# Patient Record
Sex: MALE | Race: BLACK OR AFRICAN AMERICAN | NOT HISPANIC OR LATINO | Employment: UNEMPLOYED | URBAN - METROPOLITAN AREA
[De-identification: names, ages, dates, MRNs, and addresses within clinical notes are randomized per-mention and may not be internally consistent; named-entity substitution may affect disease eponyms.]

---

## 2017-07-27 ENCOUNTER — TELEPHONE (OUTPATIENT)
Dept: SPEECH THERAPY | Facility: HOSPITAL | Age: 22
End: 2017-07-27

## 2019-12-23 ENCOUNTER — HOSPITAL ENCOUNTER (INPATIENT)
Facility: HOSPITAL | Age: 24
LOS: 4 days | Discharge: HOME | DRG: 101 | End: 2019-12-27
Attending: EMERGENCY MEDICINE | Admitting: STUDENT IN AN ORGANIZED HEALTH CARE EDUCATION/TRAINING PROGRAM
Payer: MEDICAID

## 2019-12-23 DIAGNOSIS — G40.901 STATUS EPILEPTICUS: Primary | ICD-10-CM

## 2019-12-23 DIAGNOSIS — Z01.818 ENCOUNTER FOR INTUBATION: ICD-10-CM

## 2019-12-23 DIAGNOSIS — J10.1 INFLUENZA B: ICD-10-CM

## 2019-12-23 DIAGNOSIS — R56.9 SEIZURE: ICD-10-CM

## 2019-12-23 LAB
ALBUMIN SERPL BCP-MCNC: 4 G/DL (ref 3.5–5.2)
ALLENS TEST: ABNORMAL
ALP SERPL-CCNC: 78 U/L (ref 55–135)
ALT SERPL W/O P-5'-P-CCNC: 18 U/L (ref 10–44)
AMPHET+METHAMPHET UR QL: NEGATIVE
AMPHET+METHAMPHET UR QL: NEGATIVE
ANION GAP SERPL CALC-SCNC: 8 MMOL/L (ref 8–16)
AST SERPL-CCNC: 19 U/L (ref 10–40)
BACTERIA #/AREA URNS HPF: NEGATIVE /HPF
BARBITURATES UR QL SCN>200 NG/ML: NEGATIVE
BARBITURATES UR QL SCN>200 NG/ML: NEGATIVE
BASOPHILS # BLD AUTO: 0.02 K/UL (ref 0–0.2)
BASOPHILS # BLD AUTO: 0.04 K/UL (ref 0–0.2)
BASOPHILS NFR BLD: 0.2 % (ref 0–1.9)
BASOPHILS NFR BLD: 0.3 % (ref 0–1.9)
BENZODIAZ UR QL SCN>200 NG/ML: NORMAL
BENZODIAZ UR QL SCN>200 NG/ML: NORMAL
BILIRUB SERPL-MCNC: 0.6 MG/DL (ref 0.1–1)
BILIRUB UR QL STRIP: NEGATIVE
BUN SERPL-MCNC: 10 MG/DL (ref 6–20)
BZE UR QL SCN: NEGATIVE
BZE UR QL SCN: NEGATIVE
CALCIUM SERPL-MCNC: 7.8 MG/DL (ref 8.7–10.5)
CANNABINOIDS UR QL SCN: NEGATIVE
CANNABINOIDS UR QL SCN: NEGATIVE
CHLORIDE SERPL-SCNC: 113 MMOL/L (ref 95–110)
CLARITY UR: CLEAR
CO2 SERPL-SCNC: 20 MMOL/L (ref 23–29)
COLOR UR: YELLOW
CREAT SERPL-MCNC: 1 MG/DL (ref 0.5–1.4)
CREAT UR-MCNC: 176 MG/DL (ref 23–375)
CREAT UR-MCNC: 176 MG/DL (ref 23–375)
DELSYS: ABNORMAL
DIFFERENTIAL METHOD: ABNORMAL
DIFFERENTIAL METHOD: ABNORMAL
EOSINOPHIL # BLD AUTO: 0 K/UL (ref 0–0.5)
EOSINOPHIL # BLD AUTO: 0 K/UL (ref 0–0.5)
EOSINOPHIL NFR BLD: 0.1 % (ref 0–8)
EOSINOPHIL NFR BLD: 0.2 % (ref 0–8)
ERYTHROCYTE [DISTWIDTH] IN BLOOD BY AUTOMATED COUNT: 11.3 % (ref 11.5–14.5)
ERYTHROCYTE [DISTWIDTH] IN BLOOD BY AUTOMATED COUNT: 11.4 % (ref 11.5–14.5)
ERYTHROCYTE [SEDIMENTATION RATE] IN BLOOD BY WESTERGREN METHOD: 18 MM/H
EST. GFR  (AFRICAN AMERICAN): >60 ML/MIN/1.73 M^2
EST. GFR  (NON AFRICAN AMERICAN): >60 ML/MIN/1.73 M^2
FIO2: 40
GLUCOSE SERPL-MCNC: 96 MG/DL (ref 70–110)
GLUCOSE SERPL-MCNC: 99 MG/DL (ref 70–110)
GLUCOSE UR QL STRIP: NEGATIVE
HCO3 UR-SCNC: 17.6 MMOL/L (ref 24–28)
HCT VFR BLD AUTO: 42.6 % (ref 40–54)
HCT VFR BLD AUTO: 47.2 % (ref 40–54)
HGB BLD-MCNC: 14.8 G/DL (ref 14–18)
HGB BLD-MCNC: 15.8 G/DL (ref 14–18)
HGB UR QL STRIP: ABNORMAL
HYALINE CASTS #/AREA URNS LPF: 16 /LPF
IMM GRANULOCYTES # BLD AUTO: 0.02 K/UL (ref 0–0.04)
IMM GRANULOCYTES # BLD AUTO: 0.03 K/UL (ref 0–0.04)
IMM GRANULOCYTES NFR BLD AUTO: 0.1 % (ref 0–0.5)
IMM GRANULOCYTES NFR BLD AUTO: 0.3 % (ref 0–0.5)
INFLUENZA A, MOLECULAR: NEGATIVE
INFLUENZA B, MOLECULAR: POSITIVE
KETONES UR QL STRIP: NEGATIVE
LACTATE SERPL-SCNC: 1.5 MMOL/L (ref 0.5–1.9)
LACTATE SERPL-SCNC: 2.2 MMOL/L (ref 0.5–1.9)
LACTATE SERPL-SCNC: 2.7 MMOL/L (ref 0.5–1.9)
LEUKOCYTE ESTERASE UR QL STRIP: NEGATIVE
LYMPHOCYTES # BLD AUTO: 0.4 K/UL (ref 1–4.8)
LYMPHOCYTES # BLD AUTO: 1.3 K/UL (ref 1–4.8)
LYMPHOCYTES NFR BLD: 3.6 % (ref 18–48)
LYMPHOCYTES NFR BLD: 9.7 % (ref 18–48)
MCH RBC QN AUTO: 32.1 PG (ref 27–31)
MCH RBC QN AUTO: 32.7 PG (ref 27–31)
MCHC RBC AUTO-ENTMCNC: 33.5 G/DL (ref 32–36)
MCHC RBC AUTO-ENTMCNC: 34.7 G/DL (ref 32–36)
MCV RBC AUTO: 94 FL (ref 82–98)
MCV RBC AUTO: 96 FL (ref 82–98)
MICROSCOPIC COMMENT: ABNORMAL
MODE: ABNORMAL
MONOCYTES # BLD AUTO: 0.6 K/UL (ref 0.3–1)
MONOCYTES # BLD AUTO: 1.4 K/UL (ref 0.3–1)
MONOCYTES NFR BLD: 10.3 % (ref 4–15)
MONOCYTES NFR BLD: 6.1 % (ref 4–15)
NEUTROPHILS # BLD AUTO: 10.7 K/UL (ref 1.8–7.7)
NEUTROPHILS # BLD AUTO: 8.8 K/UL (ref 1.8–7.7)
NEUTROPHILS NFR BLD: 79.5 % (ref 38–73)
NEUTROPHILS NFR BLD: 89.6 % (ref 38–73)
NITRITE UR QL STRIP: NEGATIVE
NRBC BLD-RTO: 0 /100 WBC
NRBC BLD-RTO: 0 /100 WBC
OPIATES UR QL SCN: NEGATIVE
OPIATES UR QL SCN: NEGATIVE
PCO2 BLDA: 30.2 MMHG (ref 35–45)
PCP UR QL SCN>25 NG/ML: NEGATIVE
PCP UR QL SCN>25 NG/ML: NEGATIVE
PEEP: 5
PH SMN: 7.37 [PH] (ref 7.35–7.45)
PH UR STRIP: 6 [PH] (ref 5–8)
PLATELET # BLD AUTO: 101 K/UL (ref 150–350)
PLATELET # BLD AUTO: 105 K/UL (ref 150–350)
PMV BLD AUTO: 10.8 FL (ref 9.2–12.9)
PMV BLD AUTO: 10.9 FL (ref 9.2–12.9)
PO2 BLDA: 218 MMHG (ref 80–100)
POC BE: -8 MMOL/L
POC SATURATED O2: 100 % (ref 95–100)
POC TCO2: 19 MMOL/L (ref 23–27)
POTASSIUM SERPL-SCNC: 3.8 MMOL/L (ref 3.5–5.1)
PROCALCITONIN SERPL IA-MCNC: 0.06 NG/ML (ref 0–0.5)
PROT SERPL-MCNC: 6.2 G/DL (ref 6–8.4)
PROT UR QL STRIP: ABNORMAL
RBC # BLD AUTO: 4.53 M/UL (ref 4.6–6.2)
RBC # BLD AUTO: 4.92 M/UL (ref 4.6–6.2)
RBC #/AREA URNS HPF: 3 /HPF (ref 0–4)
SAMPLE: ABNORMAL
SITE: ABNORMAL
SODIUM SERPL-SCNC: 141 MMOL/L (ref 136–145)
SP GR UR STRIP: 1.02 (ref 1–1.03)
SP02: 100
SPECIMEN SOURCE: ABNORMAL
SQUAMOUS #/AREA URNS HPF: 3 /HPF
TOXICOLOGY INFORMATION: NORMAL
TOXICOLOGY INFORMATION: NORMAL
TSH SERPL DL<=0.005 MIU/L-ACNC: 0.55 UIU/ML (ref 0.34–5.6)
URN SPEC COLLECT METH UR: ABNORMAL
UROBILINOGEN UR STRIP-ACNC: NEGATIVE EU/DL
VT: 400
WBC # BLD AUTO: 13.44 K/UL (ref 3.9–12.7)
WBC # BLD AUTO: 9.83 K/UL (ref 3.9–12.7)
WBC #/AREA URNS HPF: 1 /HPF (ref 0–5)

## 2019-12-23 PROCEDURE — 84443 ASSAY THYROID STIM HORMONE: CPT

## 2019-12-23 PROCEDURE — 31500 INSERT EMERGENCY AIRWAY: CPT

## 2019-12-23 PROCEDURE — 85025 COMPLETE CBC W/AUTO DIFF WBC: CPT | Mod: 91

## 2019-12-23 PROCEDURE — 80307 DRUG TEST PRSMV CHEM ANLYZR: CPT

## 2019-12-23 PROCEDURE — 63600175 PHARM REV CODE 636 W HCPCS: Performed by: STUDENT IN AN ORGANIZED HEALTH CARE EDUCATION/TRAINING PROGRAM

## 2019-12-23 PROCEDURE — 87040 BLOOD CULTURE FOR BACTERIA: CPT | Mod: 59

## 2019-12-23 PROCEDURE — 99900026 HC AIRWAY MAINTENANCE (STAT)

## 2019-12-23 PROCEDURE — 87070 CULTURE OTHR SPECIMN AEROBIC: CPT

## 2019-12-23 PROCEDURE — 96376 TX/PRO/DX INJ SAME DRUG ADON: CPT

## 2019-12-23 PROCEDURE — 99291 CRITICAL CARE FIRST HOUR: CPT

## 2019-12-23 PROCEDURE — 99900035 HC TECH TIME PER 15 MIN (STAT)

## 2019-12-23 PROCEDURE — 36600 WITHDRAWAL OF ARTERIAL BLOOD: CPT

## 2019-12-23 PROCEDURE — 81001 URINALYSIS AUTO W/SCOPE: CPT

## 2019-12-23 PROCEDURE — 20000000 HC ICU ROOM

## 2019-12-23 PROCEDURE — 83605 ASSAY OF LACTIC ACID: CPT

## 2019-12-23 PROCEDURE — 63600175 PHARM REV CODE 636 W HCPCS: Performed by: EMERGENCY MEDICINE

## 2019-12-23 PROCEDURE — 87205 SMEAR GRAM STAIN: CPT

## 2019-12-23 PROCEDURE — 80053 COMPREHEN METABOLIC PANEL: CPT

## 2019-12-23 PROCEDURE — 25000003 PHARM REV CODE 250: Performed by: STUDENT IN AN ORGANIZED HEALTH CARE EDUCATION/TRAINING PROGRAM

## 2019-12-23 PROCEDURE — 95819 EEG AWAKE AND ASLEEP: CPT

## 2019-12-23 PROCEDURE — 96366 THER/PROPH/DIAG IV INF ADDON: CPT

## 2019-12-23 PROCEDURE — 94761 N-INVAS EAR/PLS OXIMETRY MLT: CPT

## 2019-12-23 PROCEDURE — 83605 ASSAY OF LACTIC ACID: CPT | Mod: 91

## 2019-12-23 PROCEDURE — 27000221 HC OXYGEN, UP TO 24 HOURS

## 2019-12-23 PROCEDURE — 96375 TX/PRO/DX INJ NEW DRUG ADDON: CPT

## 2019-12-23 PROCEDURE — 82962 GLUCOSE BLOOD TEST: CPT

## 2019-12-23 PROCEDURE — 51702 INSERT TEMP BLADDER CATH: CPT

## 2019-12-23 PROCEDURE — 96365 THER/PROPH/DIAG IV INF INIT: CPT | Mod: 59

## 2019-12-23 PROCEDURE — 12000002 HC ACUTE/MED SURGE SEMI-PRIVATE ROOM

## 2019-12-23 PROCEDURE — 82803 BLOOD GASES ANY COMBINATION: CPT

## 2019-12-23 PROCEDURE — 87086 URINE CULTURE/COLONY COUNT: CPT

## 2019-12-23 PROCEDURE — 94002 VENT MGMT INPAT INIT DAY: CPT

## 2019-12-23 PROCEDURE — 36415 COLL VENOUS BLD VENIPUNCTURE: CPT

## 2019-12-23 PROCEDURE — 84145 PROCALCITONIN (PCT): CPT

## 2019-12-23 PROCEDURE — 87502 INFLUENZA DNA AMP PROBE: CPT

## 2019-12-23 PROCEDURE — 93005 ELECTROCARDIOGRAM TRACING: CPT

## 2019-12-23 PROCEDURE — 25000003 PHARM REV CODE 250: Performed by: EMERGENCY MEDICINE

## 2019-12-23 RX ORDER — TOPIRAMATE 100 MG/1
200 CAPSULE, EXTENDED RELEASE ORAL DAILY
Status: DISCONTINUED | OUTPATIENT
Start: 2019-12-24 | End: 2019-12-26

## 2019-12-23 RX ORDER — ACETAMINOPHEN 650 MG/1
650 SUPPOSITORY RECTAL
Status: COMPLETED | OUTPATIENT
Start: 2019-12-23 | End: 2019-12-23

## 2019-12-23 RX ORDER — LORAZEPAM 2 MG/ML
2 INJECTION INTRAMUSCULAR
Status: COMPLETED | OUTPATIENT
Start: 2019-12-23 | End: 2019-12-23

## 2019-12-23 RX ORDER — ACETAMINOPHEN 325 MG/1
650 TABLET ORAL EVERY 6 HOURS PRN
Status: DISCONTINUED | OUTPATIENT
Start: 2019-12-23 | End: 2019-12-26

## 2019-12-23 RX ORDER — SODIUM CHLORIDE, SODIUM LACTATE, POTASSIUM CHLORIDE, CALCIUM CHLORIDE 600; 310; 30; 20 MG/100ML; MG/100ML; MG/100ML; MG/100ML
INJECTION, SOLUTION INTRAVENOUS CONTINUOUS
Status: DISCONTINUED | OUTPATIENT
Start: 2019-12-23 | End: 2019-12-24

## 2019-12-23 RX ORDER — CLORAZEPATE DIPOTASSIUM 7.5 MG/1
7.5 TABLET ORAL 2 TIMES DAILY
Status: DISCONTINUED | OUTPATIENT
Start: 2019-12-23 | End: 2019-12-26

## 2019-12-23 RX ORDER — VANCOMYCIN HCL IN 5 % DEXTROSE 1G/250ML
1000 PLASTIC BAG, INJECTION (ML) INTRAVENOUS ONCE
Status: DISCONTINUED | OUTPATIENT
Start: 2019-12-23 | End: 2019-12-23

## 2019-12-23 RX ORDER — PANTOPRAZOLE SODIUM 40 MG/10ML
40 INJECTION, POWDER, LYOPHILIZED, FOR SOLUTION INTRAVENOUS DAILY
Status: DISCONTINUED | OUTPATIENT
Start: 2019-12-24 | End: 2019-12-26

## 2019-12-23 RX ORDER — SUCCINYLCHOLINE CHLORIDE 20 MG/ML
100 INJECTION INTRAMUSCULAR; INTRAVENOUS
Status: COMPLETED | OUTPATIENT
Start: 2019-12-23 | End: 2019-12-23

## 2019-12-23 RX ORDER — LORAZEPAM 2 MG/ML
2 INJECTION INTRAMUSCULAR EVERY 4 HOURS PRN
Status: DISCONTINUED | OUTPATIENT
Start: 2019-12-23 | End: 2019-12-27 | Stop reason: HOSPADM

## 2019-12-23 RX ORDER — LORAZEPAM 2 MG/ML
2 INJECTION INTRAMUSCULAR
Status: DISCONTINUED | OUTPATIENT
Start: 2019-12-23 | End: 2019-12-27 | Stop reason: HOSPADM

## 2019-12-23 RX ORDER — MAGNESIUM SULFATE HEPTAHYDRATE 40 MG/ML
2 INJECTION, SOLUTION INTRAVENOUS
Status: DISCONTINUED | OUTPATIENT
Start: 2019-12-23 | End: 2019-12-27 | Stop reason: HOSPADM

## 2019-12-23 RX ORDER — DEXMEDETOMIDINE HYDROCHLORIDE 4 UG/ML
0.2 INJECTION, SOLUTION INTRAVENOUS CONTINUOUS
Status: DISCONTINUED | OUTPATIENT
Start: 2019-12-23 | End: 2019-12-24

## 2019-12-23 RX ORDER — POTASSIUM CHLORIDE 7.45 MG/ML
60 INJECTION INTRAVENOUS
Status: DISCONTINUED | OUTPATIENT
Start: 2019-12-23 | End: 2019-12-27 | Stop reason: HOSPADM

## 2019-12-23 RX ORDER — TOPIRAMATE 100 MG/1
300 CAPSULE, EXTENDED RELEASE ORAL NIGHTLY
Status: DISCONTINUED | OUTPATIENT
Start: 2019-12-23 | End: 2019-12-26

## 2019-12-23 RX ORDER — PROPOFOL 10 MG/ML
20 INJECTION, EMULSION INTRAVENOUS CONTINUOUS
Status: DISCONTINUED | OUTPATIENT
Start: 2019-12-23 | End: 2019-12-26

## 2019-12-23 RX ORDER — RISPERIDONE 0.25 MG/1
1 TABLET ORAL DAILY
Status: DISCONTINUED | OUTPATIENT
Start: 2019-12-24 | End: 2019-12-26

## 2019-12-23 RX ORDER — MAGNESIUM SULFATE HEPTAHYDRATE 40 MG/ML
4 INJECTION, SOLUTION INTRAVENOUS
Status: DISCONTINUED | OUTPATIENT
Start: 2019-12-23 | End: 2019-12-27 | Stop reason: HOSPADM

## 2019-12-23 RX ORDER — LORAZEPAM 2 MG/ML
4 INJECTION INTRAMUSCULAR ONCE
Status: DISCONTINUED | OUTPATIENT
Start: 2019-12-23 | End: 2019-12-27 | Stop reason: HOSPADM

## 2019-12-23 RX ORDER — TOPIRAMATE 100 MG/1
300 CAPSULE, EXTENDED RELEASE ORAL NIGHTLY
COMMUNITY

## 2019-12-23 RX ORDER — PROPOFOL 10 MG/ML
150 INJECTION, EMULSION INTRAVENOUS
Status: COMPLETED | OUTPATIENT
Start: 2019-12-23 | End: 2019-12-23

## 2019-12-23 RX ORDER — POTASSIUM CHLORIDE 7.45 MG/ML
40 INJECTION INTRAVENOUS
Status: DISCONTINUED | OUTPATIENT
Start: 2019-12-23 | End: 2019-12-27 | Stop reason: HOSPADM

## 2019-12-23 RX ORDER — ACETAMINOPHEN 650 MG/1
650 SUPPOSITORY RECTAL ONCE
Status: COMPLETED | OUTPATIENT
Start: 2019-12-23 | End: 2019-12-23

## 2019-12-23 RX ADMIN — SODIUM CHLORIDE, SODIUM LACTATE, POTASSIUM CHLORIDE, AND CALCIUM CHLORIDE: .6; .31; .03; .02 INJECTION, SOLUTION INTRAVENOUS at 09:12

## 2019-12-23 RX ADMIN — PROPOFOL 50 MCG/KG/MIN: 10 INJECTION, EMULSION INTRAVENOUS at 09:12

## 2019-12-23 RX ADMIN — LORAZEPAM 2 MG: 2 INJECTION INTRAMUSCULAR; INTRAVENOUS at 09:12

## 2019-12-23 RX ADMIN — LORAZEPAM 2 MG: 2 INJECTION INTRAMUSCULAR; INTRAVENOUS at 06:12

## 2019-12-23 RX ADMIN — LEVETIRACETAM 2000 MG: 100 INJECTION, SOLUTION, CONCENTRATE INTRAVENOUS at 08:12

## 2019-12-23 RX ADMIN — CEFTRIAXONE 2 G: 2 INJECTION, SOLUTION INTRAVENOUS at 07:12

## 2019-12-23 RX ADMIN — SUCCINYLCHOLINE CHLORIDE 100 MG: 20 INJECTION, SOLUTION INTRAMUSCULAR; INTRAVENOUS at 06:12

## 2019-12-23 RX ADMIN — PROPOFOL 150 MG: 10 INJECTION, EMULSION INTRAVENOUS at 06:12

## 2019-12-23 RX ADMIN — ACETAMINOPHEN 650 MG: 650 SUPPOSITORY RECTAL at 11:12

## 2019-12-23 RX ADMIN — SODIUM CHLORIDE, SODIUM LACTATE, POTASSIUM CHLORIDE, AND CALCIUM CHLORIDE 1000 ML: .6; .31; .03; .02 INJECTION, SOLUTION INTRAVENOUS at 07:12

## 2019-12-23 RX ADMIN — PROPOFOL 20 MCG/KG/MIN: 10 INJECTION, EMULSION INTRAVENOUS at 06:12

## 2019-12-23 RX ADMIN — ACETAMINOPHEN 650 MG: 650 SUPPOSITORY RECTAL at 06:12

## 2019-12-24 PROBLEM — E87.6 HYPOKALEMIA: Status: ACTIVE | Noted: 2019-12-24

## 2019-12-24 PROBLEM — E87.20 LACTIC ACIDOSIS: Status: ACTIVE | Noted: 2019-12-24

## 2019-12-24 PROBLEM — R50.9 FEVER: Status: ACTIVE | Noted: 2019-12-24

## 2019-12-24 LAB
25(OH)D3+25(OH)D2 SERPL-MCNC: 24 NG/ML (ref 30–96)
ALBUMIN SERPL BCP-MCNC: 3.5 G/DL (ref 3.5–5.2)
ALLENS TEST: ABNORMAL
ALP SERPL-CCNC: 68 U/L (ref 55–135)
ALT SERPL W/O P-5'-P-CCNC: 17 U/L (ref 10–44)
AMMONIA PLAS-SCNC: 67 UMOL/L (ref 10–50)
ANION GAP SERPL CALC-SCNC: 10 MMOL/L (ref 8–16)
AST SERPL-CCNC: 20 U/L (ref 10–40)
BASOPHILS # BLD AUTO: 0.02 K/UL (ref 0–0.2)
BASOPHILS NFR BLD: 0.2 % (ref 0–1.9)
BILIRUB SERPL-MCNC: 0.7 MG/DL (ref 0.1–1)
BUN SERPL-MCNC: 9 MG/DL (ref 6–20)
CA-I BLDV-SCNC: 1.15 MMOL/L (ref 1.06–1.42)
CALCIUM SERPL-MCNC: 8.4 MG/DL (ref 8.7–10.5)
CHLORIDE SERPL-SCNC: 112 MMOL/L (ref 95–110)
CO2 SERPL-SCNC: 18 MMOL/L (ref 23–29)
CREAT SERPL-MCNC: 0.9 MG/DL (ref 0.5–1.4)
DELSYS: ABNORMAL
DIFFERENTIAL METHOD: ABNORMAL
EOSINOPHIL # BLD AUTO: 0 K/UL (ref 0–0.5)
EOSINOPHIL NFR BLD: 0 % (ref 0–8)
ERYTHROCYTE [DISTWIDTH] IN BLOOD BY AUTOMATED COUNT: 11.3 % (ref 11.5–14.5)
ERYTHROCYTE [SEDIMENTATION RATE] IN BLOOD BY WESTERGREN METHOD: 18 MM/H
EST. GFR  (AFRICAN AMERICAN): >60 ML/MIN/1.73 M^2
EST. GFR  (NON AFRICAN AMERICAN): >60 ML/MIN/1.73 M^2
FIO2: 25
GLUCOSE SERPL-MCNC: 118 MG/DL (ref 70–110)
GLUCOSE SERPL-MCNC: 121 MG/DL (ref 70–110)
GLUCOSE SERPL-MCNC: 84 MG/DL (ref 70–110)
HCO3 UR-SCNC: 18.7 MMOL/L (ref 24–28)
HCT VFR BLD AUTO: 40.7 % (ref 40–54)
HCT VFR BLD CALC: 41 %PCV (ref 36–54)
HGB BLD-MCNC: 14.2 G/DL (ref 14–18)
IMM GRANULOCYTES # BLD AUTO: 0.04 K/UL (ref 0–0.04)
IMM GRANULOCYTES NFR BLD AUTO: 0.4 % (ref 0–0.5)
LACTATE SERPL-SCNC: 1.6 MMOL/L (ref 0.5–1.9)
LACTATE SERPL-SCNC: 2.8 MMOL/L (ref 0.5–1.9)
LACTATE SERPL-SCNC: 3.8 MMOL/L (ref 0.5–1.9)
LACTATE SERPL-SCNC: 4 MMOL/L (ref 0.5–1.9)
LYMPHOCYTES # BLD AUTO: 0.7 K/UL (ref 1–4.8)
LYMPHOCYTES NFR BLD: 6.3 % (ref 18–48)
MAGNESIUM SERPL-MCNC: 1.4 MG/DL (ref 1.6–2.6)
MCH RBC QN AUTO: 32 PG (ref 27–31)
MCHC RBC AUTO-ENTMCNC: 34.9 G/DL (ref 32–36)
MCV RBC AUTO: 92 FL (ref 82–98)
MIN VOL: 6.5
MODE: ABNORMAL
MONOCYTES # BLD AUTO: 1.3 K/UL (ref 0.3–1)
MONOCYTES NFR BLD: 11 % (ref 4–15)
NEUTROPHILS # BLD AUTO: 9.3 K/UL (ref 1.8–7.7)
NEUTROPHILS NFR BLD: 82.1 % (ref 38–73)
NRBC BLD-RTO: 0 /100 WBC
PCO2 BLDA: 29 MMHG (ref 35–45)
PEEP: 5
PH SMN: 7.42 [PH] (ref 7.35–7.45)
PHOSPHATE SERPL-MCNC: 3.1 MG/DL (ref 2.7–4.5)
PIP: 20
PLATELET # BLD AUTO: 103 K/UL (ref 150–350)
PMV BLD AUTO: 10.8 FL (ref 9.2–12.9)
PO2 BLDA: 113 MMHG (ref 80–100)
POC BE: -6 MMOL/L
POC IONIZED CALCIUM: 1.18 MMOL/L (ref 1.06–1.42)
POC PCO2 TEMP: 32.6 MMHG
POC PH TEMP: 7.38
POC PO2 TEMP: 131 MMHG
POC SATURATED O2: 99 % (ref 95–100)
POC TCO2: 20 MMOL/L (ref 23–27)
POC TEMPERATURE: ABNORMAL
POTASSIUM BLD-SCNC: 3.5 MMOL/L (ref 3.5–5.1)
POTASSIUM SERPL-SCNC: 3 MMOL/L (ref 3.5–5.1)
PROT SERPL-MCNC: 6.1 G/DL (ref 6–8.4)
RBC # BLD AUTO: 4.44 M/UL (ref 4.6–6.2)
SAMPLE: ABNORMAL
SITE: ABNORMAL
SODIUM BLD-SCNC: 139 MMOL/L (ref 136–145)
SODIUM SERPL-SCNC: 140 MMOL/L (ref 136–145)
SP02: 100
VIT B12 SERPL-MCNC: 312 PG/ML (ref 210–950)
VT: 400
WBC # BLD AUTO: 11.33 K/UL (ref 3.9–12.7)

## 2019-12-24 PROCEDURE — 94003 VENT MGMT INPAT SUBQ DAY: CPT

## 2019-12-24 PROCEDURE — 95813 EEG EXTND MNTR 61-119 MIN: CPT

## 2019-12-24 PROCEDURE — 20000000 HC ICU ROOM

## 2019-12-24 PROCEDURE — 99291 PR CRITICAL CARE, E/M 30-74 MINUTES: ICD-10-PCS | Mod: ,,, | Performed by: INTERNAL MEDICINE

## 2019-12-24 PROCEDURE — 25000003 PHARM REV CODE 250: Performed by: STUDENT IN AN ORGANIZED HEALTH CARE EDUCATION/TRAINING PROGRAM

## 2019-12-24 PROCEDURE — 36415 COLL VENOUS BLD VENIPUNCTURE: CPT

## 2019-12-24 PROCEDURE — 80177 DRUG SCRN QUAN LEVETIRACETAM: CPT

## 2019-12-24 PROCEDURE — 99900035 HC TECH TIME PER 15 MIN (STAT)

## 2019-12-24 PROCEDURE — 82330 ASSAY OF CALCIUM: CPT

## 2019-12-24 PROCEDURE — 83735 ASSAY OF MAGNESIUM: CPT

## 2019-12-24 PROCEDURE — 25000003 PHARM REV CODE 250: Performed by: INTERNAL MEDICINE

## 2019-12-24 PROCEDURE — 99291 CRITICAL CARE FIRST HOUR: CPT | Mod: ,,, | Performed by: INTERNAL MEDICINE

## 2019-12-24 PROCEDURE — 63600175 PHARM REV CODE 636 W HCPCS: Performed by: STUDENT IN AN ORGANIZED HEALTH CARE EDUCATION/TRAINING PROGRAM

## 2019-12-24 PROCEDURE — 85025 COMPLETE CBC W/AUTO DIFF WBC: CPT

## 2019-12-24 PROCEDURE — 80183 DRUG SCRN QUANT OXCARBAZEPIN: CPT

## 2019-12-24 PROCEDURE — 84100 ASSAY OF PHOSPHORUS: CPT

## 2019-12-24 PROCEDURE — 83605 ASSAY OF LACTIC ACID: CPT | Mod: 91

## 2019-12-24 PROCEDURE — 99900026 HC AIRWAY MAINTENANCE (STAT)

## 2019-12-24 PROCEDURE — 82306 VITAMIN D 25 HYDROXY: CPT

## 2019-12-24 PROCEDURE — 80201 ASSAY OF TOPIRAMATE: CPT

## 2019-12-24 PROCEDURE — 84132 ASSAY OF SERUM POTASSIUM: CPT

## 2019-12-24 PROCEDURE — 85014 HEMATOCRIT: CPT

## 2019-12-24 PROCEDURE — 94761 N-INVAS EAR/PLS OXIMETRY MLT: CPT

## 2019-12-24 PROCEDURE — 82140 ASSAY OF AMMONIA: CPT

## 2019-12-24 PROCEDURE — 83605 ASSAY OF LACTIC ACID: CPT

## 2019-12-24 PROCEDURE — 82607 VITAMIN B-12: CPT

## 2019-12-24 PROCEDURE — 36600 WITHDRAWAL OF ARTERIAL BLOOD: CPT

## 2019-12-24 PROCEDURE — 82803 BLOOD GASES ANY COMBINATION: CPT

## 2019-12-24 PROCEDURE — 83036 HEMOGLOBIN GLYCOSYLATED A1C: CPT

## 2019-12-24 PROCEDURE — 12000002 HC ACUTE/MED SURGE SEMI-PRIVATE ROOM

## 2019-12-24 PROCEDURE — 84295 ASSAY OF SERUM SODIUM: CPT

## 2019-12-24 PROCEDURE — 80053 COMPREHEN METABOLIC PANEL: CPT

## 2019-12-24 PROCEDURE — 86592 SYPHILIS TEST NON-TREP QUAL: CPT

## 2019-12-24 PROCEDURE — C9113 INJ PANTOPRAZOLE SODIUM, VIA: HCPCS | Performed by: STUDENT IN AN ORGANIZED HEALTH CARE EDUCATION/TRAINING PROGRAM

## 2019-12-24 PROCEDURE — 25000003 PHARM REV CODE 250

## 2019-12-24 PROCEDURE — 84425 ASSAY OF VITAMIN B-1: CPT

## 2019-12-24 RX ORDER — CHLORHEXIDINE GLUCONATE ORAL RINSE 1.2 MG/ML
15 SOLUTION DENTAL 2 TIMES DAILY
Status: DISCONTINUED | OUTPATIENT
Start: 2019-12-24 | End: 2019-12-27 | Stop reason: HOSPADM

## 2019-12-24 RX ORDER — ACETAMINOPHEN 650 MG/1
650 SUPPOSITORY RECTAL EVERY 6 HOURS PRN
Status: DISCONTINUED | OUTPATIENT
Start: 2019-12-24 | End: 2019-12-25

## 2019-12-24 RX ORDER — ACETAMINOPHEN 160 MG/5ML
650 SOLUTION ORAL EVERY 6 HOURS
Status: DISCONTINUED | OUTPATIENT
Start: 2019-12-24 | End: 2019-12-24

## 2019-12-24 RX ORDER — MUPIROCIN 20 MG/G
OINTMENT TOPICAL 2 TIMES DAILY
Status: DISCONTINUED | OUTPATIENT
Start: 2019-12-24 | End: 2019-12-26

## 2019-12-24 RX ADMIN — LORAZEPAM 2 MG: 2 INJECTION INTRAMUSCULAR; INTRAVENOUS at 02:12

## 2019-12-24 RX ADMIN — CHLORHEXIDINE GLUCONATE 15 ML: 1.2 RINSE ORAL at 09:12

## 2019-12-24 RX ADMIN — CEFTRIAXONE 2 G: 2 INJECTION, SOLUTION INTRAVENOUS at 06:12

## 2019-12-24 RX ADMIN — TOPIRAMATE 200 MG: 100 CAPSULE, EXTENDED RELEASE ORAL at 10:12

## 2019-12-24 RX ADMIN — RISPERIDONE 1 MG: 1 TABLET ORAL at 06:12

## 2019-12-24 RX ADMIN — POTASSIUM CHLORIDE 60 MEQ: 7.46 INJECTION, SOLUTION INTRAVENOUS at 03:12

## 2019-12-24 RX ADMIN — MUPIROCIN: 20 OINTMENT TOPICAL at 10:12

## 2019-12-24 RX ADMIN — TOPIRAMATE 300 MG: 100 CAPSULE, EXTENDED RELEASE ORAL at 08:12

## 2019-12-24 RX ADMIN — PANTOPRAZOLE SODIUM 40 MG: 40 INJECTION, POWDER, FOR SOLUTION INTRAVENOUS at 09:12

## 2019-12-24 RX ADMIN — PROPOFOL 50 MCG/KG/MIN: 10 INJECTION, EMULSION INTRAVENOUS at 05:12

## 2019-12-24 RX ADMIN — CLORAZEPATE DIPOTASSIUM 7.5 MG: 7.5 TABLET ORAL at 08:12

## 2019-12-24 RX ADMIN — BALOXAVIR MARBOXIL 40 MG: 20 TABLET, FILM COATED ORAL at 10:12

## 2019-12-24 RX ADMIN — LORAZEPAM 2 MG: 2 INJECTION INTRAMUSCULAR; INTRAVENOUS at 05:12

## 2019-12-24 RX ADMIN — CEFTRIAXONE 2 G: 2 INJECTION, SOLUTION INTRAVENOUS at 07:12

## 2019-12-24 RX ADMIN — ACETAMINOPHEN 650 MG: 325 TABLET ORAL at 03:12

## 2019-12-24 RX ADMIN — MAGNESIUM SULFATE 4 G: 2 INJECTION INTRAVENOUS at 03:12

## 2019-12-24 RX ADMIN — CLORAZEPATE DIPOTASSIUM 7.5 MG: 7.5 TABLET ORAL at 09:12

## 2019-12-24 RX ADMIN — ACETAMINOPHEN 650 MG: 650 SUPPOSITORY RECTAL at 01:12

## 2019-12-24 RX ADMIN — ACETAMINOPHEN 650 MG: 325 TABLET ORAL at 08:12

## 2019-12-24 NOTE — SUBJECTIVE & OBJECTIVE
Interval History: status    Review of Systems   Unable to perform ROS: Intubated     Objective:     Vital Signs (Most Recent):  Temp: 100 °F (37.8 °C) (12/24/19 1001)  Pulse: 68 (12/24/19 1001)  Resp: 16 (12/24/19 1001)  BP: (!) 115/58 (12/24/19 1001)  SpO2: 100 % (12/24/19 1001) Vital Signs (24h Range):  Temp:  [98 °F (36.7 °C)-103.4 °F (39.7 °C)] 100 °F (37.8 °C)  Pulse:  [] 68  Resp:  [16-43] 16  SpO2:  [97 %-100 %] 100 %  BP: ()/() 115/58     Weight: 48.9 kg (107 lb 12.9 oz)  Body mass index is 17.94 kg/m².    Intake/Output Summary (Last 24 hours) at 12/24/2019 1107  Last data filed at 12/24/2019 0600  Gross per 24 hour   Intake 1738.7 ml   Output 1200 ml   Net 538.7 ml      Physical Exam   Constitutional: He appears well-developed and well-nourished.   HENT:   Head: Normocephalic and atraumatic.   Right Ear: External ear normal.   Left Ear: External ear normal.   Nose: Nose normal.   Mouth/Throat: Oropharynx is clear and moist.   Eyes:   Unable to fully asssess   Neck: Normal range of motion. Neck supple.   Cardiovascular: Normal rate, regular rhythm, normal heart sounds and intact distal pulses.   Pulmonary/Chest: Effort normal and breath sounds normal.   Abdominal: Soft. Bowel sounds are normal.   Musculoskeletal:   Unable to fully asssess   Neurological:   Unable to fully asssess   Skin: Skin is warm and dry. Capillary refill takes less than 2 seconds.   Psychiatric: Judgment normal.   Unable to fully asssess   Nursing note and vitals reviewed.      Significant Labs:   BMP:   Recent Labs   Lab 12/24/19 0211   *      K 3.0*   *   CO2 18*   BUN 9   CREATININE 0.9   CALCIUM 8.4*   MG 1.4*     CBC:   Recent Labs   Lab 12/23/19  1914 12/23/19  2120 12/24/19  0211 12/24/19  0422   WBC 9.83 13.44* 11.33  --    HGB 14.8 15.8 14.2  --    HCT 42.6 47.2 40.7 41   * 105* 103*  --        Significant Imaging: I have reviewed and interpreted all pertinent imaging  results/findings within the past 24 hours.

## 2019-12-24 NOTE — PROGRESS NOTES
Critical access hospital Medicine  Progress Note    Patient Name: Gonsalo Mei III  MRN: [id number]  Patient Class: IP- Inpatient   Admission Date: 12/23/2019  Length of Stay: 1 days  Attending Physician: Jamaal Mosley MD  Primary Care Provider: Alex Charles MD        Subjective:     Principal Problem:Status epilepticus        HPI:  25 yo AAM with PMH of Epilepsy presented to ED for seizures. Unable to obtain history from patient (intubated/sedated)    Spoke with ER physicain, Dr. Pink who states that patient was BIBA and that he received versed 5mg at home by mother and another 5mg versed by EMS. He was actively seizing upon ED arrival. He was given 4 mg IV Ativan and intubated for Airway protection.  Neurology consulted and recommended stat EEG, Keppra 3g load and c/w home medications.    Per patient's mother, patient has complained for flu like symptoms (runny nose, congestion, fatigue) over the past day. She states that she witnessed the seizure which was like his usual seizure activity (left sided shaking movements with eye deviation to left). She states that she usually gives him Versed 5 mg and seizures resolve, however this time it did not and she called EMS. She states that he had seizures in the past when he had the flu. Last seizure in early spring. Up to date on immunizations including flu shot. compliant with all medications.       Overview/Hospital Course:  12/24 Remains sedated and intubated, though propofol is currently off for EEG.  No seizure activity on EEG as per Dr. Arjun Pinzon. ID attendance noted and appreciated. Tmax 103 F. Labs personally reviewed: leukocytosis has resolved.Has developed hypokalemia (will be covered). Lactate = 3.8. Telemetry personally reviewed = sinus    Interval History: status    Review of Systems   Unable to perform ROS: Intubated     Objective:     Vital Signs (Most Recent):  Temp: 100 °F (37.8 °C) (12/24/19 1001)  Pulse: 68 (12/24/19 1001)  Resp: 16  (12/24/19 1001)  BP: (!) 115/58 (12/24/19 1001)  SpO2: 100 % (12/24/19 1001) Vital Signs (24h Range):  Temp:  [98 °F (36.7 °C)-103.4 °F (39.7 °C)] 100 °F (37.8 °C)  Pulse:  [] 68  Resp:  [16-43] 16  SpO2:  [97 %-100 %] 100 %  BP: ()/() 115/58     Weight: 48.9 kg (107 lb 12.9 oz)  Body mass index is 17.94 kg/m².    Intake/Output Summary (Last 24 hours) at 12/24/2019 1107  Last data filed at 12/24/2019 0600  Gross per 24 hour   Intake 1738.7 ml   Output 1200 ml   Net 538.7 ml      Physical Exam   Constitutional: He appears well-developed and well-nourished.   HENT:   Head: Normocephalic and atraumatic.   Right Ear: External ear normal.   Left Ear: External ear normal.   Nose: Nose normal.   Mouth/Throat: Oropharynx is clear and moist.   Eyes:   Unable to fully asssess   Neck: Normal range of motion. Neck supple.   Cardiovascular: Normal rate, regular rhythm, normal heart sounds and intact distal pulses.   Pulmonary/Chest: Effort normal and breath sounds normal.   Abdominal: Soft. Bowel sounds are normal.   Musculoskeletal:   Unable to fully asssess   Neurological:   Unable to fully asssess   Skin: Skin is warm and dry. Capillary refill takes less than 2 seconds.   Psychiatric: Judgment normal.   Unable to fully asssess   Nursing note and vitals reviewed.      Significant Labs:   BMP:   Recent Labs   Lab 12/24/19 0211   *      K 3.0*   *   CO2 18*   BUN 9   CREATININE 0.9   CALCIUM 8.4*   MG 1.4*     CBC:   Recent Labs   Lab 12/23/19  1914 12/23/19 2120 12/24/19 0211 12/24/19  0422   WBC 9.83 13.44* 11.33  --    HGB 14.8 15.8 14.2  --    HCT 42.6 47.2 40.7 41   * 105* 103*  --        Significant Imaging: I have reviewed and interpreted all pertinent imaging results/findings within the past 24 hours.      Assessment/Plan:      * Status epilepticus  Wean off of propofol and extubate later today or in AM        Influenza B  Per ID recommendations          VTE Risk Mitigation  (From admission, onward)         Ordered     IP VTE LOW RISK PATIENT  Once      12/23/19 2040                      Jamaal Mosley MD  Department of Hospital Medicine   [facility]

## 2019-12-24 NOTE — ED PROVIDER NOTES
Encounter Date: 12/23/2019       History     Chief Complaint   Patient presents with    Seizures     HPI     24-year-old male with past medical history significant for epilepsy on multiple antiepileptic medications who presents to the emergency department for a seizure.  Unable to obtain significant history is the patient is in status epilepticus, started seizing approximately 45 min prior to arrival, was given Versed per the mother, which she states normally terminates his seizures.  However, did not terminate his seizures this time.  Has also been having upper respiratory symptoms, and per the mother, the patient did receive an influenza vaccine prior to today.  She states the patient has been compliant with his AEDs.  The patient did receive 5 mg of Versed prior to arrival from mother, 5 mg from EMS, and received 4 mg emergently here.    Review of patient's allergies indicates:  No Known Allergies  No past medical history on file.  No past surgical history on file.  No family history on file.  Social History     Tobacco Use    Smoking status: Not on file   Substance Use Topics    Alcohol use: Not on file    Drug use: Not on file     Review of Systems   Unable to perform ROS: Patient unresponsive       Physical Exam     Initial Vitals   BP Pulse Resp Temp SpO2   12/23/19 1806 12/23/19 1806 12/23/19 1806 12/23/19 1807 12/23/19 1806   (!) 140/76 (!) 122 (!) 37 (!) 103.4 °F (39.7 °C) 99 %      MAP       --                Physical Exam    Nursing note and vitals reviewed.  Constitutional:   Actively seizing, non-rebreather in place, nonresponsive, no response to threat   HENT:   Head: Normocephalic and atraumatic.   Jaw clenched, temporal muscles spastic bilaterally   Eyes:   Eye deviation towards the left, no icterus   Neck: Neck supple.   No JVD, no step-offs, no deformities   Cardiovascular: Regular rhythm.   No murmur heard.  Tachycardia   Pulmonary/Chest:   No wheezes, no rales   Abdominal: Soft. He exhibits  no distension.   Genitourinary:   Genitourinary Comments: Atraumatic   Neurological:   Unresponsive, has left-sided seizure like activity, gaze deviation towards the left, not following commands   Psychiatric:   Unable to assess         ED Course   Intubation  Date/Time: 12/23/2019 7:18 PM  Location procedure was performed: [facility] EMERGENCY DEPARTMENT  Performed by: Jeff Pink MD  Authorized by: Chai Chang MD   Assisting provider: Chai Chang MD  Pre-operative diagnosis: status epilepticus  Post-operative diagnosis: same  Consent Done: Emergent Situation  Indications: airway protection  Intubation method: direct  Preoxygenation: nonrebreather mask  Pretreatment medications: none  Sedatives: propofol  Paralytic: succinylcholine  Laryngoscope size: Mac 4  Tube size: 7.5 mm  Tube type: cuffed  Number of attempts: 1  Cricoid pressure: no  Cords visualized: yes  Post-procedure assessment: chest rise and ETCO2 monitor  Breath sounds: clear and equal and absent over the epigastrium  Cuff inflated: yes  ETT to lip: 23 cm  Tube secured with: ETT slade  Chest x-ray interpreted by me and radiologist.  Chest x-ray findings: endotracheal tube in appropriate position  Patient tolerance: Patient tolerated the procedure well with no immediate complications  Complications: No    Critical Care  Date/Time: 12/23/2019 8:01 PM  Performed by: Jeff Pink MD  Authorized by: Chai Chang MD   Direct patient critical care time: 15 minutes  Additional history critical care time: 7 minutes  Ordering / reviewing critical care time: 4 minutes  Documentation critical care time: 6 minutes  Consulting other physicians critical care time: 5 minutes  Consult with family critical care time: 5 minutes  Total critical care time (exclusive of procedural time) : 42 minutes  Critical care time was exclusive of separately billable procedures and treating other patients.  Critical care was necessary to treat or prevent imminent or  life-threatening deterioration of the following conditions: CNS failure or compromise and respiratory failure.  Critical care was time spent personally by me on the following activities: review of old charts, ordering and review of laboratory studies, obtaining history from patient or surrogate, evaluation of patient's response to treatment, discussions with consultants, examination of patient, ordering and performing treatments and interventions, ventilator management and re-evaluation of patient's condition.        Labs Reviewed   URINALYSIS, REFLEX TO URINE CULTURE - Abnormal; Notable for the following components:       Result Value    Protein, UA 1+ (*)     Occult Blood UA Trace (*)     All other components within normal limits    Narrative:     Preferred Collection Type->Urine, Clean Catch  Specimen Source->Urine   URINALYSIS MICROSCOPIC - Abnormal; Notable for the following components:    Hyaline Casts, UA 16 (*)     All other components within normal limits    Narrative:     Preferred Collection Type->Urine, Clean Catch  Specimen Source->Urine   CULTURE, BLOOD   CULTURE, BLOOD   CULTURE, URINE   DRUG SCREEN PANEL, URINE EMERGENCY    Narrative:     Preferred Collection Type->Urine, Clean Catch  Specimen Source->Urine   DRUG SCREEN PANEL, URINE EMERGENCY    Narrative:     Preferred Collection Type->Urine, Clean Catch  Specimen Source->Urine   CBC W/ AUTO DIFFERENTIAL   COMPREHENSIVE METABOLIC PANEL   TSH   INFLUENZA A AND B ANTIGEN   LACTIC ACID, PLASMA   POCT GLUCOSE   POCT GLUCOSE MONITORING CONTINUOUS          Imaging Results          X-Ray Chest AP Portable (Final result)  Result time 12/23/19 18:56:32    Final result by Yanick Dickson MD (12/23/19 18:56:32)                 Impression:      1. Endotracheal tube and enteric catheter as above.  2. No acute cardiopulmonary abnormality.      Electronically signed by: Yanick Dickson MD  Date:    12/23/2019  Time:    18:56             Narrative:    EXAMINATION:  XR  CHEST AP PORTABLE    CLINICAL HISTORY:  Encounter for other preprocedural examination placement of endotracheal tube and enteric catheter    FINDINGS:  Portable chest at 1832 compared with 02/08/2018 shows placement of endotracheal tube with tip 3.5 cm proximal to natalee.  Enteric catheter tip lies in stomach, although proximal side port lies approximately 6 cm cranial to estimated GE junction.    Cardiomediastinal silhouette is otherwise normal.  Stimulator device again projects over lateral left hemithorax.    Lungs are clear. Pulmonary vasculature is normal. No acute osseous abnormality.                               X-ray Abdomen for NG Tube Placement (Nursing should notify Radiology after placement) (Final result)  Result time 12/23/19 18:58:06    Final result by Yanick Dickson MD (12/23/19 18:58:06)                 Impression:      Proximal side port of enteric catheter lies 7 cm cranial to estimated GE junction.  If positioning of enteric catheter within stomach is desired, 7-10 cm of advancement are suggested.      Electronically signed by: Yanick Dickson MD  Date:    12/23/2019  Time:    18:58             Narrative:    EXAMINATION:  XR NON-RADIOLOGIST PERFORMED NG/GASTRIC TUBE CHECK    CLINICAL HISTORY:  OG tube placement;    FINDINGS:  Supine abdomen shows proximal side port of enteric catheter approximately 7 cm cranial to estimated GE junction.    Bowel gas pattern is nonobstructive without intra-abdominal mass effect organomegaly.  Right pelvic phleboliths incidentally noted.  Visualized lung bases are clear.  No acute osseous abnormality.  Right femoral neck bone island present.                                 Medical Decision Making:   Initial Assessment:   Assessment:  24-year-old male with status epilepticus    Ddx includes but is not limited to:  Febrile seizure, meningitis, status epilepticus, intracranial lesion, electrolyte abnormality, subtherapeutic antiepileptic medications, sepsis    Plan:   Emergent evaluation of a 24-year-old male for status epilepticus.  Patient is febrile with a temperature of a 103.4° upon arrival, also noted to be tachycardic.  He was actively seizing upon arrival, did administer 4 mg of Ativan, and he had received a total of 10 mg of Versed prior to arrival.  The patient did continue to have seizure-like activity, and was emergently intubated as above.  The patient was placed on a propofol drip, and received 2 g of Rocephin given concern for possible meningitis.  However, the patient was noted to have cough and URI symptoms prior to arrival, and is flu positive.  Feel that this is likely influenza related, and I did speak with the mother of the child about administration of Tamiflu, however she states that he has had seizures in the past when given Tamiflu.  Because of this, will defer administration of Tamiflu.  I did speak with Neurology, and they recommended a stat EEG, which I have ordered.  They also recommended 3 g of Keppra, which I will also order.  The patient's head CT that is atraumatic. No evidence of shift.  The patient will be admitted to the ICU as he is currently intubated and sedated.  Remains stable at this time.      Jeff Pink, HO-3  12/23/2019 7:59 PM                Attending Attestation:   Physician Attestation Statement for Resident:  As the supervising MD   Physician Attestation Statement: I have personally seen and examined this patient.   I agree with the above history. -:   As the supervising MD I agree with the above PE.    As the supervising MD I agree with the above treatment, course, plan, and disposition.  I was personally present during the entire procedure.  I have reviewed and agree with the residents interpretation of the following: lab data and x-rays.                    ED Course as of Dec 23 1918   Mon Dec 23, 2019   1857 Pt vs are improving HR is 101. No seizure activity, labs pending.     [MP]      ED Course User Index  [MP] Chai  MD Charlene                Clinical Impression:       ICD-10-CM ICD-9-CM   1. Status epilepticus G40.901 345.3   2. Encounter for intubation Z01.818 V72.83   3. Seizure R56.9 780.39   4. Influenza B J10.1 487.1                             Jeff Pink MD  Resident  12/23/19 2004

## 2019-12-24 NOTE — SUBJECTIVE & OBJECTIVE
PMH  Epilepsy  Developmental delay    No past surgical history on file.    Review of patient's allergies indicates:  No Known Allergies    No current facility-administered medications on file prior to encounter.      Current Outpatient Medications on File Prior to Encounter   Medication Sig    clorazepate (TRANXENE) 3.75 MG Tab Take 7.5 mg by mouth 2 (two) times daily.     eslicarbazepine (APTIOM) 200 mg Tab Take 200 mg by mouth once daily.     eslicarbazepine (APTIOM) 800 mg Tab Take 800 mg by mouth every evening.     risperidone (RISPERDAL M-TABS) 1 MG TbDL Take 1 mg by mouth once daily.     topiramate (QUDEXY XR) 100 mg CSpX Take 200 mg by mouth once daily.     topiramate (QUDEXY XR) 100 mg CSpX Take 300 mg by mouth every evening.     Family History     Reviewed with patient's mother and non-contributory        Tobacco Use    Smoking status: never   Substance and Sexual Activity    Alcohol use: never    Drug use: never    Sexual activity: unknown   Social hx reviewed with patient's mother    Review of Systems   Unable to perform ROS: Intubated     Objective:     Vital Signs (Most Recent):  Temp: (!) 100.8 °F (38.2 °C) (12/23/19 2002)  Pulse: 98 (12/23/19 2032)  Resp: 19 (12/23/19 2003)  BP: 136/67 (12/23/19 2032)  SpO2: 98 % (12/23/19 2032) Vital Signs (24h Range):  Temp:  [100.8 °F (38.2 °C)-103.4 °F (39.7 °C)] 100.8 °F (38.2 °C)  Pulse:  [] 98  Resp:  [18-37] 19  SpO2:  [98 %-100 %] 98 %  BP: ()/(49-76) 136/67     Weight: 44.5 kg (98 lb)  Body mass index is 16.31 kg/m².    Physical Exam   Constitutional:   Intubated/sedated     HENT:   Head: Normocephalic and atraumatic.   Et tube   Eyes: Pupils are equal, round, and reactive to light. No scleral icterus.   Neck: Neck supple.   Cardiovascular: Normal rate, normal heart sounds and intact distal pulses.   tachy   Pulmonary/Chest: He has no wheezes. He has no rales.   Intubated  Mechanical breath sounds  No wheeze,rales,   Abdominal: Soft.  Bowel sounds are normal. He exhibits no distension.   Genitourinary:   Genitourinary Comments: Rice    Musculoskeletal: He exhibits no edema.   No rigidity   Lymphadenopathy:     He has no cervical adenopathy.   Neurological:   unresposive   Skin: Skin is warm and dry. No rash noted. No erythema.   Psychiatric:   Unable to assess 2/2 acuity   Nursing note and vitals reviewed.       Significant Labs:   ABGs:   Recent Labs   Lab 12/23/19 1959   PH 7.373   PCO2 30.2*   HCO3 17.6*   POCSATURATED 100   BE -8     CBC:   Recent Labs   Lab 12/23/19 1914   WBC 9.83   HGB 14.8   HCT 42.6   *     CMP:   Recent Labs   Lab 12/23/19 1914      K 3.8   *   CO2 20*   GLU 99   BUN 10   CREATININE 1.0   CALCIUM 7.8*   PROT 6.2   ALBUMIN 4.0   BILITOT 0.6   ALKPHOS 78   AST 19   ALT 18   ANIONGAP 8   EGFRNONAA >60.0     TSH:   Recent Labs   Lab 12/23/19 1914   TSH 0.550     Urine Studies:   Recent Labs   Lab 12/23/19 1846   COLORU Yellow   APPEARANCEUA Clear   PHUR 6.0   SPECGRAV 1.020   PROTEINUA 1+*   GLUCUA Negative   KETONESU Negative   BILIRUBINUA Negative   OCCULTUA Trace*   NITRITE Negative   UROBILINOGEN Negative   LEUKOCYTESUR Negative   RBCUA 3   WBCUA 1   BACTERIA Negative   SQUAMEPITHEL 3   HYALINECASTS 16*     All pertinent labs within the past 24 hours have been reviewed.    Significant Imaging: I have reviewed all pertinent imaging results/findings within the past 24 hours.   EKG, personally reviewed, Sinus tach with no ST elevation        Imaging Results          CT Head Without Contrast (Final result)  Result time 12/23/19 19:42:38    Final result by Yanick Dickson MD (12/23/19 19:42:38)                 Impression:      No acute intracranial abnormality.      Electronically signed by: Yanick Dickson MD  Date:    12/23/2019  Time:    19:42             Narrative:      CMS MANDATED QUALITY DATA - CT RADIATION - 436    All CT scans at this facility utilize dose modulation, iterative reconstruction,  and/or weight based dosing when appropriate to reduce radiation dose to as low as reasonably achievable.    EXAMINATION:  CT HEAD WITHOUT CONTRAST    CLINICAL HISTORY:  seizure,febrile;    TECHNIQUE:  Head CT without IV contrast.    COMPARISON:  02/08/2018    FINDINGS:  Gray-white differentiation is maintained without hemorrhage, midline shift, or mass effect.    The ventricles and cisterns are maintained.    Calvarium is intact. Visualized sinuses are clear. Presumed endotracheal tube and enteric catheter partially visualized within the oral cavity.                               X-Ray Chest AP Portable (Final result)  Result time 12/23/19 18:56:32    Final result by Yanick Dickson MD (12/23/19 18:56:32)                 Impression:      1. Endotracheal tube and enteric catheter as above.  2. No acute cardiopulmonary abnormality.      Electronically signed by: Yanick Dickson MD  Date:    12/23/2019  Time:    18:56             Narrative:    EXAMINATION:  XR CHEST AP PORTABLE    CLINICAL HISTORY:  Encounter for other preprocedural examination placement of endotracheal tube and enteric catheter    FINDINGS:  Portable chest at 1832 compared with 02/08/2018 shows placement of endotracheal tube with tip 3.5 cm proximal to natalee.  Enteric catheter tip lies in stomach, although proximal side port lies approximately 6 cm cranial to estimated GE junction.    Cardiomediastinal silhouette is otherwise normal.  Stimulator device again projects over lateral left hemithorax.    Lungs are clear. Pulmonary vasculature is normal. No acute osseous abnormality.                               X-ray Abdomen for NG Tube Placement (Nursing should notify Radiology after placement) (Final result)  Result time 12/23/19 18:58:06    Final result by Yanick Dickson MD (12/23/19 18:58:06)                 Impression:      Proximal side port of enteric catheter lies 7 cm cranial to estimated GE junction.  If positioning of enteric catheter within stomach  is desired, 7-10 cm of advancement are suggested.      Electronically signed by: Yanick Dickson MD  Date:    12/23/2019  Time:    18:58             Narrative:    EXAMINATION:  XR NON-RADIOLOGIST PERFORMED NG/GASTRIC TUBE CHECK    CLINICAL HISTORY:  OG tube placement;    FINDINGS:  Supine abdomen shows proximal side port of enteric catheter approximately 7 cm cranial to estimated GE junction.    Bowel gas pattern is nonobstructive without intra-abdominal mass effect organomegaly.  Right pelvic phleboliths incidentally noted.  Visualized lung bases are clear.  No acute osseous abnormality.  Right femoral neck bone island present.

## 2019-12-24 NOTE — NURSING
Notified Dr. March mother's concerns in reference to patient needing Ativan as patient is  maxed out on propofol gtt and still moving. Dr. March ordered restraints, Ativan, and tylenol for reported temperature of 103.3.

## 2019-12-24 NOTE — PLAN OF CARE
This note also relates to the following rows which could not be included:  Oxygen Concentration (%) - Cannot attach notes to unvalidated device data  SpO2 - Cannot attach notes to unvalidated device data  Pulse - Cannot attach notes to unvalidated device data  Resp - Cannot attach notes to unvalidated device data  Ventilation Type - Cannot attach notes to unvalidated device data  Vent Mode - Cannot attach notes to unvalidated device data  Set Rate - Cannot attach notes to unvalidated device data  Vt Set - Cannot attach notes to unvalidated device data  PEEP/CPAP - Cannot attach notes to unvalidated device data  Pressure Support - Cannot attach notes to unvalidated device data  Waveform - Cannot attach notes to unvalidated device data  Peak Flow - Cannot attach notes to unvalidated device data  Plateau Set/Insp. Hold (sec) - Cannot attach notes to unvalidated device data  Trigger Sensitivity Flow/I-Trigger - Cannot attach notes to unvalidated device data  Resp Rate Total - Cannot attach notes to unvalidated device data  Peak Airway Pressure - Cannot attach notes to unvalidated device data  Mean Airway Pressure - Cannot attach notes to unvalidated device data  Plateau Pressure - Cannot attach notes to unvalidated device data  Exhaled Vt - Cannot attach notes to unvalidated device data  Total Ve - Cannot attach notes to unvalidated device data  I:E Ratio Measured - Cannot attach notes to unvalidated device data  Resp Rate High Alarm - Cannot attach notes to unvalidated device data  Press High Alarm - Cannot attach notes to unvalidated device data  Apnea Rate - Cannot attach notes to unvalidated device data  Apnea Volume (mL) - Cannot attach notes to unvalidated device data  Apnea Oxygen Concentration  - Cannot attach notes to unvalidated device data  Apnea Flow Rate (L/min) - Cannot attach notes to unvalidated device data  T Apnea - Cannot attach notes to unvalidated device data       12/24/19 0729   Patient  Assessment/Suction   Level of Consciousness (AVPU) alert   Respiratory Effort Mild   Expansion/Accessory Muscles/Retractions accessory muscle use   All Lung Fields Breath Sounds crackles, coarse   Rhythm/Pattern, Respiratory assisted mechanically   $ Suction Charges Inline Suction Procedure Stat Charge   Secretions Amount small   Secretions Color tan   Secretions Characteristics thin   Sputum Collection sample obtained per suctioning   $ Swab or suction? Suction   Aspirate Toleration ORLY (no adverse reactions)   Is this patient in SNF or Inpatient Rehab? Yes        Airway - Non-Surgical 12/23/19 1818 Endotracheal Tube   Placement Date/Time: 12/23/19 1818   Inserted by: MD  Airway Device: Endotracheal Tube  Mask Ventilation: Easy  Airway Device Size: 7.5  Style: Cuffed  Cuff Inflated With: Air  Placement Verified By: Auscultation  Complicating Factors: None  Depth of ...   Secured at 22 cm   Measured At Lips   Secured Location Center   Secured by Commercial tube slade   Bite Block none   Site Condition Cool;Dry   Status Intact;Secured   Site Assessment Clean;Dry   Vent Select   Conventional Vent Y   $ Ventilator Subsequent 1   Charged w/in last 24h YES   IHI Ventilator Associated Pneumonia Bundle   Head of Bed Elevated  HOB 30   Oral Care Lip moisturizer applied;Mouth swabbed   Additional VAP Prevention Documentation Clean equipment maintained   Preset Conventional Ventilator Settings   Vent ID [id number]   Conventional Ventilator Alarms   Alarms On Y   continue ventilator management

## 2019-12-24 NOTE — HPI
23 yo AAM with PMH of Epilepsy presented to ED for seizures. Unable to obtain history from patient (intubated/sedated)    Spoke with ER physicain, Dr. Pink who states that patient was BIBA and that he received versed 5mg at home by mother and another 5mg versed by EMS. He was actively seizing upon ED arrival. He was given 4 mg IV Ativan and intubated for Airway protection.  Neurology consulted and recommended stat EEG, Keppra 3g load and c/w home medications.    Per patient's mother, patient has complained for flu like symptoms (runny nose, congestion, fatigue) over the past day. She states that she witnessed the seizure which was like his usual seizure activity (left sided shaking movements with eye deviation to left). She states that she usually gives him Versed 5 mg and seizures resolve, however this time it did not and she called EMS. She states that he had seizures in the past when he had the flu. Last seizure in early spring. Up to date on immunizations including flu shot. compliant with all medications.

## 2019-12-24 NOTE — CONSULTS
Consult Note  Infectious Disease    Reason for Consult:  fever    HPI: Gonsalo Mei III is a healthy appearing 24 y.o. male with a history of epilepsy since childhood who was in his usual state of health on 12/22, but yesterday was on the couch all day, less talkative, eating normally who abruptly began to have seizures. His family gave his intranasal Versed without resolution and he was brought to the ED. In the ED he had a 103 degree temperature and was found to be Influenza B positive. He had persistent seizure activity despite 4 mg of Ativan and he was intubated, given a large load of Keppra and admitted to the ICU. He did receive the flu vaccine in October, but does spend time in day community with other adults with disabilities. He did have a runny nose and mild cough prior to presentation. Family denies that he complained of headache, diarrhea, rash, shortness of breath. No other family members are ill. He has a history of lowered seizure threshold with Tamiflu and this was not prescribed. Infections have triggered seizures in the past.     Review of patient's allergies indicates:   Allergen Reactions    Tamiflu [oseltamivir] Other (See Comments)     seizures     Past Medical History:   Diagnosis Date    Cerebellar atrophy     by MRI    Epilepsy      Past Surgical History:   Procedure Laterality Date    TONSILLECTOMY      vagal nerve stimulator (2004) Left 2004     Social History     Socioeconomic History    Marital status: Single     Spouse name: Not on file    Number of children: Not on file    Years of education: Not on file    Highest education level: Not on file   Occupational History    Not on file   Social Needs    Financial resource strain: Not on file    Food insecurity:     Worry: Not on file     Inability: Not on file    Transportation needs:     Medical: Not on file     Non-medical: Not on file   Tobacco Use    Smoking status: Never Smoker   Substance and Sexual Activity     Alcohol use: Never     Frequency: Never    Drug use: Never    Sexual activity: Never     Comment: per mom   Lifestyle    Physical activity:     Days per week: Not on file     Minutes per session: Not on file    Stress: Not on file   Relationships    Social connections:     Talks on phone: Not on file     Gets together: Not on file     Attends Rastafari service: Not on file     Active member of club or organization: Not on file     Attends meetings of clubs or organizations: Not on file     Relationship status: Not on file   Other Topics Concern    Not on file   Social History Narrative    Not on file     Family History   Problem Relation Age of Onset    Asthma Mother     Diabetes Paternal Aunt     Heart disease Paternal Aunt     Hypertension Paternal Aunt     Diabetes Maternal Grandmother     Early death Maternal Grandmother     Heart disease Maternal Grandmother     Hypertension Maternal Grandmother     Stroke Maternal Grandmother     Cancer Maternal Grandmother     Heart disease Maternal Grandfather     Hypertension Maternal Grandfather        Pertinent medications noted:     Review of Systems:  From mother/family  No chills, fever, sweats, until arrival    post nasal drip and mild cough  No pain in mouth or throat. No problems with teeth, gums.  No chest pain,  syncope  Minimal cough, no sputum production, shortness of breath,    No nausea, vomiting, diarrhea, constipation,  or focal abd pain,  No dysphagia, odynophagia. Ate a meal yesterday  No   Hematuria,  or risk for STD  No swelling of joints, redness of joints, injuries, or new focal pain  No unusual headaches, dizziness,  falls  No anxiety, depression,  But takes risperdal to counteract side effects of the anti-epileptics  No diabetes, thyroid,  l conditions  No bleeding, lymphadenopathy, anemia, malignancy, unusual bruising  No new rashes, lesions, or wounds   Implants: has a vagal nerve stimulator left chest, for seizures and had a  battery change last year.   Antibiotic History:     EXAM & DIAGNOSTICS REVIEWED:   Vitals:     Temp:  [98 °F (36.7 °C)-103.4 °F (39.7 °C)]   Temp: (P) 99.8 °F (37.7 °C) (12/24/19 0901)  Pulse: 98 (12/24/19 0901)  Resp: (!) 30 (12/24/19 0901)  BP: (!) 146/96 (12/24/19 0901)  SpO2: 100 % (12/24/19 0901)    Intake/Output Summary (Last 24 hours) at 12/24/2019 0952  Last data filed at 12/24/2019 0600  Gross per 24 hour   Intake 1738.7 ml   Output 1200 ml   Net 538.7 ml       General:  In NADsedated on the ventilator, comfortable  Eyes:  Anicteric, PERRL, EOM cannot be tested  ENT:  No ulcers, exudates, thrush, nares patent, dentition is good  Neck:  supple, no masses or adenopathy appreciated  Lungs: Clear, no consolidation, rales, wheezes, rub  Heart:  RRR, no gallop/murmur/rub noted  Abd:  Soft, NT, ND, normal BS, no masses or organomegaly appreciated. Orogastric tube in place  :   Rice, urine clear, no flank tenderness  Musc:  Joints without effusion, swelling, erythema, synovitis, muscle wasting.   Skin:  No rashes. No palmar or plantar lesions. No subungual petechiae  Wound:   Neuro: Sedated on the ventilator  Psych:  Sedated on the ventilator  Lymphatic:     No cervical, supraclavicular, axillary, or inguinal nodes  Extrem: No edema, erythema, phlebitis, cellulitis, warm and well perfused  VAD:  peripherals   Isolation:  droplet    Lines/Tubes/Drains:    General Labs reviewed:  Recent Labs   Lab 12/23/19 1914 12/23/19 2120 12/24/19 0211 12/24/19 0422   WBC 9.83 13.44* 11.33  --    HGB 14.8 15.8 14.2  --    HCT 42.6 47.2 40.7 41   * 105* 103*  --        Recent Labs   Lab 12/23/19 1914 12/24/19 0211    140   K 3.8 3.0*   * 112*   CO2 20* 18*   BUN 10 9   CREATININE 1.0 0.9   CALCIUM 7.8* 8.4*   PROT 6.2 6.1   BILITOT 0.6 0.7   ALKPHOS 78 68   ALT 18 17   AST 19 20       Influenza B positive    Micro:  Microbiology Results (last 7 days)     Procedure Component Value Units Date/Time     Culture, Respiratory with Gram Stain [[id number]] Collected:  12/23/19 2010    Order Status:  Completed Specimen:  Respiratory from Tracheal Aspirate Updated:  12/24/19 0853     Respiratory Culture Normal respiratory juan     Gram Stain (Respiratory) Moderate WBC's     Gram Stain (Respiratory) Moderate Gram positive cocci     Gram Stain (Respiratory) Few Gram positive rods     Gram Stain (Respiratory) Few Gram negative coccobacilli    Urine culture High Risk **CANNOT BE ORDERED STAT** [[id number]] Collected:  12/23/19 1846    Order Status:  Completed Specimen:  Urine, Catheterized Updated:  12/24/19 0656     Urine Culture, Routine No growth to date    Narrative:       Indicated criteria for high risk culture:->Other  Other (specify):->intubated, febrile    Blood culture #1 **CANNOT BE ORDERED STAT** [[id number]] Collected:  12/23/19 1945    Order Status:  Completed Specimen:  Blood from Peripheral, Forearm, Right Updated:  12/24/19 0317     Blood Culture, Routine No Growth to date    Blood culture #2 **CANNOT BE ORDERED STAT** [[id number]] Collected:  12/23/19 1958    Order Status:  Completed Specimen:  Blood from Peripheral, Hand, Right Updated:  12/24/19 0317     Blood Culture, Routine No Growth to date        Imaging Reviewed:   CXR clear   CT head negative    Cardiology:    IMPRESSION & PLAN   1. Status epilepticus, epilepsy since age 3  2. Influenza B with high fever and lactic acidosis  3. Developmentally delayed      Recommendations:  xofluza 40 mg per tube(crushing may change bioavailability, but logic would suggest that the Tmax would be shorter, and T1/2 no different )  Small doses of steroid for temperature refractory to tylenol or ibuprofen  Scheduled tylenol to blunt fever  Droplet isolation

## 2019-12-24 NOTE — NURSING
Pt temp 103..4, notified Dr. Redman for further management. Orders received for ID consult and place on cooling blanket. Pt was given a cool bath and placed on a cooling blanket as per orders.

## 2019-12-24 NOTE — ASSESSMENT & PLAN NOTE
Hx of epilepsy/seizure disorder  Followed by Ped Neurology at [facility]  Intubated in ED  pulmonary consulted   Admit to ICU  Neurology consulted in ED  Personally spoke with Dr. Arjun Pinzon who recommended keppra 3g load, EEG, resume home meds, close monitoring  Seizure precautions  EEG pending  Ativan prn anxiety or seizures  C/w propofol gtt  precedex if needed

## 2019-12-24 NOTE — CONSULTS
[facility]  Neurology  Consult Note    Patient Name: Gonsalo Mei III  MRN: [id number]  Admission Date: 12/23/2019  Hospital Length of Stay: 1 days  Code Status: Full Code   Attending Provider: Jamaal Mosley MD   Consulting Provider: Isa Can NP  Primary Care Physician: Alex Charles MD  Principal Problem:Status epilepticus    Inpatient consult to neurology  Consult performed by: Isa Can NP  Consult ordered by: Sia March DO        Subjective:     Chief Complaint:    Chief Complaint   Patient presents with    Seizures          HPI: 25 yo AAM with PMH of Epilepsy presented to ED for seizures. Unable to obtain history from patient (intubated/sedated)     Spoke with ER Dr. Joesph yu who states that patient was BIBA and that he received versed 5mg at home by mother and another 5mg versed by EMS. He was actively seizing upon ED arrival. He was given 4 mg IV Ativan and intubated for Airway protection.  Neurology consulted and recommended stat EEG, Keppra 3g load and c/w home medications.    Per patient's mother, patient has complained for flu like symptoms (runny nose, congestion, fatigue) over the past day. She states that she witnessed the seizure which was like his usual seizure activity (left sided shaking movements with eye deviation to left). She states that she usually gives him Versed 5 mg and seizures resolve, however this time it did not and she called EMS. She states that he had seizures in the past when he had the flu. Last seizure in early spring. Up to date on immunizations including flu shot. compliant with all medications.     Neurological interval note: Patient seen and examined with Dr. Arjun Pinzon. Patient exam completed while on sedation Diprivan. Patient is now being weaned off sedation. Limited exam. Unable to follow commands, not responsive to painful stimulation. No family at bedside. Patient started feeling bad while at work according to staff and  reportedly told by his mom he wasn't feeling well after leaving work. The patient was brought to ED and found to be positive for Influenza B. Per patient's mother, patient has complained for flu like symptoms (runny nose, congestion, fatigue) over the past day. She states that she witnessed the seizure which was like his usual seizure activity (left sided shaking movements with eye deviation to left). She states that she usually gives him Versed 5 mg and seizures resolve, however this time it did not and she called EMS. CT of head without contrast negative acute. Will check AED levels, encephalopathy labs,        Past Medical History:   Diagnosis Date    Seizures        Past Surgical History:   Procedure Laterality Date    TONSILLECTOMY      vagal nerve stimulator (2004) Left 2004       Review of patient's allergies indicates:   Allergen Reactions    Tamiflu [oseltamivir] Other (See Comments)     seizures       Current Neurological Medications:  No current facility-administered medications on file prior to encounter.      Current Outpatient Medications on File Prior to Encounter   Medication Sig Dispense Refill    clorazepate (TRANXENE) 3.75 MG Tab Take 7.5 mg by mouth 2 (two) times daily.       eslicarbazepine (APTIOM) 200 mg Tab Take 200 mg by mouth once daily.       eslicarbazepine (APTIOM) 800 mg Tab Take 800 mg by mouth every evening.       risperidone (RISPERDAL M-TABS) 1 MG TbDL Take 1 mg by mouth once daily.       topiramate (QUDEXY XR) 100 mg CSpX Take 200 mg by mouth once daily.       topiramate (QUDEXY XR) 100 mg CSpX Take 300 mg by mouth every evening.      diazepam (DIASTAT) 2.5 mg Kit Place 20 mg rectally.       lacosamide (VIMPAT) 200 mg Tab Take by mouth 2 (two) times daily.      lamotrigine (LAMICTAL) 100 MG tablet Take 300 mg by mouth once daily.       lisdexamfetamine (VYVANSE) 30 MG capsule Take 30 mg by mouth every morning.      phenytoin (DILANTIN) 50 mg chewable tablet Take 50  mg by mouth before breakfast.      phenytoin (DILANTIN) 50 mg chewable tablet Take 75 mg by mouth every evening.         No current facility-administered medications on file prior to encounter.      Current Outpatient Medications on File Prior to Encounter   Medication Sig    clorazepate (TRANXENE) 3.75 MG Tab Take 7.5 mg by mouth 2 (two) times daily.     eslicarbazepine (APTIOM) 200 mg Tab Take 200 mg by mouth once daily.     eslicarbazepine (APTIOM) 800 mg Tab Take 800 mg by mouth every evening.     risperidone (RISPERDAL M-TABS) 1 MG TbDL Take 1 mg by mouth once daily.     topiramate (QUDEXY XR) 100 mg CSpX Take 200 mg by mouth once daily.     topiramate (QUDEXY XR) 100 mg CSpX Take 300 mg by mouth every evening.    diazepam (DIASTAT) 2.5 mg Kit Place 20 mg rectally.     lacosamide (VIMPAT) 200 mg Tab Take by mouth 2 (two) times daily.    lamotrigine (LAMICTAL) 100 MG tablet Take 300 mg by mouth once daily.     lisdexamfetamine (VYVANSE) 30 MG capsule Take 30 mg by mouth every morning.    phenytoin (DILANTIN) 50 mg chewable tablet Take 50 mg by mouth before breakfast.    phenytoin (DILANTIN) 50 mg chewable tablet Take 75 mg by mouth every evening.      Family History     Problem Relation (Age of Onset)    Asthma Mother    Cancer Maternal Grandmother    Diabetes Paternal Aunt, Maternal Grandmother    Early death Maternal Grandmother    Heart disease Paternal Aunt, Maternal Grandmother, Maternal Grandfather    Hypertension Paternal Aunt, Maternal Grandmother, Maternal Grandfather    Stroke Maternal Grandmother        Tobacco Use    Smoking status: Never Smoker   Substance and Sexual Activity    Alcohol use: Never     Frequency: Never    Drug use: Never    Sexual activity: Never     Comment: per mom     Review of Systems   Unable to perform ROS: Intubated   Neurological: Positive for seizures.     Objective:     Vital Signs (Most Recent):  Temp: 98 °F (36.7 °C) (12/24/19 0600)  Pulse: 64 (12/24/19  0729)  Resp: 18 (12/24/19 0729)  BP: 128/69 (12/24/19 0729)  SpO2: 100 % (12/24/19 0729) Vital Signs (24h Range):  Temp:  [98 °F (36.7 °C)-103.4 °F (39.7 °C)] 98 °F (36.7 °C)  Pulse:  [] 64  Resp:  [18-43] 18  SpO2:  [97 %-100 %] 100 %  BP: ()/() 128/69     Weight: 48.9 kg (107 lb 12.9 oz)  Body mass index is 17.94 kg/m².    Physical Exam   Constitutional: He appears well-developed and well-nourished.   HENT:   Head: Normocephalic and atraumatic.   Eyes: Pupils are equal, round, and reactive to light. Conjunctivae, EOM and lids are normal. Right eye exhibits normal extraocular motion.   Neck: Normal range of motion. Neck supple.   Cardiovascular: Normal rate.   Pulmonary/Chest: Effort normal and breath sounds normal.   Patient on vent with sedation on    Abdominal: Soft. Bowel sounds are normal.   Musculoskeletal: Normal range of motion.   Neurological: He is unresponsive. GCS eye subscore is 1 - none. GCS verbal subscore is 1 - none. GCS motor subscore is 1 - none.   Reflex Scores:       Tricep reflexes are 2+ on the right side and 2+ on the left side.       Bicep reflexes are 2+ on the right side and 2+ on the left side.       Brachioradialis reflexes are 2+ on the right side and 2+ on the left side.       Patellar reflexes are 2+ on the right side and 2+ on the left side.       Achilles reflexes are 2+ on the right side and 2+ on the left side.  Unable to fully assess sedation on, weaning sedation off at this time    Skin: Skin is warm and dry. Capillary refill takes less than 2 seconds.   Psychiatric: Cognition and memory are impaired. He is noncommunicative.   Withdrawn, noncommunicative at this time patient sedated, intubated on vent, not following commands at this time, cough and gag reflex positive        NEUROLOGICAL EXAMINATION:     MENTAL STATUS   Level of consciousness: unresponsive to painful stimuli  Knowledge: inconsistent with education and poor.     CRANIAL NERVES     CN III, IV,  VI   Pupils are equal, round, and reactive to light.  Extraocular motions are normal.     MOTOR EXAM   Muscle bulk: normal  Overall muscle tone: normal  Right arm tone: normal  Left arm tone: normal  Right leg tone: normal  Left leg tone: normal    REFLEXES     Reflexes   Right brachioradialis: 2+  Left brachioradialis: 2+  Right biceps: 2+  Left biceps: 2+  Right triceps: 2+  Left triceps: 2+  Right patellar: 2+  Left patellar: 2+  Right achilles: 2+  Left achilles: 2+  Right : 2+  Left : 2+    [place] Coma Scale:  Best Eye Response: 1 - none  Best Motor Response: 1 - none  Best Verbal Response: 1 - none  [place] Coma Scale Total: 3      Significant Labs:   BMP:   Recent Labs   Lab 12/23/19  1914 12/24/19  0211   GLU 99 121*    140   K 3.8 3.0*   * 112*   CO2 20* 18*   BUN 10 9   CREATININE 1.0 0.9   CALCIUM 7.8* 8.4*   MG  --  1.4*     Lab Results   Component Value Date    TSH 0.550 12/23/2019       Significant Imaging:  CT of Head w/o contrast  Impression       No acute intracranial abnormality.     EEG 12/23  IMPRESSION: Mild to moderate encephalopathy. No Seizures.    Repeat EEG 12/24     Assessment and Plan:  24 year old AA male exam completed while on sedation (Diprivan). Patient is now being weaned off sedation. Limited exam. Unable to follow commands, not responsive to painful stimulation.      1.Status Epilepticus  -EEG  Mild to moderate encephalopathy. No Seizures  -Repeat EEG pending .    CT of head without contrast negative acute  2. Encephalopathy      - Encephalopathy labs, Repeat EEG   -Check AED levels  Topiramate level oxcarbazepine level, Keppra level    3.History of seizures  -Continue Home AED meds  -Seizures precautions     4.Fever  -Managed per IM    5. Influenza Type B Positive   -Managed per IM     Patient to follow up with [facility] at [phone] within 2 weeks from discharge.                     Active Diagnoses:    Diagnosis Date Noted POA    PRINCIPAL  PROBLEM:  Status epilepticus [G40.901] 12/23/2019 Yes    Influenza B [J10.1] 12/23/2019 Yes      Problems Resolved During this Admission:       VTE Risk Mitigation (From admission, onward)         Ordered     IP VTE LOW RISK PATIENT  Once      12/23/19 2040                Thank you for your consult. I will follow-up with patient. Please contact us if you have any additional questions.    Isa Can, LUCRECIA  Neurology  [facility]    I, Dr. Arjun Pinzon, have personally seen and examined the patient with my advanced provider and agree with above. I personally did a focused exam, and reviewed all necessary clinical information. I discussed my management plan with my NP and agree with above. Improving, following commands. Mild irratibility with keppra, no suicidal ideation as per mother. Continue Keppra. Wean until 500 mg bid.

## 2019-12-24 NOTE — PLAN OF CARE
12/24/19 0422   Patient Assessment/Suction   Level of Consciousness (AVPU) unresponsive   Respiratory Effort Normal;Unlabored   Expansion/Accessory Muscles/Retractions no use of accessory muscles   All Lung Fields Breath Sounds clear;equal bilaterally   Rhythm/Pattern, Respiratory assisted mechanically   $ Suction Charges Inline Suction Procedure Stat Charge   Secretions Amount scant   Secretions Color cloudy;white   Secretions Characteristics thin   PRE-TX-O2   O2 Device (Oxygen Therapy) ventilator   Pulse Oximetry Type Continuous   $ Pulse Oximetry - Multiple Charge Pulse Oximetry - Multiple   Wound Care   $ Wound Care Tech Time 15 min   Area of Concern Lower lip   Skin Color/Characteristics without discoloration   Skin Temperature warm        Airway - Non-Surgical 12/23/19 1818 Endotracheal Tube   Placement Date/Time: 12/23/19 1818   Inserted by: MD  Airway Device: Endotracheal Tube  Mask Ventilation: Easy  Airway Device Size: 7.5  Style: Cuffed  Cuff Inflated With: Air  Placement Verified By: Auscultation  Complicating Factors: None  Depth of ...   Secured at 23 cm   Measured At Lips   Secured Location Right   Secured by Commercial tube slade   Bite Block none   Site Condition Cool;Dry   Status Intact;Secured   Site Assessment Clean;Dry   Cuff Volume   (MLT)   Respiratory Interventions   Airway/Ventilation Management airway patency maintained   VAP Prevention Bundle VTE prophylaxis provided   Vent Select   Conventional Vent Y   Charged w/in last 24h YES   Preset Conventional Ventilator Settings   Vent ID [id number]   Vent Type    Conventional Ventilator Alarms   Alarms On Y   Labs   $ Was an ABG obtained? Arterial Puncture;ISTAT - Blood gas;ISTAT - Calcium;ISTAT - Hematocrit;ISTAT - Potassium;ISTAT - Sodium   $ Labs Tech Time 15 min   Respiratory Evaluation   $ Care Plan Tech Time 15 min   Evaluation For New Orders   Admitting Diagnosis Influenza   Pulmonary Diagnosis Resp failure   a

## 2019-12-24 NOTE — PROCEDURES
REASON FOR STUDY:  Seizures.  DATE OF STUDY: 12/23/2019.  PHYSICIAN REQUESTING/INSTITUTION: Dr. March.   AED: Precedex, Keppra, Aptium, ativan..  METHODS:  EEG was done according to the 10/20 international system.  This is a 20 min stat EEG. Bipolar and referential montages were used. Video recording was used during the study.      CLINICAL HISTORY:  24 year old patient with recurrent seizures.     FINDINGS:  EEG overall symmetric with continuous low amplitude polymorphic waveforms.       Background rhythm: Theta dominant while eyes are closed and the patient is awake.  PDR: 5-8 Hz   Mental status: Patient is comatose during the study.       Artifact: There is occasional eye movement, lead and EKG artifacts.       Sleep: None.         Epileptiform discharges: None     Activation procedures:  None     ECG: Regular rhythm          IMPRESSION: Mild to moderate encephalopathy. No Seizures.

## 2019-12-24 NOTE — NURSING
Admitted from the Ed on the ventilator s/p status epilepticus uncontrolled by intranasal versed at home given by mother. Upon further workup patient has tested positive for the flu and has been placed on droplet precautions. Cardiac monitor has been applied with continous pulse ox. All airway precautions in effect and. EEG tech at bedside to a spot EEG for Dr. Godinez.  Hemodynamics as charted. Full assessment and plan of care discussed with mother who accompanied the patient up from the emergency room.

## 2019-12-24 NOTE — H&P
[facility]  History & Physical    Patient Name: Gonsalo Mei III  MRN: [id number]  Admission Date: 12/23/2019  Attending Physician: Sia March DO   Primary Care Provider: Alex Charles MD         Patient information was obtained from relative(s), past medical records and ER records.     Subjective:     Principal Problem:Status epilepticus    Chief Complaint:   Chief Complaint   Patient presents with    Seizures        HPI: 25 yo AAM with PMH of Epilepsy presented to ED for seizures. Unable to obtain history from patient (intubated/sedated)    Spoke with ER physicain, Dr. Pink who states that patient was BIBA and that he received versed 5mg at home by mother and another 5mg versed by EMS. He was actively seizing upon ED arrival. He was given 4 mg IV Ativan and intubated for Airway protection.  Neurology consulted and recommended stat EEG, Keppra 3g load and c/w home medications.    Per patient's mother, patient has complained for flu like symptoms (runny nose, congestion, fatigue) over the past day. She states that she witnessed the seizure which was like his usual seizure activity (left sided shaking movements with eye deviation to left). She states that she usually gives him Versed 5 mg and seizures resolve, however this time it did not and she called EMS. She states that he had seizures in the past when he had the flu. Last seizure in early spring. Up to date on immunizations including flu shot. compliant with all medications.       PMH  Epilepsy  Developmental delay    No past surgical history on file.    Review of patient's allergies indicates:  No Known Allergies    No current facility-administered medications on file prior to encounter.      Current Outpatient Medications on File Prior to Encounter   Medication Sig    clorazepate (TRANXENE) 3.75 MG Tab Take 7.5 mg by mouth 2 (two) times daily.     eslicarbazepine (APTIOM) 200 mg Tab Take 200 mg by mouth once daily.      eslicarbazepine (APTIOM) 800 mg Tab Take 800 mg by mouth every evening.     risperidone (RISPERDAL M-TABS) 1 MG TbDL Take 1 mg by mouth once daily.     topiramate (QUDEXY XR) 100 mg CSpX Take 200 mg by mouth once daily.     topiramate (QUDEXY XR) 100 mg CSpX Take 300 mg by mouth every evening.     Family History     Reviewed with patient's mother and non-contributory        Tobacco Use    Smoking status: never   Substance and Sexual Activity    Alcohol use: never    Drug use: never    Sexual activity: unknown   Social hx reviewed with patient's mother    Review of Systems   Unable to perform ROS: Intubated     Objective:     Vital Signs (Most Recent):  Temp: (!) 100.8 °F (38.2 °C) (12/23/19 2002)  Pulse: 98 (12/23/19 2032)  Resp: 19 (12/23/19 2003)  BP: 136/67 (12/23/19 2032)  SpO2: 98 % (12/23/19 2032) Vital Signs (24h Range):  Temp:  [100.8 °F (38.2 °C)-103.4 °F (39.7 °C)] 100.8 °F (38.2 °C)  Pulse:  [] 98  Resp:  [18-37] 19  SpO2:  [98 %-100 %] 98 %  BP: ()/(49-76) 136/67     Weight: 44.5 kg (98 lb)  Body mass index is 16.31 kg/m².    Physical Exam   Constitutional:   Intubated/sedated     HENT:   Head: Normocephalic and atraumatic.   Et tube   Eyes: Pupils are equal, round, and reactive to light. No scleral icterus.   Neck: Neck supple.   Cardiovascular: Normal rate, normal heart sounds and intact distal pulses.   tachy   Pulmonary/Chest: He has no wheezes. He has no rales.   Intubated  Mechanical breath sounds  No wheeze,rales,   Abdominal: Soft. Bowel sounds are normal. He exhibits no distension.   Genitourinary:   Genitourinary Comments: Rice    Musculoskeletal: He exhibits no edema.   No rigidity   Lymphadenopathy:     He has no cervical adenopathy.   Neurological:   unresposive   Skin: Skin is warm and dry. No rash noted. No erythema.   Psychiatric:   Unable to assess 2/2 acuity   Nursing note and vitals reviewed.       Significant Labs:   ABGs:   Recent Labs   Lab 12/23/19 1959    PH 7.373   PCO2 30.2*   HCO3 17.6*   POCSATURATED 100   BE -8     CBC:   Recent Labs   Lab 12/23/19 1914   WBC 9.83   HGB 14.8   HCT 42.6   *     CMP:   Recent Labs   Lab 12/23/19 1914      K 3.8   *   CO2 20*   GLU 99   BUN 10   CREATININE 1.0   CALCIUM 7.8*   PROT 6.2   ALBUMIN 4.0   BILITOT 0.6   ALKPHOS 78   AST 19   ALT 18   ANIONGAP 8   EGFRNONAA >60.0     TSH:   Recent Labs   Lab 12/23/19 1914   TSH 0.550     Urine Studies:   Recent Labs   Lab 12/23/19 1846   COLORU Yellow   APPEARANCEUA Clear   PHUR 6.0   SPECGRAV 1.020   PROTEINUA 1+*   GLUCUA Negative   KETONESU Negative   BILIRUBINUA Negative   OCCULTUA Trace*   NITRITE Negative   UROBILINOGEN Negative   LEUKOCYTESUR Negative   RBCUA 3   WBCUA 1   BACTERIA Negative   SQUAMEPITHEL 3   HYALINECASTS 16*     All pertinent labs within the past 24 hours have been reviewed.    Significant Imaging: I have reviewed all pertinent imaging results/findings within the past 24 hours.   EKG, personally reviewed, Sinus tach with no ST elevation        Imaging Results          CT Head Without Contrast (Final result)  Result time 12/23/19 19:42:38    Final result by Yanick Dickson MD (12/23/19 19:42:38)                 Impression:      No acute intracranial abnormality.      Electronically signed by: Yanick Dickson MD  Date:    12/23/2019  Time:    19:42             Narrative:      CMS MANDATED QUALITY DATA - CT RADIATION - 436    All CT scans at this facility utilize dose modulation, iterative reconstruction, and/or weight based dosing when appropriate to reduce radiation dose to as low as reasonably achievable.    EXAMINATION:  CT HEAD WITHOUT CONTRAST    CLINICAL HISTORY:  seizure,febrile;    TECHNIQUE:  Head CT without IV contrast.    COMPARISON:  02/08/2018    FINDINGS:  Gray-white differentiation is maintained without hemorrhage, midline shift, or mass effect.    The ventricles and cisterns are maintained.    Calvarium is intact. Visualized  sinuses are clear. Presumed endotracheal tube and enteric catheter partially visualized within the oral cavity.                               X-Ray Chest AP Portable (Final result)  Result time 12/23/19 18:56:32    Final result by Yanick Dickson MD (12/23/19 18:56:32)                 Impression:      1. Endotracheal tube and enteric catheter as above.  2. No acute cardiopulmonary abnormality.      Electronically signed by: Yanick Dickson MD  Date:    12/23/2019  Time:    18:56             Narrative:    EXAMINATION:  XR CHEST AP PORTABLE    CLINICAL HISTORY:  Encounter for other preprocedural examination placement of endotracheal tube and enteric catheter    FINDINGS:  Portable chest at 1832 compared with 02/08/2018 shows placement of endotracheal tube with tip 3.5 cm proximal to natalee.  Enteric catheter tip lies in stomach, although proximal side port lies approximately 6 cm cranial to estimated GE junction.    Cardiomediastinal silhouette is otherwise normal.  Stimulator device again projects over lateral left hemithorax.    Lungs are clear. Pulmonary vasculature is normal. No acute osseous abnormality.                               X-ray Abdomen for NG Tube Placement (Nursing should notify Radiology after placement) (Final result)  Result time 12/23/19 18:58:06    Final result by Yanick Dickson MD (12/23/19 18:58:06)                 Impression:      Proximal side port of enteric catheter lies 7 cm cranial to estimated GE junction.  If positioning of enteric catheter within stomach is desired, 7-10 cm of advancement are suggested.      Electronically signed by: Yanick Dickson MD  Date:    12/23/2019  Time:    18:58             Narrative:    EXAMINATION:  XR NON-RADIOLOGIST PERFORMED NG/GASTRIC TUBE CHECK    CLINICAL HISTORY:  OG tube placement;    FINDINGS:  Supine abdomen shows proximal side port of enteric catheter approximately 7 cm cranial to estimated GE junction.    Bowel gas pattern is nonobstructive without  intra-abdominal mass effect organomegaly.  Right pelvic phleboliths incidentally noted.  Visualized lung bases are clear.  No acute osseous abnormality.  Right femoral neck bone island present.                                  Assessment/Plan:     * Status epilepticus  Hx of epilepsy/seizure disorder  Followed by Ped Neurology at [facility]  Intubated in ED  pulmonary consulted   Admit to ICU  Neurology consulted in ED  Personally spoke with Dr. Arjun Pinzon who recommended keppra 3g load, EEG, resume home meds, close monitoring  Seizure precautions  EEG pending  Ativan prn anxiety or seizures  C/w propofol gtt  precedex if needed        Influenza B  Flu b positive in ED  Supportive care for now  tylenolol prn elevated temp  No tamiflu as it may lower seizure threshold   IV bolus 1L LR will continue IVF  Patient received IVabx in ED will continue for now  Blood, urine cultures pending          VTE Risk Mitigation (From admission, onward)         Ordered     IP VTE LOW RISK PATIENT  Once      12/23/19 2040                   Sia March DO  Department of Hospital Medicine   [facility]

## 2019-12-24 NOTE — ASSESSMENT & PLAN NOTE
Flu b positive in ED  Supportive care for now  tylenolol prn elevated temp  No tamiflu as it may lower seizure threshold   IV bolus 1L LR will continue IVF  Patient received IVabx in ED will continue for now  Blood, urine cultures pending

## 2019-12-25 PROBLEM — G40.901 STATUS EPILEPTICUS: Status: RESOLVED | Noted: 2019-12-23 | Resolved: 2019-12-25

## 2019-12-25 PROBLEM — E87.6 HYPOKALEMIA: Status: RESOLVED | Noted: 2019-12-24 | Resolved: 2019-12-25

## 2019-12-25 LAB
ALBUMIN SERPL BCP-MCNC: 3.6 G/DL (ref 3.5–5.2)
ALP SERPL-CCNC: 61 U/L (ref 55–135)
ALT SERPL W/O P-5'-P-CCNC: 25 U/L (ref 10–44)
ANION GAP SERPL CALC-SCNC: 8 MMOL/L (ref 8–16)
AST SERPL-CCNC: 76 U/L (ref 10–40)
BACTERIA SPEC AEROBE CULT: NORMAL
BASOPHILS # BLD AUTO: 0.04 K/UL (ref 0–0.2)
BASOPHILS NFR BLD: 0.3 % (ref 0–1.9)
BILIRUB SERPL-MCNC: 1.1 MG/DL (ref 0.1–1)
BUN SERPL-MCNC: 10 MG/DL (ref 6–20)
CALCIUM SERPL-MCNC: 8.5 MG/DL (ref 8.7–10.5)
CHLORIDE SERPL-SCNC: 108 MMOL/L (ref 95–110)
CO2 SERPL-SCNC: 24 MMOL/L (ref 23–29)
CREAT SERPL-MCNC: 0.9 MG/DL (ref 0.5–1.4)
DIFFERENTIAL METHOD: ABNORMAL
EOSINOPHIL # BLD AUTO: 0 K/UL (ref 0–0.5)
EOSINOPHIL NFR BLD: 0.1 % (ref 0–8)
ERYTHROCYTE [DISTWIDTH] IN BLOOD BY AUTOMATED COUNT: 11.4 % (ref 11.5–14.5)
EST. GFR  (AFRICAN AMERICAN): >60 ML/MIN/1.73 M^2
EST. GFR  (NON AFRICAN AMERICAN): >60 ML/MIN/1.73 M^2
ESTIMATED AVG GLUCOSE: 85 MG/DL (ref 68–131)
GLUCOSE SERPL-MCNC: 86 MG/DL (ref 70–110)
GRAM STN SPEC: NORMAL
HBA1C MFR BLD HPLC: 4.6 % (ref 4.5–6.2)
HCT VFR BLD AUTO: 46.8 % (ref 40–54)
HGB BLD-MCNC: 15.7 G/DL (ref 14–18)
IMM GRANULOCYTES # BLD AUTO: 0.07 K/UL (ref 0–0.04)
IMM GRANULOCYTES NFR BLD AUTO: 0.5 % (ref 0–0.5)
LYMPHOCYTES # BLD AUTO: 2.2 K/UL (ref 1–4.8)
LYMPHOCYTES NFR BLD: 14.4 % (ref 18–48)
MAGNESIUM SERPL-MCNC: 1.9 MG/DL (ref 1.6–2.6)
MCH RBC QN AUTO: 31.8 PG (ref 27–31)
MCHC RBC AUTO-ENTMCNC: 33.5 G/DL (ref 32–36)
MCV RBC AUTO: 95 FL (ref 82–98)
MONOCYTES # BLD AUTO: 1.7 K/UL (ref 0.3–1)
MONOCYTES NFR BLD: 11.2 % (ref 4–15)
NEUTROPHILS # BLD AUTO: 11.3 K/UL (ref 1.8–7.7)
NEUTROPHILS NFR BLD: 73.5 % (ref 38–73)
NRBC BLD-RTO: 0 /100 WBC
PHOSPHATE SERPL-MCNC: 2.9 MG/DL (ref 2.7–4.5)
PLATELET # BLD AUTO: 81 K/UL (ref 150–350)
PMV BLD AUTO: 11.1 FL (ref 9.2–12.9)
POTASSIUM SERPL-SCNC: 4.5 MMOL/L (ref 3.5–5.1)
PROT SERPL-MCNC: 6.6 G/DL (ref 6–8.4)
RBC # BLD AUTO: 4.93 M/UL (ref 4.6–6.2)
RPR SER QL: NORMAL
SODIUM SERPL-SCNC: 140 MMOL/L (ref 136–145)
WBC # BLD AUTO: 15.37 K/UL (ref 3.9–12.7)

## 2019-12-25 PROCEDURE — 84100 ASSAY OF PHOSPHORUS: CPT

## 2019-12-25 PROCEDURE — C9113 INJ PANTOPRAZOLE SODIUM, VIA: HCPCS | Performed by: STUDENT IN AN ORGANIZED HEALTH CARE EDUCATION/TRAINING PROGRAM

## 2019-12-25 PROCEDURE — 85025 COMPLETE CBC W/AUTO DIFF WBC: CPT

## 2019-12-25 PROCEDURE — 12000002 HC ACUTE/MED SURGE SEMI-PRIVATE ROOM

## 2019-12-25 PROCEDURE — 63600175 PHARM REV CODE 636 W HCPCS: Performed by: STUDENT IN AN ORGANIZED HEALTH CARE EDUCATION/TRAINING PROGRAM

## 2019-12-25 PROCEDURE — 84207 ASSAY OF VITAMIN B-6: CPT

## 2019-12-25 PROCEDURE — 94761 N-INVAS EAR/PLS OXIMETRY MLT: CPT

## 2019-12-25 PROCEDURE — 25000003 PHARM REV CODE 250

## 2019-12-25 PROCEDURE — 25000003 PHARM REV CODE 250: Performed by: NURSE PRACTITIONER

## 2019-12-25 PROCEDURE — 80053 COMPREHEN METABOLIC PANEL: CPT

## 2019-12-25 PROCEDURE — 27000221 HC OXYGEN, UP TO 24 HOURS

## 2019-12-25 PROCEDURE — 99900035 HC TECH TIME PER 15 MIN (STAT)

## 2019-12-25 PROCEDURE — 83735 ASSAY OF MAGNESIUM: CPT

## 2019-12-25 PROCEDURE — 25000003 PHARM REV CODE 250: Performed by: STUDENT IN AN ORGANIZED HEALTH CARE EDUCATION/TRAINING PROGRAM

## 2019-12-25 PROCEDURE — 20000000 HC ICU ROOM

## 2019-12-25 PROCEDURE — 36415 COLL VENOUS BLD VENIPUNCTURE: CPT

## 2019-12-25 RX ORDER — LEVETIRACETAM 250 MG/1
250 TABLET ORAL 2 TIMES DAILY
Status: DISCONTINUED | OUTPATIENT
Start: 2019-12-25 | End: 2019-12-27 | Stop reason: HOSPADM

## 2019-12-25 RX ORDER — LEVETIRACETAM 250 MG/1
250 TABLET ORAL 2 TIMES DAILY
Status: DISCONTINUED | OUTPATIENT
Start: 2019-12-25 | End: 2019-12-25

## 2019-12-25 RX ORDER — LEVETIRACETAM 500 MG/1
500 TABLET ORAL 2 TIMES DAILY
Status: DISCONTINUED | OUTPATIENT
Start: 2019-12-25 | End: 2019-12-25

## 2019-12-25 RX ADMIN — CLORAZEPATE DIPOTASSIUM 7.5 MG: 7.5 TABLET ORAL at 09:12

## 2019-12-25 RX ADMIN — TOPIRAMATE 200 MG: 100 CAPSULE, EXTENDED RELEASE ORAL at 09:12

## 2019-12-25 RX ADMIN — LEVETIRACETAM 250 MG: 250 TABLET, FILM COATED ORAL at 08:12

## 2019-12-25 RX ADMIN — CEFTRIAXONE 2 G: 2 INJECTION, SOLUTION INTRAVENOUS at 07:12

## 2019-12-25 RX ADMIN — CHLORHEXIDINE GLUCONATE 15 ML: 1.2 RINSE ORAL at 08:12

## 2019-12-25 RX ADMIN — PANTOPRAZOLE SODIUM 40 MG: 40 INJECTION, POWDER, FOR SOLUTION INTRAVENOUS at 09:12

## 2019-12-25 RX ADMIN — MUPIROCIN: 20 OINTMENT TOPICAL at 11:12

## 2019-12-25 RX ADMIN — CLORAZEPATE DIPOTASSIUM 7.5 MG: 7.5 TABLET ORAL at 08:12

## 2019-12-25 RX ADMIN — MUPIROCIN: 20 OINTMENT TOPICAL at 09:12

## 2019-12-25 RX ADMIN — RISPERIDONE 1 MG: 1 TABLET ORAL at 07:12

## 2019-12-25 RX ADMIN — CHLORHEXIDINE GLUCONATE 15 ML: 1.2 RINSE ORAL at 09:12

## 2019-12-25 RX ADMIN — TOPIRAMATE 300 MG: 100 CAPSULE, EXTENDED RELEASE ORAL at 08:12

## 2019-12-25 RX ADMIN — LEVETIRACETAM 250 MG: 250 TABLET, FILM COATED ORAL at 11:12

## 2019-12-25 NOTE — ASSESSMENT & PLAN NOTE
· Extubated to room air and stable.  · Seizures have resolved.  · Defer to ID re: influenza.  · Continue AEDs at the direction of neurology.  · Laccate due to seizures and shivering with cooling blanket.  · Scheduled antipyretics.  · Replace K.

## 2019-12-25 NOTE — PROGRESS NOTES
[facility]  Neurology  Progress Note    Patient Name: Gonsalo Mei III  MRN: [id number]  Admission Date: 12/23/2019  Hospital Length of Stay: 2 days  Code Status: Full Code   Attending Provider: Jamaal Mosley MD   Consulting Provider: Arjun Pnizon  Consulting NP Tammy Hyde NP  Primary Care Physician: Alex Charles MD  Principal Problem:Status epilepticus      Subjective:     Chief Complaint:    Chief Complaint   Patient presents with    Seizures          HPI: 23 yo AAM with PMH of Epilepsy presented to ED for seizures. Unable to obtain history from patient (intubated/sedated)     Spoke with ER physicain, Dr. Pink who states that patient was BIBA and that he received versed 5mg at home by mother and another 5mg versed by EMS. He was actively seizing upon ED arrival. He was given 4 mg IV Ativan and intubated for Airway protection.  Neurology consulted and recommended stat EEG, Keppra 3g load and c/w home medications.    Per patient's mother, patient has complained for flu like symptoms (runny nose, congestion, fatigue) over the past day. She states that she witnessed the seizure which was like his usual seizure activity (left sided shaking movements with eye deviation to left). She states that she usually gives him Versed 5 mg and seizures resolve, however this time it did not and she called EMS. She states that he had seizures in the past when he had the flu. Last seizure in early spring. Up to date on immunizations including flu shot. compliant with all medications.     Neurological interval note: Patient seen and examined. Mother is at bedside. Patient has been extubated. He is more  awake and alert. He is able to answer simple questions. He knows today is Yoon. He states he feels better. His mother states he is not quite at his baseline; but is getting close. He has not had any more seizure activity. Patient's mother states he does not normally take Keppra at home. Tapering off at  250mg Keppra BID x 3 days. Will increase eslicarbazepine to 400mg in AM. Oxcarbazepine, levetiracetam, and topiramte levels still pending. EEG from yesterday showed mild to moderate encephalopathy, no seizures. Will continue to monitor the patient.     Past Medical History:   Diagnosis Date    Cerebellar atrophy     by MRI    Epilepsy        Past Surgical History:   Procedure Laterality Date    TONSILLECTOMY      vagal nerve stimulator (2004) Left 2004       Review of patient's allergies indicates:   Allergen Reactions    Tamiflu [oseltamivir] Other (See Comments)     seizures       Current Neurological Medications:  No current facility-administered medications on file prior to encounter.      Current Outpatient Medications on File Prior to Encounter   Medication Sig Dispense Refill    clorazepate (TRANXENE) 3.75 MG Tab Take 7.5 mg by mouth 2 (two) times daily.       eslicarbazepine (APTIOM) 200 mg Tab Take 200 mg by mouth once daily.       eslicarbazepine (APTIOM) 800 mg Tab Take 800 mg by mouth every evening.       risperidone (RISPERDAL M-TABS) 1 MG TbDL Take 1 mg by mouth once daily.       topiramate (QUDEXY XR) 100 mg CSpX Take 200 mg by mouth once daily.       topiramate (QUDEXY XR) 100 mg CSpX Take 300 mg by mouth every evening.      diazepam (DIASTAT) 2.5 mg Kit Place 20 mg rectally.       lacosamide (VIMPAT) 200 mg Tab Take by mouth 2 (two) times daily.      lamotrigine (LAMICTAL) 100 MG tablet Take 300 mg by mouth once daily.       lisdexamfetamine (VYVANSE) 30 MG capsule Take 30 mg by mouth every morning.      phenytoin (DILANTIN) 50 mg chewable tablet Take 50 mg by mouth before breakfast.      phenytoin (DILANTIN) 50 mg chewable tablet Take 75 mg by mouth every evening.         No current facility-administered medications on file prior to encounter.      Current Outpatient Medications on File Prior to Encounter   Medication Sig    clorazepate (TRANXENE) 3.75 MG Tab Take 7.5 mg by mouth  2 (two) times daily.     eslicarbazepine (APTIOM) 200 mg Tab Take 200 mg by mouth once daily.     eslicarbazepine (APTIOM) 800 mg Tab Take 800 mg by mouth every evening.     risperidone (RISPERDAL M-TABS) 1 MG TbDL Take 1 mg by mouth once daily.     topiramate (QUDEXY XR) 100 mg CSpX Take 200 mg by mouth once daily.     topiramate (QUDEXY XR) 100 mg CSpX Take 300 mg by mouth every evening.    diazepam (DIASTAT) 2.5 mg Kit Place 20 mg rectally.     lacosamide (VIMPAT) 200 mg Tab Take by mouth 2 (two) times daily.    lamotrigine (LAMICTAL) 100 MG tablet Take 300 mg by mouth once daily.     lisdexamfetamine (VYVANSE) 30 MG capsule Take 30 mg by mouth every morning.    phenytoin (DILANTIN) 50 mg chewable tablet Take 50 mg by mouth before breakfast.    phenytoin (DILANTIN) 50 mg chewable tablet Take 75 mg by mouth every evening.      Family History     Problem Relation (Age of Onset)    Asthma Mother    Cancer Maternal Grandmother    Diabetes Paternal Aunt, Maternal Grandmother    Early death Maternal Grandmother    Heart disease Paternal Aunt, Maternal Grandmother, Maternal Grandfather    Hypertension Paternal Aunt, Maternal Grandmother, Maternal Grandfather    Stroke Maternal Grandmother        Tobacco Use    Smoking status: Never Smoker   Substance and Sexual Activity    Alcohol use: Never     Frequency: Never    Drug use: Never    Sexual activity: Never     Comment: per mom     Review of Systems   Constitutional: Positive for fatigue and fever. Negative for activity change, appetite change, chills, diaphoresis and unexpected weight change.   HENT: Negative.    Respiratory: Negative.    Neurological: Positive for seizures, speech difficulty (some expressive aphasia; chronic in nature) and weakness (generalized). Negative for dizziness, tremors, syncope, facial asymmetry, light-headedness, numbness and headaches.     Objective:     Vital Signs (Most Recent):  Temp: 97.9 °F (36.6 °C) (12/25/19  0300)  Pulse: (!) 59 (12/25/19 0600)  Resp: (!) 26 (12/25/19 0600)  BP: (!) 115/57 (12/25/19 0600)  SpO2: 98 % (12/25/19 0600) Vital Signs (24h Range):  Temp:  [97.9 °F (36.6 °C)-103.1 °F (39.5 °C)] 97.9 °F (36.6 °C)  Pulse:  [] 59  Resp:  [16-37] 26  SpO2:  [79 %-100 %] 98 %  BP: ()/(57-96) 115/57     Weight: 49.7 kg (109 lb 9.1 oz)  Body mass index is 18.23 kg/m².    Physical Exam   Constitutional: He is oriented to person, place, and time. He appears well-developed and well-nourished.   HENT:   Head: Normocephalic and atraumatic.   Neck: Normal range of motion. Neck supple.   Cardiovascular: Normal rate.   Pulmonary/Chest: Effort normal.   Abdominal: Soft.   Musculoskeletal: Normal range of motion.   Neurological: He is alert and oriented to person, place, and time. He displays normal reflexes. No cranial nerve deficit or sensory deficit. He exhibits normal muscle tone. He has an abnormal Finger-Nose-Finger Test. Coordination normal.   Reflex Scores:       Tricep reflexes are 2+ on the right side and 2+ on the left side.       Bicep reflexes are 2+ on the right side and 2+ on the left side.       Brachioradialis reflexes are 2+ on the right side and 2+ on the left side.       Patellar reflexes are 2+ on the right side and 2+ on the left side.       Achilles reflexes are 2+ on the right side and 2+ on the left side.  Skin: Skin is warm and dry. Capillary refill takes less than 2 seconds.   Psychiatric: He has a normal mood and affect.       NEUROLOGICAL EXAMINATION:     MENTAL STATUS   Oriented to person, place, and time.   Level of consciousness: alert    CRANIAL NERVES   Cranial nerves II through XII intact.     MOTOR EXAM     Strength   Right neck flexion: 4/5  Left neck flexion: 4/5  Right neck extension: 4/5  Left neck extension: 4/5  Right deltoid: 4/5  Left deltoid: 4/5  Right biceps: 4/5  Left biceps: 4/5  Right triceps: 4/5  Left triceps: 4/5  Right wrist flexion: 4/5  Left wrist flexion:  4/5  Right wrist extension: 4/5  Left wrist extension: 4/5  Right interossei: 4/5  Left interossei: 4/5  Right abdominals: 4/5  Left abdominals: 4/5  Right iliopsoas: 4/5  Left iliopsoas: 4/5  Right quadriceps: 4/5  Left quadriceps: 4/5  Right hamstrin/5  Left hamstrin/5  Right glutei: 4/5  Left glutei: 4/5  Right anterior tibial: 4/5  Left anterior tibial: 4/5  Right posterior tibial: 4/5  Left posterior tibial: 4/5  Right peroneal: 4/5  Left peroneal: 4/5  Right gastroc: 4/5  Left gastroc: 4/5    REFLEXES     Reflexes   Right brachioradialis: 2+  Left brachioradialis: 2+  Right biceps: 2+  Left biceps: 2+  Right triceps: 2+  Left triceps: 2+  Right patellar: 2+  Left patellar: 2+  Right achilles: 2+  Left achilles: 2+  Right : 2+  Left : 2+    SENSORY EXAM   Light touch normal.     GAIT AND COORDINATION      Coordination   Finger to nose coordination: abnormal        Significant Labs:   BMP:   Recent Labs   Lab 19  1914 19  0211 19  0454   GLU 99 121* 86    140 140   K 3.8 3.0* 4.5   * 112* 108   CO2 20* 18* 24   BUN 10 9 10   CREATININE 1.0 0.9 0.9   CALCIUM 7.8* 8.4* 8.5*   MG  --  1.4* 1.9     Lab Results   Component Value Date    TSH 0.550 2019       Significant Imaging:  CT of Head w/o contrast  Impression       No acute intracranial abnormality.     EEG   IMPRESSION: Mild to moderate encephalopathy. No Seizures.        Assessment and Plan:    1.Status Epilepticus  -EEG showed mild to moderate encephalopathy. No Seizures  -Patient is close to his baseline per mom  -Continue home AEDs-will taper Keppra to 250mg BID x 3 days and then d/c; f/u with his neurologist  -Seizure precautions  -Check AED levels Topiramate level oxcarbazepine level, Keppra level-pending    2. Encephalopathy    - Encephalopathy labs,  -EEG showed mild to moderate encephalopathy. No Seizures      3.History of seizures  -Continue Home AED meds-tapering off Keppra PO; does not  normally take Keppra at home  -Seizures precautions     4.Fever  -Managed per IM     5. Influenza Type B Positive   -Managed per IM     Patient to follow up with [facility] at [phone] within 2 weeks from discharge.                     Active Diagnoses:    Diagnosis Date Noted POA    PRINCIPAL PROBLEM:  Status epilepticus [G40.901] 12/23/2019 Yes    Fever [R50.9] 12/24/2019 Yes    Hypokalemia [E87.6] 12/24/2019 Yes    Lactic acidosis [E87.2] 12/24/2019 Yes    Influenza B [J10.1] 12/23/2019 Yes      Problems Resolved During this Admission:       VTE Risk Mitigation (From admission, onward)         Ordered     IP VTE LOW RISK PATIENT  Once      12/23/19 2040              Patient was seen and examined by Dr. Arjun Pinzon.    Thank you for your consult. I will follow-up with patient. Please contact us if you have any additional questions.    Tammy Hyde, NP  Neurology  [facility]    I, Dr. Arjun Pinzon, have personally seen and examined the patient with my advanced provider and agree with above. I personally did a focused exam, and reviewed all necessary clinical information. I discussed my management plan with my NP and agree with above. At baseline. Wean off Keppra.

## 2019-12-25 NOTE — PROGRESS NOTES
Consult Note  Infectious Disease    Reason for Consult:  fever    HPI: Gonsalo Mei III is a healthy appearing 24 y.o. male with a history of epilepsy since childhood who was in his usual state of health on 12/22, but yesterday was on the couch all day, less talkative, eating normally who abruptly began to have seizures. His family gave his intranasal Versed without resolution and he was brought to the ED. In the ED he had a 103 degree temperature and was found to be Influenza B positive. He had persistent seizure activity despite 4 mg of Ativan and he was intubated, given a large load of Keppra and admitted to the ICU. He did receive the flu vaccine in October, but does spend time in day community with other adults with disabilities. He did have a runny nose and mild cough prior to presentation. Family denies that he complained of headache, diarrhea, rash, shortness of breath. No other family members are ill. He has a history of lowered seizure threshold with Tamiflu and this was not prescribed. Infections have triggered seizures in the past.     12/25: tmax 103 , tylenol has been prn since yesterday afternoon after extubation. cxr remains clear. Mother at bedside and acknowledges that he is very near baseline. He had pudding. He states his abdomen is sore, but not GI pain. No diarrhea. Breathing comfortably on RA.     EXAM & DIAGNOSTICS REVIEWED:   Vitals:     Temp:  [97.9 °F (36.6 °C)-103.1 °F (39.5 °C)]   Temp: 97.9 °F (36.6 °C) (12/25/19 0300)  Pulse: (!) 59 (12/25/19 0600)  Resp: (!) 26 (12/25/19 0600)  BP: (!) 115/57 (12/25/19 0600)  SpO2: 98 % (12/25/19 0600)    Intake/Output Summary (Last 24 hours) at 12/25/2019 0925  Last data filed at 12/25/2019 0500  Gross per 24 hour   Intake 1641.67 ml   Output 1090 ml   Net 551.67 ml       General:   alert, an dcooperative  Eyes:  Anicteric, PERRL, EOMi  ENT:  No ulcers, exudates, thrush, nares patent, dentition is good  Neck:  supple,    Lungs: Clear, no  consolidation, rales, wheezes, rub  Heart:  RRR, no gallop/murmur/rub noted  Abd:  Soft, NT, ND, normal BS, no masses or organomegaly appreciated. Orogastric tube in place  :   Rice, urine clear, no flank tenderness  Musc:  Joints without effusion, swelling, erythema, synovitis, muscle wasting.   Skin:  No rashes. No palmar or plantar lesions. No subungual petechiae  Wound:   Neuro:  alert, attends, interacts, speech is slow but fairly clear(speech impediment), moves all extremities  Psych:  Smiling, pleasant, cooperative  Lymphatic:     No cervical, supraclavicular, axillary, or inguinal nodes  Extrem: No edema, erythema, phlebitis, cellulitis, warm and well perfused  VAD:  peripherals   Isolation:  droplet    Lines/Tubes/Drains:    General Labs reviewed:  Recent Labs   Lab 12/23/19 2120 12/24/19 0211 12/24/19  0422 12/25/19  0454   WBC 13.44* 11.33  --  15.37*   HGB 15.8 14.2  --  15.7   HCT 47.2 40.7 41 46.8   * 103*  --  81*       Recent Labs   Lab 12/23/19  1914 12/24/19  0211 12/25/19  0454    140 140   K 3.8 3.0* 4.5   * 112* 108   CO2 20* 18* 24   BUN 10 9 10   CREATININE 1.0 0.9 0.9   CALCIUM 7.8* 8.4* 8.5*   PROT 6.2 6.1 6.6   BILITOT 0.6 0.7 1.1*   ALKPHOS 78 68 61   ALT 18 17 25   AST 19 20 76*     procal 0.06  Influenza B positive    Micro:  Microbiology Results (last 7 days)     Procedure Component Value Units Date/Time    Culture, Respiratory with Gram Stain [[id number]] Collected:  12/23/19 2010    Order Status:  Completed Specimen:  Respiratory from Tracheal Aspirate Updated:  12/25/19 0651     Respiratory Culture Normal respiratory juan     Gram Stain (Respiratory) Moderate WBC's     Gram Stain (Respiratory) Moderate Gram positive cocci     Gram Stain (Respiratory) Few Gram positive rods     Gram Stain (Respiratory) Few Gram negative coccobacilli    Urine culture High Risk **CANNOT BE ORDERED STAT** [[id number]] Collected:  12/23/19 1846    Order Status:  Completed Specimen:   Urine, Catheterized Updated:  12/25/19 0631     Urine Culture, Routine No growth to date    Narrative:       Indicated criteria for high risk culture:->Other  Other (specify):->intubated, febrile    Blood culture #2 **CANNOT BE ORDERED STAT** [[id number]] Collected:  12/23/19 1958    Order Status:  Completed Specimen:  Blood from Peripheral, Hand, Right Updated:  12/24/19 2232     Blood Culture, Routine No Growth to date      No Growth to date    Blood culture #1 **CANNOT BE ORDERED STAT** [[id number]] Collected:  12/23/19 1945    Order Status:  Completed Specimen:  Blood from Peripheral, Forearm, Right Updated:  12/24/19 2232     Blood Culture, Routine No Growth to date      No Growth to date        Imaging Reviewed:   CXR clear   CT head negative    Cardiology:    IMPRESSION & PLAN   1. Status epilepticus, epilepsy since age 3  2. Influenza B with high fever and lactic acidosis  3. Developmentally delayed      Recommendations:   stop rocephin  Out of bed  Scheduled tylenol to blunt fever  Droplet isolation until he has had no fever for 24h

## 2019-12-25 NOTE — PLAN OF CARE
Increased communication today spoke with familly on phone simple sentences. Out of bed with little assist to chair. No seizure activity today

## 2019-12-25 NOTE — PROGRESS NOTES
[facility], later [facility]  Progress Note    Patient Name: Gonsalo Mei III  MRN: [id number]  Patient Class: IP- Inpatient   Admission Date: 12/23/2019  Length of Stay: 2 days  Attending Physician: Jamaal Mosley MD  Primary Care Provider: Alex Charles MD        Subjective:     Principal Problem:Status epilepticus        HPI:  25 yo AAM with PMH of Epilepsy presented to ED for seizures. Unable to obtain history from patient (intubated/sedated)    Spoke with ER physicain, Dr. Pink who states that patient was BIBA and that he received versed 5mg at home by mother and another 5mg versed by EMS. He was actively seizing upon ED arrival. He was given 4 mg IV Ativan and intubated for Airway protection.  Neurology consulted and recommended stat EEG, Keppra 3g load and c/w home medications.    Per patient's mother, patient has complained for flu like symptoms (runny nose, congestion, fatigue) over the past day. She states that she witnessed the seizure which was like his usual seizure activity (left sided shaking movements with eye deviation to left). She states that she usually gives him Versed 5 mg and seizures resolve, however this time it did not and she called EMS. She states that he had seizures in the past when he had the flu. Last seizure in early spring. Up to date on immunizations including flu shot. compliant with all medications.       Overview/Hospital Course:  12/24 Remains sedated and intubated, though propofol is currently off for EEG.  No seizure activity on EEG as per Dr. Arjun Pinzon. ID attendance noted and appreciated. Tmax 103 F. Labs personally reviewed: leukocytosis has resolved.Has developed hypokalemia (will be covered). Lactate = 3.8. Telemetry personally reviewed = sinus  12/25 Extubated. Mother and caregiver state patient is back to baseline. Has been apyrexic since yesterday. Patient denies pain. Is hungry. Will watch in ICU through-out day. If remains apyrexic and no  further seizure, will transfer out of ICU and hopefully discharge in AM if no further fever/seizure. Labs personally reviewed: leukocytosis persisting; hypokalemia resolved. Telemetry personally reviewed: sinus    Interval History: stablizing    Review of Systems   Constitutional: Negative.    HENT: Negative.    Eyes: Negative.    Respiratory: Negative.    Cardiovascular: Negative.    Gastrointestinal: Negative.    Endocrine: Negative.    Genitourinary: Negative.    Musculoskeletal: Negative.    Skin: Negative.    Allergic/Immunologic: Negative.    Neurological: Negative.    Hematological: Negative.    All other systems reviewed and are negative.    Objective:     Vital Signs (Most Recent):  Temp: 99.1 °F (37.3 °C) (12/25/19 0701)  Pulse: 88 (12/25/19 0900)  Resp: 20 (12/25/19 0900)  BP: (!) 118/57 (12/25/19 0900)  SpO2: 99 % (12/25/19 0900) Vital Signs (24h Range):  Temp:  [97.9 °F (36.6 °C)-103.1 °F (39.5 °C)] 99.1 °F (37.3 °C)  Pulse:  [] 88  Resp:  [16-37] 20  SpO2:  [79 %-100 %] 99 %  BP: ()/(57-92) 118/57     Weight: 49.7 kg (109 lb 9.1 oz)  Body mass index is 18.23 kg/m².    Intake/Output Summary (Last 24 hours) at 12/25/2019 1052  Last data filed at 12/25/2019 0801  Gross per 24 hour   Intake 1966.67 ml   Output 1255 ml   Net 711.67 ml      Physical Exam   Constitutional: He is oriented to person, place, and time. He appears well-developed and well-nourished.   HENT:   Head: Normocephalic and atraumatic.   Eyes: Pupils are equal, round, and reactive to light. Conjunctivae and EOM are normal.   Neck: Normal range of motion. Neck supple.   Cardiovascular: Normal rate, regular rhythm, normal heart sounds and intact distal pulses.   Pulmonary/Chest: Effort normal and breath sounds normal.   Abdominal: Soft. Bowel sounds are normal.   Musculoskeletal: Normal range of motion.   Neurological: He is alert and oriented to person, place, and time.   Skin: Skin is warm and dry. Capillary refill takes less  than 2 seconds.   Psychiatric: He has a normal mood and affect. His behavior is normal. Judgment and thought content normal.   Nursing note and vitals reviewed.      Significant Labs:   BMP:   Recent Labs   Lab 12/25/19  0454   GLU 86      K 4.5      CO2 24   BUN 10   CREATININE 0.9   CALCIUM 8.5*   MG 1.9     CBC:   Recent Labs   Lab 12/23/19  2120 12/24/19  0211 12/24/19  0422 12/25/19  0454   WBC 13.44* 11.33  --  15.37*   HGB 15.8 14.2  --  15.7   HCT 47.2 40.7 41 46.8   * 103*  --  81*       Significant Imaging: I have reviewed and interpreted all pertinent imaging results/findings within the past 24 hours.      Assessment/Plan:      Fever  Continue current regimen      Influenza B  Per ID recommendations          VTE Risk Mitigation (From admission, onward)         Ordered     IP VTE LOW RISK PATIENT  Once      12/23/19 2040                      Jamaal Mosley MD  Department of Hospital Medicine   [facility]

## 2019-12-25 NOTE — SUBJECTIVE & OBJECTIVE
Interval History: stablizing    Review of Systems   Constitutional: Negative.    HENT: Negative.    Eyes: Negative.    Respiratory: Negative.    Cardiovascular: Negative.    Gastrointestinal: Negative.    Endocrine: Negative.    Genitourinary: Negative.    Musculoskeletal: Negative.    Skin: Negative.    Allergic/Immunologic: Negative.    Neurological: Negative.    Hematological: Negative.    All other systems reviewed and are negative.    Objective:     Vital Signs (Most Recent):  Temp: 99.1 °F (37.3 °C) (12/25/19 0701)  Pulse: 88 (12/25/19 0900)  Resp: 20 (12/25/19 0900)  BP: (!) 118/57 (12/25/19 0900)  SpO2: 99 % (12/25/19 0900) Vital Signs (24h Range):  Temp:  [97.9 °F (36.6 °C)-103.1 °F (39.5 °C)] 99.1 °F (37.3 °C)  Pulse:  [] 88  Resp:  [16-37] 20  SpO2:  [79 %-100 %] 99 %  BP: ()/(57-92) 118/57     Weight: 49.7 kg (109 lb 9.1 oz)  Body mass index is 18.23 kg/m².    Intake/Output Summary (Last 24 hours) at 12/25/2019 1052  Last data filed at 12/25/2019 0801  Gross per 24 hour   Intake 1966.67 ml   Output 1255 ml   Net 711.67 ml      Physical Exam   Constitutional: He is oriented to person, place, and time. He appears well-developed and well-nourished.   HENT:   Head: Normocephalic and atraumatic.   Eyes: Pupils are equal, round, and reactive to light. Conjunctivae and EOM are normal.   Neck: Normal range of motion. Neck supple.   Cardiovascular: Normal rate, regular rhythm, normal heart sounds and intact distal pulses.   Pulmonary/Chest: Effort normal and breath sounds normal.   Abdominal: Soft. Bowel sounds are normal.   Musculoskeletal: Normal range of motion.   Neurological: He is alert and oriented to person, place, and time.   Skin: Skin is warm and dry. Capillary refill takes less than 2 seconds.   Psychiatric: He has a normal mood and affect. His behavior is normal. Judgment and thought content normal.   Nursing note and vitals reviewed.      Significant Labs:   BMP:   Recent Labs   Lab  12/25/19  0454   GLU 86      K 4.5      CO2 24   BUN 10   CREATININE 0.9   CALCIUM 8.5*   MG 1.9     CBC:   Recent Labs   Lab 12/23/19 2120 12/24/19 0211 12/24/19 0422 12/25/19 0454   WBC 13.44* 11.33  --  15.37*   HGB 15.8 14.2  --  15.7   HCT 47.2 40.7 41 46.8   * 103*  --  81*       Significant Imaging: I have reviewed and interpreted all pertinent imaging results/findings within the past 24 hours.

## 2019-12-25 NOTE — HPI
Mr. Mei is a 24 year old gentleman with a history of seizure disorder and developmental delay who was in his USOH until 2 days ago when he devleoped a FLI.  His symptoms gradually became more severe since and yesterday evening he had a seizure which did not resolve after receiving intranasal versed.  He was brought to the ED, still seizing, and after reciving several more doses of versed his seizures resolved but he had to be intubated for airway protection.

## 2019-12-25 NOTE — CONSULTS
[facility]  Pulmonology   Consult Note    Inpatient consult to Pulmonology  Consult performed by: Ben Velazquez MD  Consult ordered by: Sia March DO         Subjective     Chief Complaint   Patient presents with    Seizures      History of Present Illness:  Mr. Mei is a 24 year old gentleman with a history of seizure disorder and developmental delay who was in his USOH until 2 days ago when he devleoped a FLI.  His symptoms gradually became more severe since and yesterday evening he had a seizure which did not resolve after receiving intranasal versed.  He was brought to the ED, still seizing, and after reciving several more doses of versed his seizures resolved but he had to be intubated for airway protection.   Past Medical History:   Diagnosis Date    Cerebellar atrophy     by MRI    Epilepsy      Past Surgical History:   Procedure Laterality Date    TONSILLECTOMY      vagal nerve stimulator (2004) Left 2004     Family History   Problem Relation Age of Onset    Asthma Mother     Diabetes Paternal Aunt     Heart disease Paternal Aunt     Hypertension Paternal Aunt     Diabetes Maternal Grandmother     Early death Maternal Grandmother     Heart disease Maternal Grandmother     Hypertension Maternal Grandmother     Stroke Maternal Grandmother     Cancer Maternal Grandmother     Heart disease Maternal Grandfather     Hypertension Maternal Grandfather       Social History     Tobacco Use    Smoking status: Never Smoker   Substance and Sexual Activity    Alcohol use: Never     Frequency: Never    Drug use: Never    Sexual activity: Never     Comment: per mom      Allergies:  Tamiflu [oseltamivir]     Facility-Administered Medications as of 12/24/2019   Medication Route Frequency    [COMPLETED] acetaminophen suppository 650 mg Rectal ED 1 Time    [COMPLETED] acetaminophen suppository 650 mg Rectal Once    acetaminophen suppository 650 mg Rectal Q6H PRN    acetaminophen  tablet 650 mg Per OG tube Q6H PRN    [COMPLETED] baloxavir marboxil Tab 40 mg FEEDING TUBE Once    calcium gluconate 1g in normal saline 0.9 % 100mL (ready to mix system) Intravenous PRN    calcium gluconate 1g in normal saline 0.9 % 100mL (ready to mix system) Intravenous PRN    calcium gluconate 1g in normal saline 0.9 % 100mL (ready to mix system) Intravenous PRN    [COMPLETED] cefTRIAXone (ROCEPHIN) 2 g in dextrose 5 % 50 mL IVPB Intravenous Once    cefTRIAXone (ROCEPHIN) 2 g in dextrose 5 % 50 mL IVPB Intravenous Q12H    chlorhexidine 0.12 % solution 15 mL Mouth/Throat BID    clorazepate tablet 7.5 mg Per OG tube BID    eslicarbazepine Tab 200 mg Per NG/OG Tube Daily    eslicarbazepine Tab 800 mg Per NG/OG Tube QHS    [COMPLETED] lactated ringers bolus 1,000 mL Intravenous Once    [] levETIRAcetam (KEPPRA) 1,000 mg in dextrose 5 % 100 mL IVPB Intravenous ED 1 Time    [COMPLETED] levETIRAcetam (KEPPRA) 2,000 mg in dextrose 5 % 250 mL IVPB Intravenous ED 1 Time    [COMPLETED] lorazepam injection 2 mg Intravenous ED 1 Time    [COMPLETED] lorazepam injection 2 mg Intravenous ED 1 Time    lorazepam injection 2 mg Intravenous Q15 Min PRN    lorazepam injection 2 mg Intravenous Q4H PRN    lorazepam injection 4 mg Intravenous Once    magnesium sulfate 2g in water 50mL IVPB (premix) Intravenous PRN    magnesium sulfate 2g in water 50mL IVPB (premix) Intravenous PRN    mupirocin 2 % ointment Nasal BID    pantoprazole injection 40 mg Intravenous Daily    potassium chloride 10 mEq in 100 mL IVPB Intravenous PRN    And    potassium chloride 10 mEq in 100 mL IVPB Intravenous PRN    [COMPLETED] propofol (DIPRIVAN) 10 mg/mL infusion Intravenous ED 1 Time    propofol (DIPRIVAN) 10 mg/mL infusion Intravenous Continuous    risperiDONE tablet 1 mg Per OG tube Daily    sodium phosphate 15 mmol in dextrose 5 % 250 mL IVPB Intravenous PRN    sodium phosphate 20.01 mmol in dextrose 5 % 250 mL IVPB  "Intravenous PRN    sodium phosphate 30 mmol in dextrose 5 % 250 mL IVPB Intravenous PRN    [COMPLETED] succinylcholine injection 100 mg Intravenous ED 1 Time    topiramate CSpX 200 mg Per OG tube Daily    topiramate CSpX 300 mg Per OG tube QHS     Outpatient Medications as of 12/24/2019   Medication    clorazepate (TRANXENE) 3.75 MG Tab    eslicarbazepine (APTIOM) 200 mg Tab    eslicarbazepine (APTIOM) 800 mg Tab    risperidone (RISPERDAL M-TABS) 1 MG TbDL    topiramate (QUDEXY XR) 100 mg CSpX    diazepam (DIASTAT) 2.5 mg Kit    lacosamide (VIMPAT) 200 mg Tab    lamotrigine (LAMICTAL) 100 MG tablet    lisdexamfetamine (VYVANSE) 30 MG capsule    phenytoin (DILANTIN) 50 mg chewable tablet    phenytoin (DILANTIN) 50 mg chewable tablet      Review of Systems   Unable to perform ROS: Intubated        I have personally reviewed the following: active problem list, medication list, allergies, family history, social history, health maintenance, notes from last encounter, lab results, imaging and nursing/provider documentation .    Objective     VS: Temp:  [98 °F (36.7 °C)-103.4 °F (39.7 °C)]   Pulse:  []   Resp:  [16-43]   BP: (107-186)/()   SpO2:  [96 %-100 %]    Estimated body mass index is 17.94 kg/m² as calculated from the following:    Height as of this encounter: 5' 5" (1.651 m).    Weight as of this encounter: 48.9 kg (107 lb 12.9 oz).   Male patients must weigh at least 50 kg to calculate ideal body weight   I/O:   Intake/Output Summary (Last 24 hours) at 12/24/2019 1856  Last data filed at 12/24/2019 1601  Gross per 24 hour   Intake 1738.7 ml   Output 1960 ml   Net -221.3 ml        Vent: SpO2 96% on PSV   PE Physical Exam   Constitutional: He appears well-developed and well-nourished. He appears not cachectic.  Non-toxic appearance. No distress. He is sedated and intubated. He is not obese.   HENT:   Head: Normocephalic and atraumatic.   Right Ear: External ear normal.   Left Ear: External " ear normal.   Nose: Nose normal.   Mouth/Throat: Uvula is midline, oropharynx is clear and moist and mucous membranes are normal. Mallampati Score: II.   Neck: Trachea normal and normal range of motion. Neck supple. No JVD present. No tracheal deviation present. No thyromegaly present.   Cardiovascular: Normal rate, regular rhythm, normal heart sounds and intact distal pulses. Exam reveals no gallop and no friction rub.   No murmur heard.  Pulmonary/Chest: Normal expansion, hyperinflation, symmetric chest wall expansion, effort normal and breath sounds normal. No stridor. He is intubated. He has no wheezes. He has no rhonchi. He has no rales.   Abdominal: Soft. Bowel sounds are normal. He exhibits no distension. There is no tenderness. There is no guarding.   Musculoskeletal: Normal range of motion. He exhibits no edema, tenderness or deformity.   Lymphadenopathy: No supraclavicular adenopathy is present.     He has no cervical adenopathy.     He has no axillary adenopathy.   Neurological: He has normal strength. No cranial nerve deficit or sensory deficit. He exhibits normal muscle tone. GCS eye subscore is 2. GCS verbal subscore is 1. GCS motor subscore is 3.   Skin: Skin is warm, dry and intact. No rash noted. He is not diaphoretic. No cyanosis. Nails show no clubbing.   Psychiatric: He has a normal mood and affect. His speech is normal and behavior is normal.   Nursing note and vitals reviewed.       Recent Diagnostic Studies:  Labs I have personally reviewed and interpreted all recent labs / diagnostic studies, and noted the following relevant results:  Recent Lab Results       12/24/19  1505   12/24/19  1029   12/24/19  0814   12/24/19  0629   12/24/19  0422        Procalcitonin               Albumin               Alkaline Phosphatase               Allens Test         N/A     ALT               Ammonia 67             Anion Gap               AST               Baso #               Basophil%                BILIRUBIN TOTAL               Blood Culture, Routine               Site         RR     BUN, Bld               Calcium               Calcium, Ion               Chloride               CO2               Creatinine               DelSys         Adult Vent     Differential Method               eGFR if                eGFR if non                Eos #               Eosinophil%               FiO2         25     Glucose               Gram Stain (Respiratory)               Gran # (ANC)               Gran%               Hematocrit               Hemoglobin               Immature Grans (Abs)               Immature Granulocytes               Lactate, Tonny 2.8  Comment:  Falsely low lactic acid results can be found in samples   containing >=13.0 mg/dL total bilirubin and/or >=3.5 mg/dL   direct bilirubin.  Lacid   critical result(s) called and verbal readback obtained from   Jonas Gabriel RN/ICU2 by [facility] 12/24/2019 15:42   4.0  Comment:  Falsely low lactic acid results can be found in samples   containing >=13.0 mg/dL total bilirubin and/or >=3.5 mg/dL   direct bilirubin.  Lacid   critical result(s) called and verbal readback obtained from   Marnie Florence RN/ICU 2 by [facility] 12/24/2019 11:05     3.8  Comment:  Falsely low lactic acid results can be found in samples   containing >=13.0 mg/dL total bilirubin and/or >=3.5 mg/dL   direct bilirubin.  Lacid   critical result(s) called and verbal readback obtained from   Kristal Thomas RN/ICU2 by [facility] 12/24/2019 07:06         Lymph #               Lymph%               Magnesium               MCH               MCHC               MCV               Min Vol         6.5     Mode         AC/PRVC     Mono #               Mono%               MPV               nRBC               PEEP         5     Phosphorus               PiP         20     Platelets               POC BE         -6     POC Glucose     84   118     POC HCO3         18.7     POC Hematocrit         41     POC  Ionized Calcium         1.18     POC PCO2         29.0     POC pCO2 Temp         32.6     POC PH         7.417     POC pH Temp         7.378     POC PO2         113     POC pO2 Temp         131     POC Potassium         3.5     POC SATURATED O2         99     POC Sodium         139     POC TCO2         20     POC Temp         103.4 F     Potassium               PROTEIN TOTAL               Rate         18     RBC               RDW               Respiratory Culture               Sample         ARTERIAL     Sodium               Sp02         100     TSH               Vit D, 25-Hydroxy 24  Comment:  Vitamin D deficiency.........<10 ng/mL                              Vitamin D insufficiency......10-29 ng/mL       Vitamin D sufficiency........> or equal to 30 ng/mL  Vitamin D toxicity............>100 ng/mL               Vitamin B-12 312             Vt         400     WBC                                12/24/19  0211   12/23/19 2120   12/23/19 2010 12/23/19 1959 12/23/19 1958        Procalcitonin               Albumin 3.5             Alkaline Phosphatase 68             Allens Test       Pass       ALT 17             Ammonia               Anion Gap 10             AST 20             Baso # 0.02 0.04           Basophil% 0.2 0.3           BILIRUBIN TOTAL 0.7  Comment:  For infants and newborns, interpretation of results should be based  on gestational age, weight and in agreement with clinical  observations.  Premature Infant recommended reference ranges:  Up to 24 hours.............<8.0 mg/dL  Up to 48 hours............<12.0 mg/dL  3-5 days..................<15.0 mg/dL  6-29 days.................<15.0 mg/dL               Blood Culture, Routine         No Growth to date[P]     Site       LR       BUN, Bld 9             Calcium 8.4             Calcium, Ion 1.15             Chloride 112             CO2 18             Creatinine 0.9             DelSys       Adult Vent       Differential Method Automated Automated            eGFR if  >60.0             eGFR if non  >60.0  Comment:  Calculation used to obtain the estimated glomerular filtration  rate (eGFR) is the CKD-EPI equation.                Eos # 0.0 0.0           Eosinophil% 0.0 0.1           FiO2       40       Glucose 121             Gram Stain (Respiratory)     Moderate WBC's              Moderate Gram positive cocci              Few Gram positive rods              Few Gram negative coccobacilli         Gran # (ANC) 9.3 10.7           Gran% 82.1 79.5           Hematocrit 40.7 47.2           Hemoglobin 14.2 15.8           Immature Grans (Abs) 0.04  Comment:  Mild elevation in immature granulocytes is non specific and   can be seen in a variety of conditions including stress response,   acute inflammation, trauma and pregnancy. Correlation with other   laboratory and clinical findings is essential.   0.02  Comment:  Mild elevation in immature granulocytes is non specific and   can be seen in a variety of conditions including stress response,   acute inflammation, trauma and pregnancy. Correlation with other   laboratory and clinical findings is essential.             Immature Granulocytes 0.4 0.1           Lactate, Tonny 1.6  Comment:  Falsely low lactic acid results can be found in samples   containing >=13.0 mg/dL total bilirubin and/or >=3.5 mg/dL   direct bilirubin.   2.2  Comment:  Falsely low lactic acid results can be found in samples   containing >=13.0 mg/dL total bilirubin and/or >=3.5 mg/dL   direct bilirubin.  Lactid acid critical result(s) called and verbal readback obtained   from Bianca Veronica RN [place] by [facility] 12/23/2019 22:14       2.7  Comment:  Falsely low lactic acid results can be found in samples   containing >=13.0 mg/dL total bilirubin and/or >=3.5 mg/dL   direct bilirubin.  Lactid acid critical result(s) called and verbal readback obtained   from Bianca Veronica RN [place]  by [facility] 12/23/2019 20:58       Lymph # 0.7 1.3           Lymph% 6.3  9.7           Magnesium 1.4             MCH 32.0 32.1           MCHC 34.9 33.5           MCV 92 96           Min Vol               Mode       AC/PRVC       Mono # 1.3 1.4           Mono% 11.0 10.3           MPV 10.8 10.8           nRBC 0 0           PEEP       5       Phosphorus 3.1             PiP               Platelets 103 105           POC BE       -8       POC Glucose               POC HCO3       17.6       POC Hematocrit               POC Ionized Calcium               POC PCO2       30.2       POC pCO2 Temp               POC PH       7.373       POC pH Temp               POC PO2       218       POC pO2 Temp               POC Potassium               POC SATURATED O2       100       POC Sodium               POC TCO2       19       POC Temp               Potassium 3.0             PROTEIN TOTAL 6.1             Rate       18       RBC 4.44 4.92           RDW 11.3 11.4           Respiratory Culture     Normal respiratory juan[P]         Sample       ARTERIAL       Sodium 140             Sp02       100       TSH               Vit D, 25-Hydroxy               Vitamin B-12               Vt       400       WBC 11.33 13.44                            12/23/19  1945   12/23/19  1914        Procalcitonin   0.06  Comment:  Procalcitonin Result Interpretation:  <=0.50 ng/mL  Systemic infection (sepsis) is not likely. Local bacterial infection   is   possible.  >0.50 and <= 2.00 ng/mL  Systemic infection (sepsis) is possible, but other conditions are   also known   to elevate procalcitonin.   >2.00 ng/mL  Systemic infection (sepsis) is likely unless other causes are known.  >=10.00 ng/mL  Important systemic inflammatory response, almost exclusively due to   severe   bacterial sepsis or septic shock.       Albumin   4.0     Alkaline Phosphatase   78     Allens Test         ALT   18     Ammonia         Anion Gap   8     AST   19     Baso #   0.02     Basophil%   0.2     BILIRUBIN TOTAL   0.6  Comment:  For infants and newborns,  interpretation of results should be based  on gestational age, weight and in agreement with clinical  observations.  Premature Infant recommended reference ranges:  Up to 24 hours.............<8.0 mg/dL  Up to 48 hours............<12.0 mg/dL  3-5 days..................<15.0 mg/dL  6-29 days.................<15.0 mg/dL       Blood Culture, Routine No Growth to date[P]       Site         BUN, Bld   10     Calcium   7.8     Calcium, Ion         Chloride   113     CO2   20     Creatinine   1.0     [employer]         Differential Method   Automated     eGFR if    >60.0     eGFR if non    >60.0  Comment:  Calculation used to obtain the estimated glomerular filtration  rate (eGFR) is the CKD-EPI equation.        Eos #   0.0     Eosinophil%   0.2     FiO2         Glucose   99     Gram Stain (Respiratory)         Gran # (ANC)   8.8     Gran%   89.6     Hematocrit   42.6     Hemoglobin   14.8     Immature Grans (Abs)   0.03  Comment:  Mild elevation in immature granulocytes is non specific and   can be seen in a variety of conditions including stress response,   acute inflammation, trauma and pregnancy. Correlation with other   laboratory and clinical findings is essential.       Immature Granulocytes   0.3     Lactate, Tonny   1.5  Comment:  Falsely low lactic acid results can be found in samples   containing >=13.0 mg/dL total bilirubin and/or >=3.5 mg/dL   direct bilirubin.       Lymph #   0.4     Lymph%   3.6     Magnesium         MCH   32.7     MCHC   34.7     MCV   94     Min Vol         Mode         Mono #   0.6     Mono%   6.1     MPV   10.9     nRBC   0     PEEP         Phosphorus         PiP         Platelets   101     POC BE         POC Glucose         POC HCO3         POC Hematocrit         POC Ionized Calcium         POC PCO2         POC pCO2 Temp         POC PH         POC pH Temp         POC PO2         POC pO2 Temp         POC Potassium         POC SATURATED O2         POC Sodium          POC TCO2         POC Temp         Potassium   3.8     PROTEIN TOTAL   6.2     Rate         RBC   4.53     RDW   11.3     Respiratory Culture         Sample         Sodium   141     Sp02         TSH   0.550     Vit D, 25-Hydroxy         Vitamin B-12         Vt         WBC   9.83         Recent Labs   Lab 12/24/19  0211 12/24/19  0422   WBC 11.33  --    RBC 4.44*  --    HGB 14.2  --    HCT 40.7 41   *  --    MCV 92  --    MCH 32.0*  --    MCHC 34.9  --      --    K 3.0*  --    *  --    CO2 18*  --    BUN 9  --    CREATININE 0.9  --    MG 1.4*  --    ALT 17  --    AST 20  --    ALKPHOS 68  --    BILITOT 0.7  --    PROT 6.1  --    ALBUMIN 3.5  --    PH  --  7.417   PCO2  --  29.0*   PO2  --  113*   HCO3  --  18.7*   POCSATURATED  --  99   BE  --  -6          Imaging I have personally reviewed and interpreted all available images and reviewed the associated Radiology reports.  My personal impression of the relevant studies is as follows:  I have reviewed and interpreted all pertinent imaging results/findings within the past 24 hours.  CT Brain:  No acute intracranial abnormality.    CXR:  Normal aside from appropriately placed ETT.     Micro I have personally reviewed and interpreted the available culture data.  Relevant results are as follows.  Blood Culture   Lab Results   Component Value Date    LABBLOO No Growth to date 12/23/2019   , Sputum Culture   Lab Results   Component Value Date    GSRESP Moderate WBC's 12/23/2019    GSRESP Moderate Gram positive cocci 12/23/2019    GSRESP Few Gram positive rods 12/23/2019    GSRESP Few Gram negative coccobacilli 12/23/2019    RESPIRATORYC Normal respiratory juan 12/23/2019    and Urine Culture    Lab Results   Component Value Date    LABURIN No growth to date 12/23/2019        Assessment       Active Hospital Problems    Diagnosis    *Status epilepticus    Fever    Hypokalemia    Lactic acidosis    Influenza B      My Impression:  Resolved  seizure.  Flu.    Plan     Pulmonary  · Extubated to room air and stable.  · Seizures have resolved.  · Defer to ID re: influenza.  · Continue AEDs at the direction of neurology.  · Laccate due to seizures and shivering with cooling blanket.  · Scheduled antipyretics.  · Replace K.         Thank you for your consult. I will sign off. Please contact us if you have any additional questions.    Ben Velazquez MD  Pulmonary / Critical Care Medicine  [facility]            Critical Care Time: Approximately >35 minutes    The patient is critically ill due to the following conditions that actively pose threat to life and bodily function:  Status Epilepticus with multiple seizures without return to baseline within 20 minutes     The patient is at high risk of death due to central nervous system failure and requiring ongoing treatment including frequent neurologic assessment to prevent further life-threatening deterioration.  Due to a high probability of clinically significant, life threatening deterioration, the patient required my highest level of preparedness to intervene emergently and I personally spent this critical care time directly and personally managing the patient.     Critical care was time spent personally by me on the following activities:    Obtaining a history   Examination of patient.   Reviewing pulse oximetry.   Providing medical care at the patients bedside.   Developing a treatment plan with patient or surrogate and bedside caregivers   Ordering and reviewing laboratory studies, radiographic studies, pulse oximetry.   Ordering and performing treatments and interventions.   Evaluation of patient's response to treatment.   Discussions with consultants while on the unit and immediately available to the patient.   Re-evaluation of the patient's condition.   Documentation in the medical record.    This critical care time did not overlap with that of any other provider or involve  time for any procedures.     Ben Velazquez MD  Pulmonary / Critical Care Medicine  [facility]  12/24/2019  6:56 PM

## 2019-12-25 NOTE — PROCEDURES
REASON FOR STUDY:  Seizures.  DATE OF STUDY: 12/24/2019.  PHYSICIAN REQUESTING/INSTITUTION: Dr. March.   AED: Precedex, Keppra, Aptium, ativan.  METHODS:  EEG was done according to the 10/20 international system.  This is a 4hrs 29min follow EEG. Bipolar and referential montages were used. Video recording was used during the study.      CLINICAL HISTORY:  24 year old patient with recurrent seizures.     FINDINGS:  EEG overall symmetric with continuous low amplitude polymorphic waveforms.       Background rhythm: Theta dominant while eyes are closed and the patient is awake, with good reactivity.    PDR: 6-9 Hz   Mental status: Patient is awake and drowsy during the study.       Artifact: There is occasional eye movement, EKG artifacts, and significant lead artificat.       Sleep: None.         Epileptiform discharges: None     Activation procedures:  Photic stimulation with no abnormalities seen.    ECG: Regular rhythm          IMPRESSION: Mild  encephalopathy. No Seizures.

## 2019-12-26 PROBLEM — G40.901 STATUS EPILEPTICUS: Status: ACTIVE | Noted: 2019-12-26

## 2019-12-26 PROBLEM — E87.20 LACTIC ACIDOSIS: Status: RESOLVED | Noted: 2019-12-24 | Resolved: 2019-12-26

## 2019-12-26 LAB
ALBUMIN SERPL BCP-MCNC: 3.6 G/DL (ref 3.5–5.2)
ALP SERPL-CCNC: 58 U/L (ref 55–135)
ALT SERPL W/O P-5'-P-CCNC: 25 U/L (ref 10–44)
ANION GAP SERPL CALC-SCNC: 8 MMOL/L (ref 8–16)
ANION GAP SERPL CALC-SCNC: 8 MMOL/L (ref 8–16)
AST SERPL-CCNC: 66 U/L (ref 10–40)
BACTERIA UR CULT: NO GROWTH
BASOPHILS # BLD AUTO: 0.03 K/UL (ref 0–0.2)
BASOPHILS # BLD AUTO: 0.03 K/UL (ref 0–0.2)
BASOPHILS NFR BLD: 0.4 % (ref 0–1.9)
BASOPHILS NFR BLD: 0.4 % (ref 0–1.9)
BILIRUB SERPL-MCNC: 1.2 MG/DL (ref 0.1–1)
BUN SERPL-MCNC: 13 MG/DL (ref 6–20)
BUN SERPL-MCNC: 13 MG/DL (ref 6–20)
CALCIUM SERPL-MCNC: 8.7 MG/DL (ref 8.7–10.5)
CALCIUM SERPL-MCNC: 8.7 MG/DL (ref 8.7–10.5)
CHLORIDE SERPL-SCNC: 108 MMOL/L (ref 95–110)
CHLORIDE SERPL-SCNC: 108 MMOL/L (ref 95–110)
CO2 SERPL-SCNC: 21 MMOL/L (ref 23–29)
CO2 SERPL-SCNC: 21 MMOL/L (ref 23–29)
CREAT SERPL-MCNC: 0.7 MG/DL (ref 0.5–1.4)
CREAT SERPL-MCNC: 0.7 MG/DL (ref 0.5–1.4)
DIFFERENTIAL METHOD: ABNORMAL
DIFFERENTIAL METHOD: ABNORMAL
EOSINOPHIL # BLD AUTO: 0.1 K/UL (ref 0–0.5)
EOSINOPHIL # BLD AUTO: 0.1 K/UL (ref 0–0.5)
EOSINOPHIL NFR BLD: 0.9 % (ref 0–8)
EOSINOPHIL NFR BLD: 0.9 % (ref 0–8)
ERYTHROCYTE [DISTWIDTH] IN BLOOD BY AUTOMATED COUNT: 11.4 % (ref 11.5–14.5)
ERYTHROCYTE [DISTWIDTH] IN BLOOD BY AUTOMATED COUNT: 11.4 % (ref 11.5–14.5)
EST. GFR  (AFRICAN AMERICAN): >60 ML/MIN/1.73 M^2
EST. GFR  (AFRICAN AMERICAN): >60 ML/MIN/1.73 M^2
EST. GFR  (NON AFRICAN AMERICAN): >60 ML/MIN/1.73 M^2
EST. GFR  (NON AFRICAN AMERICAN): >60 ML/MIN/1.73 M^2
GLUCOSE SERPL-MCNC: 99 MG/DL (ref 70–110)
GLUCOSE SERPL-MCNC: 99 MG/DL (ref 70–110)
HCT VFR BLD AUTO: 45.8 % (ref 40–54)
HCT VFR BLD AUTO: 45.8 % (ref 40–54)
HGB BLD-MCNC: 16 G/DL (ref 14–18)
HGB BLD-MCNC: 16 G/DL (ref 14–18)
IMM GRANULOCYTES # BLD AUTO: 0.02 K/UL (ref 0–0.04)
IMM GRANULOCYTES # BLD AUTO: 0.02 K/UL (ref 0–0.04)
IMM GRANULOCYTES NFR BLD AUTO: 0.3 % (ref 0–0.5)
IMM GRANULOCYTES NFR BLD AUTO: 0.3 % (ref 0–0.5)
LYMPHOCYTES # BLD AUTO: 2 K/UL (ref 1–4.8)
LYMPHOCYTES # BLD AUTO: 2 K/UL (ref 1–4.8)
LYMPHOCYTES NFR BLD: 24.7 % (ref 18–48)
LYMPHOCYTES NFR BLD: 24.7 % (ref 18–48)
MAGNESIUM SERPL-MCNC: 1.7 MG/DL (ref 1.6–2.6)
MCH RBC QN AUTO: 32.3 PG (ref 27–31)
MCH RBC QN AUTO: 32.3 PG (ref 27–31)
MCHC RBC AUTO-ENTMCNC: 34.9 G/DL (ref 32–36)
MCHC RBC AUTO-ENTMCNC: 34.9 G/DL (ref 32–36)
MCV RBC AUTO: 93 FL (ref 82–98)
MCV RBC AUTO: 93 FL (ref 82–98)
MONOCYTES # BLD AUTO: 0.9 K/UL (ref 0.3–1)
MONOCYTES # BLD AUTO: 0.9 K/UL (ref 0.3–1)
MONOCYTES NFR BLD: 11.2 % (ref 4–15)
MONOCYTES NFR BLD: 11.2 % (ref 4–15)
NEUTROPHILS # BLD AUTO: 5 K/UL (ref 1.8–7.7)
NEUTROPHILS # BLD AUTO: 5 K/UL (ref 1.8–7.7)
NEUTROPHILS NFR BLD: 62.5 % (ref 38–73)
NEUTROPHILS NFR BLD: 62.5 % (ref 38–73)
NRBC BLD-RTO: 0 /100 WBC
NRBC BLD-RTO: 0 /100 WBC
PHOSPHATE SERPL-MCNC: 2.6 MG/DL (ref 2.7–4.5)
PLATELET # BLD AUTO: 80 K/UL (ref 150–350)
PLATELET # BLD AUTO: 80 K/UL (ref 150–350)
PMV BLD AUTO: 11.9 FL (ref 9.2–12.9)
PMV BLD AUTO: 11.9 FL (ref 9.2–12.9)
POTASSIUM SERPL-SCNC: 3.4 MMOL/L (ref 3.5–5.1)
POTASSIUM SERPL-SCNC: 3.4 MMOL/L (ref 3.5–5.1)
PROT SERPL-MCNC: 6.6 G/DL (ref 6–8.4)
RBC # BLD AUTO: 4.95 M/UL (ref 4.6–6.2)
RBC # BLD AUTO: 4.95 M/UL (ref 4.6–6.2)
SODIUM SERPL-SCNC: 137 MMOL/L (ref 136–145)
SODIUM SERPL-SCNC: 137 MMOL/L (ref 136–145)
WBC # BLD AUTO: 7.95 K/UL (ref 3.9–12.7)
WBC # BLD AUTO: 7.95 K/UL (ref 3.9–12.7)

## 2019-12-26 PROCEDURE — 94761 N-INVAS EAR/PLS OXIMETRY MLT: CPT

## 2019-12-26 PROCEDURE — 83735 ASSAY OF MAGNESIUM: CPT

## 2019-12-26 PROCEDURE — 25000003 PHARM REV CODE 250

## 2019-12-26 PROCEDURE — 36415 COLL VENOUS BLD VENIPUNCTURE: CPT

## 2019-12-26 PROCEDURE — 12000002 HC ACUTE/MED SURGE SEMI-PRIVATE ROOM

## 2019-12-26 PROCEDURE — 25000003 PHARM REV CODE 250: Performed by: NURSE PRACTITIONER

## 2019-12-26 PROCEDURE — 97116 GAIT TRAINING THERAPY: CPT

## 2019-12-26 PROCEDURE — 63600175 PHARM REV CODE 636 W HCPCS: Performed by: STUDENT IN AN ORGANIZED HEALTH CARE EDUCATION/TRAINING PROGRAM

## 2019-12-26 PROCEDURE — 97162 PT EVAL MOD COMPLEX 30 MIN: CPT

## 2019-12-26 PROCEDURE — 84100 ASSAY OF PHOSPHORUS: CPT

## 2019-12-26 PROCEDURE — 25000003 PHARM REV CODE 250: Performed by: INTERNAL MEDICINE

## 2019-12-26 PROCEDURE — 80053 COMPREHEN METABOLIC PANEL: CPT

## 2019-12-26 PROCEDURE — 85025 COMPLETE CBC W/AUTO DIFF WBC: CPT

## 2019-12-26 PROCEDURE — 25000003 PHARM REV CODE 250: Performed by: STUDENT IN AN ORGANIZED HEALTH CARE EDUCATION/TRAINING PROGRAM

## 2019-12-26 PROCEDURE — C9113 INJ PANTOPRAZOLE SODIUM, VIA: HCPCS | Performed by: STUDENT IN AN ORGANIZED HEALTH CARE EDUCATION/TRAINING PROGRAM

## 2019-12-26 RX ORDER — TOPIRAMATE 100 MG/1
300 CAPSULE, EXTENDED RELEASE ORAL NIGHTLY
Status: DISCONTINUED | OUTPATIENT
Start: 2019-12-26 | End: 2019-12-27 | Stop reason: HOSPADM

## 2019-12-26 RX ORDER — CLORAZEPATE DIPOTASSIUM 7.5 MG/1
7.5 TABLET ORAL 2 TIMES DAILY
Status: DISCONTINUED | OUTPATIENT
Start: 2019-12-26 | End: 2019-12-27 | Stop reason: HOSPADM

## 2019-12-26 RX ORDER — PANTOPRAZOLE SODIUM 40 MG/1
40 TABLET, DELAYED RELEASE ORAL DAILY
Status: DISCONTINUED | OUTPATIENT
Start: 2019-12-27 | End: 2019-12-27 | Stop reason: HOSPADM

## 2019-12-26 RX ORDER — ACETAMINOPHEN 325 MG/1
650 TABLET ORAL EVERY 6 HOURS PRN
Status: DISCONTINUED | OUTPATIENT
Start: 2019-12-26 | End: 2019-12-27 | Stop reason: HOSPADM

## 2019-12-26 RX ORDER — RISPERIDONE 0.25 MG/1
1 TABLET ORAL DAILY
Status: DISCONTINUED | OUTPATIENT
Start: 2019-12-26 | End: 2019-12-27 | Stop reason: HOSPADM

## 2019-12-26 RX ORDER — RISPERIDONE 0.25 MG/1
1 TABLET ORAL DAILY
Status: DISCONTINUED | OUTPATIENT
Start: 2019-12-27 | End: 2019-12-26

## 2019-12-26 RX ORDER — TOPIRAMATE 100 MG/1
200 CAPSULE, EXTENDED RELEASE ORAL DAILY
Status: DISCONTINUED | OUTPATIENT
Start: 2019-12-27 | End: 2019-12-27 | Stop reason: HOSPADM

## 2019-12-26 RX ADMIN — MUPIROCIN: 20 OINTMENT TOPICAL at 09:12

## 2019-12-26 RX ADMIN — TOPIRAMATE: 100 CAPSULE, EXTENDED RELEASE ORAL at 09:12

## 2019-12-26 RX ADMIN — PANTOPRAZOLE SODIUM 40 MG: 40 INJECTION, POWDER, FOR SOLUTION INTRAVENOUS at 09:12

## 2019-12-26 RX ADMIN — TOPIRAMATE 300 MG: 100 CAPSULE, EXTENDED RELEASE ORAL at 09:12

## 2019-12-26 RX ADMIN — CHLORHEXIDINE GLUCONATE 15 ML: 1.2 RINSE ORAL at 08:12

## 2019-12-26 RX ADMIN — CLORAZEPATE DIPOTASSIUM 7.5 MG: 7.5 TABLET ORAL at 09:12

## 2019-12-26 RX ADMIN — CLORAZEPATE DIPOTASSIUM 7.5 MG: 7.5 TABLET ORAL at 08:12

## 2019-12-26 RX ADMIN — CHLORHEXIDINE GLUCONATE 15 ML: 1.2 RINSE ORAL at 09:12

## 2019-12-26 RX ADMIN — LEVETIRACETAM 250 MG: 250 TABLET, FILM COATED ORAL at 09:12

## 2019-12-26 RX ADMIN — RISPERIDONE 1 MG: 0.25 TABLET ORAL at 08:12

## 2019-12-26 NOTE — PLAN OF CARE
Assessment done. DC plans are to go home with mother. Cm to follow until discharge from hospital.     12/26/19 1108   Discharge Assessment   Assessment Type Discharge Planning Assessment   Confirmed/corrected address and phone number on facesheet? Yes   Assessment information obtained from? Other  (Mother Crys)   Communicated expected length of stay with patient/caregiver no   Prior to hospitilization cognitive status: Unable to Assess   Prior to hospitalization functional status: Independent   Current cognitive status: Unable to Assess  (Pt would only smile when asking him questions;)   Current Functional Status: Needs Assistance   Lives With parent(s)   Able to Return to Prior Arrangements yes   Is patient able to care for self after discharge? Yes   Who are your caregiver(s) and their phone number(s)? Crys [place] mother [phone]  Peng braxton parent  [phone]   Patient's perception of discharge disposition home or selfcare   Readmission Within the Last 30 Days no previous admission in last 30 days   Patient currently being followed by outpatient case management? No   Patient currently receives any other outside agency services? No   Equipment Currently Used at Home wheelchair   Part D Coverage Medicaid   Do you have any problems affording any of your prescribed medications? No   Is the patient taking medications as prescribed? yes   Does the patient have transportation home? Yes   Transportation Anticipated family or friend will provide   Does the patient receive services at the [facility]? No   Discharge Plan A Home with family   DME Needed Upon Discharge  none   Patient/Family in Agreement with Plan yes

## 2019-12-26 NOTE — PLAN OF CARE
Problem: Oral Intake Inadequate  Goal: Improved Oral Intake  Outcome: Ongoing, Progressing  Intervention: Promote and Optimize Oral Intake  Flowsheets (Taken 12/26/2019 1258)  Oral Nutrition Promotion: calorie dense liquids provided   Added Ensure Enlive TID to aid in meeting estimated needs. (Provides: 1050 kcals, 60 g protein)  Added Ensure Pudding BID to aid in meeting estimated needs (Provides: 340 kcals, 8 g protein).

## 2019-12-26 NOTE — PLAN OF CARE
Problem: Fall Injury Risk  Goal: Absence of Fall and Fall-Related Injury  Outcome: Ongoing, Progressing     Problem: Adult Inpatient Plan of Care  Goal: Plan of Care Review  Outcome: Ongoing, Progressing  Goal: Patient-Specific Goal (Individualization)  Outcome: Ongoing, Progressing  Goal: Absence of Hospital-Acquired Illness or Injury  Outcome: Ongoing, Progressing  Goal: Optimal Comfort and Wellbeing  Outcome: Ongoing, Progressing  Goal: Readiness for Transition of Care  Outcome: Ongoing, Progressing  Goal: Rounds/Family Conference  Outcome: Ongoing, Progressing     Problem: Skin Injury Risk Increased  Goal: Skin Health and Integrity  Outcome: Ongoing, Progressing     Problem: Infection  Goal: Infection Symptom Resolution  Outcome: Ongoing, Progressing     Problem: Seizure, Active Management  Goal: Absence of Seizure/Seizure-Related Injury  Outcome: Ongoing, Progressing     Problem: Oral Intake Inadequate  Goal: Improved Oral Intake  Outcome: Ongoing, Progressing

## 2019-12-26 NOTE — PT/OT/SLP EVAL
Physical Therapy Evaluation    Patient Name:  Gonsalo Mei III   MRN:  [id number]    Recommendations:     Discharge Recommendations:  home   Discharge Equipment Recommendations: none   Barriers to discharge: None    Assessment:     Gonsalo Mei III is a 24 y.o. male admitted with a medical diagnosis of Status epilepticus.  He presents with the following impairments/functional limitations:  weakness, gait instability, decreased upper extremity function, impaired endurance, impaired balance, decreased safety awareness, impaired self care skills, impaired cognition, impaired functional mobilty. Pt is on droplet precautions secondary to having the flu. Pt verbalizes yes/no, but questions needing a longer answer were redirected to his mother. He does not use an AD at baseline. He has a WC and shower chair. He goes to [facility] in [place] 5 days a week. He does not have HH or OP PT/OT. Today he required Haider for transfers. He ambulated 20' in room. RW was used secondary to added stability after not being OOB for a few days. He requires verbal and tactile commands for RW use. PT will continue to see patient until his baseline is established.     Rehab Prognosis: Fair; patient would benefit from acute skilled PT services to address these deficits and reach maximum level of function.    Recent Surgery: * No surgery found *      Plan:     During this hospitalization, patient to be seen 5 x/week to address the identified rehab impairments via gait training, therapeutic activities, therapeutic exercises and progress toward the following goals:    · Plan of Care Expires:  01/26/20    Subjective     Chief Complaint: none verbalized  Patient/Family Comments/goals: none verbalized  Pain/Comfort:  · Pain Rating 1: 0/10    Patients cultural, spiritual, Sabianist conflicts given the current situation:      Living Environment:  Pt lives with his mother.  Prior to admission, patients level of function was modified independent.  Equipment used at home: wheelchair, shower chair.  DME owned (not currently used): none.  Upon discharge, patient will have assistance from family.    Objective:     Communicated with RN prior to session.  Patient found HOB elevated with bed alarm, telemetry, blood pressure cuff, pulse ox (continuous), mcnair catheter  upon PT entry to room.    General Precautions: Standard, fall   Orthopedic Precautions:N/A   Braces: N/A     Exams:  · Cognitive Exam:  Patient is oriented to Person  · RUE Strength: Deficits: 3-/5  · LUE Strength: Deficits: 3-/5  · RLE Strength: Deficits: 3-/5  · LLE Strength: Deficits: 3-/5    Functional Mobility:  · Bed Mobility:     · Supine to Sit: minimum assistance  · Transfers:     · Sit to Stand:  minimum assistance with no AD  · Gait: 20' RW CGA; VC and TC for RW use  · Balance: sitting: fair, standing: poor      Therapeutic Activities and Exercises:   Patient was educated on the importance of OOB activity during hospital stay, safe transfers and ambulation     AM-PAC 6 CLICK MOBILITY  Total Score:17     Patient left HOB elevated with all lines intact, RN notified and family present.    GOALS:   Multidisciplinary Problems     Physical Therapy Goals        Problem: Physical Therapy Goal    Goal Priority Disciplines Outcome Goal Variances Interventions   Physical Therapy Goal     PT, PT/OT      Description:  Goals to be met by: D/C     Patient will increase functional independence with mobility by performin. Supine to sit with Stand-by Assistance  2. Sit to stand transfer with Stand-by Assistance  3. Gait  x 100 feet with Stand-by Assistance                       History:     Past Medical History:   Diagnosis Date    Cerebellar atrophy     by MRI    Epilepsy        Past Surgical History:   Procedure Laterality Date    TONSILLECTOMY      vagal nerve stimulator () Left        Time Tracking:     PT Received On: 19  PT Start Time: 1025     PT Stop Time: 1051  PT Total Time  (min): 26 min     Billable Minutes: Evaluation 10 and Gait Training 16      Breanna Shaffer, PT  12/26/2019

## 2019-12-26 NOTE — PLAN OF CARE
Pt. Improving, mcnair removed, ambulated with pt today. He will be discharged after afebrile for 24 hours. Tmax today 99.

## 2019-12-26 NOTE — SUBJECTIVE & OBJECTIVE
Interval History: improving    Review of Systems   Constitutional: Negative.    HENT: Negative.    Eyes: Negative.    Respiratory: Negative.    Cardiovascular: Negative.    Gastrointestinal: Negative.    Endocrine: Negative.    Genitourinary: Negative.    Musculoskeletal: Negative.    Skin: Negative.    Allergic/Immunologic: Negative.    Neurological: Negative.    Hematological: Negative.    All other systems reviewed and are negative.    Objective:     Vital Signs (Most Recent):  Temp: 97.7 °F (36.5 °C) (12/26/19 0701)  Pulse: 81 (12/26/19 1001)  Resp: (!) 25 (12/26/19 1001)  BP: (!) 98/55 (12/26/19 1001)  SpO2: 99 % (12/26/19 1001) Vital Signs (24h Range):  Temp:  [97.7 °F (36.5 °C)-100.6 °F (38.1 °C)] 97.7 °F (36.5 °C)  Pulse:  [] 81  Resp:  [19-42] 25  SpO2:  [93 %-100 %] 99 %  BP: ()/(51-71) 98/55     Weight: 49.7 kg (109 lb 9.1 oz)  Body mass index is 18.23 kg/m².    Intake/Output Summary (Last 24 hours) at 12/26/2019 1029  Last data filed at 12/25/2019 1757  Gross per 24 hour   Intake 500 ml   Output 1700 ml   Net -1200 ml      Physical Exam   Constitutional: He is oriented to person, place, and time. He appears well-developed and well-nourished.   HENT:   Head: Normocephalic and atraumatic.   Eyes: Pupils are equal, round, and reactive to light. Conjunctivae and EOM are normal.   Neck: Normal range of motion. Neck supple.   Cardiovascular: Normal rate, regular rhythm, normal heart sounds and intact distal pulses.   Pulmonary/Chest: Effort normal and breath sounds normal.   Abdominal: Soft. Bowel sounds are normal.   Musculoskeletal: Normal range of motion.   Neurological: He is alert and oriented to person, place, and time.   Developmentally delayed   Skin: Skin is warm and dry. Capillary refill takes less than 2 seconds.   Psychiatric: He has a normal mood and affect. His behavior is normal. Judgment and thought content normal.   Nursing note and vitals reviewed.      Significant Labs:   BMP:    Recent Labs   Lab 12/26/19  0400   GLU 99  99     137   K 3.4*  3.4*     108   CO2 21*  21*   BUN 13  13   CREATININE 0.7  0.7   CALCIUM 8.7  8.7   MG 1.7     CBC:   Recent Labs   Lab 12/25/19  0454 12/26/19  0400   WBC 15.37* 7.95  7.95   HGB 15.7 16.0  16.0   HCT 46.8 45.8  45.8   PLT 81* 80*  80*       Significant Imaging: I have reviewed and interpreted all pertinent imaging results/findings within the past 24 hours.

## 2019-12-26 NOTE — PROGRESS NOTES
[facility]  Progress Note    Patient Name: Gonsalo Mei III  MRN: [id number]  Patient Class: IP- Inpatient   Admission Date: 12/23/2019  Length of Stay: 3 days  Attending Physician: Jamaal Mosley MD  Primary Care Provider: Alex Charles MD        Subjective:     Principal Problem:Status epilepticus        HPI:  23 yo AAM with PMH of Epilepsy presented to ED for seizures. Unable to obtain history from patient (intubated/sedated)    Spoke with ER physicain, Dr. Pink who states that patient was BIBA and that he received versed 5mg at home by mother and another 5mg versed by EMS. He was actively seizing upon ED arrival. He was given 4 mg IV Ativan and intubated for Airway protection.  Neurology consulted and recommended stat EEG, Keppra 3g load and c/w home medications.    Per patient's mother, patient has complained for flu like symptoms (runny nose, congestion, fatigue) over the past day. She states that she witnessed the seizure which was like his usual seizure activity (left sided shaking movements with eye deviation to left). She states that she usually gives him Versed 5 mg and seizures resolve, however this time it did not and she called EMS. She states that he had seizures in the past when he had the flu. Last seizure in early spring. Up to date on immunizations including flu shot. compliant with all medications.       Overview/Hospital Course:  12/24 Remains sedated and intubated, though propofol is currently off for EEG.  No seizure activity on EEG as per Dr. Arjun Pinzon. ID attendance noted and appreciated. Tmax 103 F. Labs personally reviewed: leukocytosis has resolved.Has developed hypokalemia (will be covered). Lactate = 3.8. Telemetry personally reviewed = sinus  12/25 Extubated. Mother and caregiver state patient is back to baseline. Has been apyrexic since yesterday. Patient denies pain. Is hungry. Will watch in ICU through-out day. If remains apyrexic and no  "further seizure, will transfer out of ICU and hopefully discharge in AM if no further fever/seizure. Labs personally reviewed: leukocytosis persisting; hypokalemia resolved. Telemetry personally reviewed: sinus.  112/26 100.6 temp last PM. Discussed patient personally with Dr. Lopez, to keep til apyrexic =  24 hrs. Discussed patient personally with Dr. Arjun Pinzon: is cleared from Neurology for discharge. Patient has yet to work with PT. Mother and caregiver at bedside. No new complaints. Indicates he feels "OK". Labs personally reviewed leukocytosis has resolved. Has mild hypokalemia, which is being covered. Telemetry personally reviewed = sinus. Will discontinue Rice and transfer to med/jl without telemetry; continue PT    Interval History: improving    Review of Systems   Constitutional: Negative.    HENT: Negative.    Eyes: Negative.    Respiratory: Negative.    Cardiovascular: Negative.    Gastrointestinal: Negative.    Endocrine: Negative.    Genitourinary: Negative.    Musculoskeletal: Negative.    Skin: Negative.    Allergic/Immunologic: Negative.    Neurological: Negative.    Hematological: Negative.    All other systems reviewed and are negative.    Objective:     Vital Signs (Most Recent):  Temp: 97.7 °F (36.5 °C) (12/26/19 0701)  Pulse: 81 (12/26/19 1001)  Resp: (!) 25 (12/26/19 1001)  BP: (!) 98/55 (12/26/19 1001)  SpO2: 99 % (12/26/19 1001) Vital Signs (24h Range):  Temp:  [97.7 °F (36.5 °C)-100.6 °F (38.1 °C)] 97.7 °F (36.5 °C)  Pulse:  [] 81  Resp:  [19-42] 25  SpO2:  [93 %-100 %] 99 %  BP: ()/(51-71) 98/55     Weight: 49.7 kg (109 lb 9.1 oz)  Body mass index is 18.23 kg/m².    Intake/Output Summary (Last 24 hours) at 12/26/2019 1029  Last data filed at 12/25/2019 1757  Gross per 24 hour   Intake 500 ml   Output 1700 ml   Net -1200 ml      Physical Exam   Constitutional: He is oriented to person, place, and time. He appears well-developed and well-nourished.   HENT:   Head: " Normocephalic and atraumatic.   Eyes: Pupils are equal, round, and reactive to light. Conjunctivae and EOM are normal.   Neck: Normal range of motion. Neck supple.   Cardiovascular: Normal rate, regular rhythm, normal heart sounds and intact distal pulses.   Pulmonary/Chest: Effort normal and breath sounds normal.   Abdominal: Soft. Bowel sounds are normal.   Musculoskeletal: Normal range of motion.   Neurological: He is alert and oriented to person, place, and time.   Developmentally delayed   Skin: Skin is warm and dry. Capillary refill takes less than 2 seconds.   Psychiatric: He has a normal mood and affect. His behavior is normal. Judgment and thought content normal.   Nursing note and vitals reviewed.      Significant Labs:   BMP:   Recent Labs   Lab 12/26/19  0400   GLU 99  99     137   K 3.4*  3.4*     108   CO2 21*  21*   BUN 13  13   CREATININE 0.7  0.7   CALCIUM 8.7  8.7   MG 1.7     CBC:   Recent Labs   Lab 12/25/19  0454 12/26/19  0400   WBC 15.37* 7.95  7.95   HGB 15.7 16.0  16.0   HCT 46.8 45.8  45.8   PLT 81* 80*  80*       Significant Imaging: I have reviewed and interpreted all pertinent imaging results/findings within the past 24 hours.      Assessment/Plan:      Fever  Continue current regimen      Influenza B  Per ID recommendations          VTE Risk Mitigation (From admission, onward)         Ordered     IP VTE LOW RISK PATIENT  Once      12/23/19 2040                      Jamaal Mosley MD  Department of Hospital Medicine   [facility]

## 2019-12-26 NOTE — PROGRESS NOTES
Consult Note  Infectious Disease    Reason for Consult:  fever    HPI: Gonsalo Mei III is a healthy appearing 24 y.o. male with a history of epilepsy since childhood who was in his usual state of health on 12/22, but yesterday was on the couch all day, less talkative, eating normally who abruptly began to have seizures. His family gave his intranasal Versed without resolution and he was brought to the ED. In the ED he had a 103 degree temperature and was found to be Influenza B positive. He had persistent seizure activity despite 4 mg of Ativan and he was intubated, given a large load of Keppra and admitted to the ICU. He did receive the flu vaccine in October, but does spend time in day community with other adults with disabilities. He did have a runny nose and mild cough prior to presentation. Family denies that he complained of headache, diarrhea, rash, shortness of breath. No other family members are ill. He has a history of lowered seizure threshold with Tamiflu and this was not prescribed. Infections have triggered seizures in the past.     12/25: tmax 103 , tylenol has been prn since yesterday afternoon after extubation. cxr remains clear. Mother at bedside and acknowledges that he is very near baseline. He had pudding. He states his abdomen is sore, but not GI pain. No diarrhea. Breathing comfortably on RA.   12/26: tmax 100.6 at midnight. He has no new issues. His family is not at bedside. He has no aches or pains. Denies headache. Eating fair. No SOB, chest pain, abd pain, nausea, vomiting, diarrhea. No rashes or IV site problems. He seems a little sleepy. CXR still clear.     EXAM & DIAGNOSTICS REVIEWED:   Vitals:     Temp:  [97.7 °F (36.5 °C)-100.6 °F (38.1 °C)]   Temp: 97.7 °F (36.5 °C) (12/26/19 0701)  Pulse: 64 (12/26/19 0720)  Resp: (!) 21 (12/26/19 0720)  BP: 114/67 (12/26/19 0701)  SpO2: 100 % (12/26/19 0720)    Intake/Output Summary (Last 24 hours) at 12/26/2019 0837  Last data filed at  12/25/2019 1757  Gross per 24 hour   Intake 800 ml   Output 1700 ml   Net -900 ml       General:   alert, an dcooperative, speaks softly  Eyes:  Anicteric, PERRL, EOMi  ENT:  No ulcers, exudates, thrush, nares patent, dentition is good  Neck:  supple,    Lungs: Clear, no consolidation, rales, wheezes, rub  Heart:  RRR, no gallop/murmur/rub noted  Abd:  Soft, NT, ND, normal BS, no masses or organomegaly appreciated. Orogastric tube in place  :   Rice, urine clear, no flank tenderness  Musc:  Joints without effusion, swelling, erythema, synovitis, muscle wasting.   Skin:  No rashes. No palmar or plantar lesions. No subungual petechiae  Wound:   Neuro:  alert, attends, interacts, speech is slow but fairly clear(speech impediment), moves all extremities  Psych:  Smiling, pleasant, cooperative  Lymphatic:       Extrem: No edema, erythema, phlebitis, cellulitis, warm and well perfused  VAD:  peripherals   Isolation:  droplet    Lines/Tubes/Drains:    General Labs reviewed:  Recent Labs   Lab 12/24/19  0211 12/24/19  0422 12/25/19  0454 12/26/19  0400   WBC 11.33  --  15.37* 7.95  7.95   HGB 14.2  --  15.7 16.0  16.0   HCT 40.7 41 46.8 45.8  45.8   *  --  81* 80*  80*       Recent Labs   Lab 12/24/19  0211 12/25/19  0454 12/26/19  0400    140 137  137   K 3.0* 4.5 3.4*  3.4*   * 108 108  108   CO2 18* 24 21*  21*   BUN 9 10 13  13   CREATININE 0.9 0.9 0.7  0.7   CALCIUM 8.4* 8.5* 8.7  8.7   PROT 6.1 6.6 6.6   BILITOT 0.7 1.1* 1.2*   ALKPHOS 68 61 58   ALT 17 25 25   AST 20 76* 66*     procal 0.06  Influenza B positive    Micro:  Microbiology Results (last 7 days)     Procedure Component Value Units Date/Time    Urine culture High Risk **CANNOT BE ORDERED STAT** [[id number]] Collected:  12/23/19 1846    Order Status:  Completed Specimen:  Urine, Catheterized Updated:  12/26/19 0748     Urine Culture, Routine No growth    Narrative:       Indicated criteria for high risk culture:->Other  Other  (specify):->intubated, febrile    Blood culture #1 **CANNOT BE ORDERED STAT** [[id number]] Collected:  12/23/19 1945    Order Status:  Completed Specimen:  Blood from Peripheral, Forearm, Right Updated:  12/25/19 2232     Blood Culture, Routine No Growth to date      No Growth to date      No Growth to date    Blood culture #2 **CANNOT BE ORDERED STAT** [[id number]] Collected:  12/23/19 1958    Order Status:  Completed Specimen:  Blood from Peripheral, Hand, Right Updated:  12/25/19 2232     Blood Culture, Routine No Growth to date      No Growth to date      No Growth to date    Culture, Respiratory with Gram Stain [[id number]] Collected:  12/23/19 2010    Order Status:  Completed Specimen:  Respiratory from Tracheal Aspirate Updated:  12/25/19 0651     Respiratory Culture Normal respiratory juan     Gram Stain (Respiratory) Moderate WBC's     Gram Stain (Respiratory) Moderate Gram positive cocci     Gram Stain (Respiratory) Few Gram positive rods     Gram Stain (Respiratory) Few Gram negative coccobacilli        Imaging Reviewed:   CXR clear   CT head negative    Cardiology:    IMPRESSION & PLAN   1. Status epilepticus, epilepsy since age 3  2. Influenza B with high fever and lactic acidosis, improved  3. Developmentally delayed      Recommendations:  Ok with me to move out of ICU  Out of bed   remove mcnair  Droplet isolation until he has had no fever for 24h

## 2019-12-26 NOTE — NURSING TRANSFER
Nursing Transfer Note      12/26/2019     Transfer To: 1113    Transfer via bed    Transfer with n/a    Transported by [facility]    Medicines sent: Yes    Chart send with patient: Yes    Notified: friend    Patient reassessed at: 12/25     Upon arrival to floor: patient oriented to room, call bell in reach and bed in lowest position

## 2019-12-26 NOTE — PLAN OF CARE
12/26/19 0720   Patient Assessment/Suction   Level of Consciousness (AVPU) alert   Respiratory Effort Normal;Unlabored   Expansion/Accessory Muscles/Retractions no retractions   All Lung Fields Breath Sounds coarse   Rhythm/Pattern, Respiratory no shortness of breath reported   Cough Frequency infrequent   PRE-TX-O2   O2 Device (Oxygen Therapy) room air   SpO2 100 %   Pulse Oximetry Type Continuous   $ Pulse Oximetry - Multiple Charge Pulse Oximetry - Multiple   Pulse 64   Resp (!) 21

## 2019-12-26 NOTE — PROGRESS NOTES
"[facility]  Adult Nutrition   Progress Note (Initial Assessment)     SUMMARY     Recommendations/Interventions:  1. Continue current diet as tolerated, encourage intake.   2. Added Ensure Enlive TID to aid in meeting estimated needs. (Provides: 1050 kcals, 60 g protein)  3. Added Ensure Pudding BID to aid in meeting estimated needs (Provides: 340 kcals, 8 g protein).  Goals:   1. Pt to meet estimated needs via PO intake of meals and supplements.  Nutrition Goal Status: new    Reason for Assessment    Reason For Assessment: (ICU status )  Diagnosis: (Influenza )  Relevant Medical History: Cerebellar atrophy, epilepsy   Interdisciplinary Rounds: attended    Nutrition Risk Screen    Nutrition Risk Screen: no indicators present     MST Score: 0  Have you recently lost weight without trying?: No  Weight loss score: 0  Have you been eating poorly because of a decreased appetite?: No  Appetite score: 0       Nutrition/Diet History    Food Allergies: NKFA  Factors Affecting Nutritional Intake: None identified at this time    Anthropometrics    Temp: 99 °F (37.2 °C)  Height Method: Stated  Height: 5' 5" (165.1 cm)  Height (inches): 65 in  Weight Method: Bed Scale  Weight: 49.7 kg (109 lb 9.1 oz)  Weight (lb): 109.57 lb  Ideal Body Weight (IBW), Male: 136 lb  % Ideal Body Weight, Male (lb): 80.73 %  BMI (Calculated): 18.2  BMI Grade: 17 - 18.4 protein-energy malnutrition grade I     Weight History:  Wt Readings from Last 10 Encounters:   12/25/19 49.7 kg (109 lb 9.1 oz)   11/12/13 63.5 kg (140 lb) (45 %, Z= -0.12)*     * Growth percentiles are based on CDC (Boys, 2-20 Years) data.     Lab/Procedures/Meds: Pertinent Labs Reviewed  Clinical Chemistry:  Recent Labs   Lab 12/24/19  0211 12/25/19  0454 12/26/19  0400    140 137  137   K 3.0* 4.5 3.4*  3.4*   * 108 108  108   CO2 18* 24 21*  21*   * 86 99  99   BUN 9 10 13  13   CREATININE 0.9 0.9 0.7  0.7   CALCIUM 8.4* 8.5* 8.7  8.7 "   PROT 6.1 6.6 6.6   ALBUMIN 3.5 3.6 3.6   BILITOT 0.7 1.1* 1.2*   ALKPHOS 68 61 58   AST 20 76* 66*   ALT 17 25 25   ANIONGAP 10 8 8  8   ESTGFRAFRICA >60.0 >60.0 >60.0  >60.0   EGFRNONAA >60.0 >60.0 >60.0  >60.0   MG 1.4* 1.9 1.7   PHOS 3.1 2.9 2.6*     CBC:   Recent Labs   Lab 12/26/19  0400   WBC 7.95  7.95   RBC 4.95  4.95   HGB 16.0  16.0   HCT 45.8  45.8   PLT 80*  80*   MCV 93  93   MCH 32.3*  32.3*   MCHC 34.9  34.9     Diabetes:  Recent Labs   Lab 12/24/19  1505   HGBA1C 4.6     Thyroid & Parathyroid:  Recent Labs   Lab 12/23/19  1914   TSH 0.550       Medications: Pertinent Medications reviewed  Scheduled Meds:   chlorhexidine  15 mL Mouth/Throat BID    clorazepate  7.5 mg Per OG tube BID    eslicarbazepine  400 mg Per NG/OG Tube Daily    eslicarbazepine  800 mg Per NG/OG Tube QHS    levETIRAcetam  250 mg Oral BID    lorazepam  4 mg Intravenous Once    mupirocin   Nasal BID    pantoprazole  40 mg Intravenous Daily    risperiDONE  1 mg Per OG tube Daily    topiramate  200 mg Per OG tube Daily    topiramate  300 mg Per OG tube QHS     Continuous Infusions:   propofol 10 mcg/kg/min (12/24/19 0832)     PRN Meds:.acetaminophen, calcium gluconate IVPB, calcium gluconate IVPB, calcium gluconate IVPB, lorazepam, lorazepam, magnesium sulfate IVPB, magnesium sulfate IVPB, potassium chloride in water **AND** potassium chloride in water, sodium phosphate IVPB, sodium phosphate IVPB, sodium phosphate IVPB    Estimated/Assessed Needs    Weight Used For Calorie Calculations: 49.7 kg (109 lb 9.1 oz)  Energy Calorie Requirements (kcal): 4239-8512 kcals/day (30-35 kcals/kg)  Energy Need Method: Kcal/kg     Weight Used For Protein Calculations: 49.7 kg (109 lb 9.1 oz)     Estimated Fluid Requirement Method: RDA Method    Nutrition Prescription Ordered    Current Diet Order: Regular Diet     Evaluation of Received Nutrient/Fluid Intake    Energy Calories Required: not meeting needs  Protein Required:  not meeting needs  Fluid Required: meeting needs  Tolerance: tolerating  % Intake of Estimated Energy Needs: 25 - 50 %  % Meal Intake: 25 - 50 %    Intake/Output Summary (Last 24 hours) at 12/26/2019 1251  Last data filed at 12/26/2019 1101  Gross per 24 hour   Intake 730 ml   Output 1920 ml   Net -1190 ml      Nutrition Risk    Level of Risk/Frequency of Follow-up: high     Dietitian Rounds Brief:  · Seen 2' ICU status. Admitted for Influenza B. Still has fevere, therefore is not being discharged. Moving out of ICU to Med/surg. Appetite and intake ok. Mother stated poor intake due to hospital food. Offere ONS, wife and patient accepted. Added Ensure Enlive and Ensure pudding (BMI 18.23). Mother stated no decrease in appetite or intake prior to admit. No weight loss. No issues with N/V/D. Will continue to monitor.    Monitor and Evaluation    Food and Nutrient Intake: energy intake, food and beverage intake  Food and Nutrient Adminstration: diet order  Physical Activity and Function: nutrition-related ADLs and IADLs  Anthropometric Measurements: weight, weight change  Biochemical Data, Medical Tests and Procedures: electrolyte and renal panel, gastrointestinal profile, glucose/endocrine profile, inflammatory profile, lipid profile  Nutrition-Focused Physical Findings: overall appearance     Nutrition Follow-Up    RD Follow-up?: Yes  Mya Walton RD 12/26/2019 12:55 PM

## 2019-12-26 NOTE — PROGRESS NOTES
[facility]  Neurology  Progress Note    Patient Name: Gonsalo Mei III  MRN: [id number]  Admission Date: 12/23/2019  Hospital Length of Stay: 3 days  Code Status: Full Code   Attending Provider: Jamaal Mosley MD  Primary Care Physician: Alex Charles MD   Principal Problem:Status epilepticus    Subjective:     Interval History: Patient seen and examined in room this morning, mother at bedside. Patient had fever overnight. She was upset about patient's Aptiom being increased to 400 mg. Explained to patient that fever and acute illness can decrease a patient's seizure threshold, leading to an increase in seizures. After explanation, patient's mother is agreeable to increase in Aptiom.     Patient seen and examined with Dr. Arjun Pinzon.      Current Medications:     Current Facility-Administered Medications   Medication Dose Route Frequency Provider Last Rate Last Dose    acetaminophen tablet 650 mg  650 mg Per OG tube Q6H PRN Sia March, DO   650 mg at 12/24/19 2024    calcium gluconate 1g in normal saline 0.9 % 100mL (ready to mix system)  1 g Intravenous PRN Sia March, DO        calcium gluconate 1g in normal saline 0.9 % 100mL (ready to mix system)  1 g Intravenous PRN Sia CONNELL. Anca, DO        calcium gluconate 1g in normal saline 0.9 % 100mL (ready to mix system)  1 g Intravenous PRN Sia March, DO        chlorhexidine 0.12 % solution 15 mL  15 mL Mouth/Throat BID Phoebe Bridges PharmD   15 mL at 12/25/19 2050    clorazepate tablet 7.5 mg  7.5 mg Per OG tube BID Sia March, DO   7.5 mg at 12/25/19 2050    eslicarbazepine Tab 400 mg  400 mg Per NG/OG Tube Daily Tammy Hyde NP        eslicarbazepine Tab 800 mg  800 mg Per NG/OG Tube QHS Sia March, DO   800 mg at 12/25/19 2053    levETIRAcetam tablet 250 mg  250 mg Oral BID Tammy Hyde NP   250 mg at 12/25/19 2050    lorazepam injection 2 mg  2 mg Intravenous Q15 Min PRN Sia March, DO   2 mg at  12/24/19 0541    lorazepam injection 2 mg  2 mg Intravenous Q4H PRN Sia A. March, DO   2 mg at 12/24/19 0252    lorazepam injection 4 mg  4 mg Intravenous Once Sia A. March, DO        magnesium sulfate 2g in water 50mL IVPB (premix)  2 g Intravenous PRN Sia A. March, DO        magnesium sulfate 2g in water 50mL IVPB (premix)  4 g Intravenous PRN Sia A. March, DO   4 g at 12/24/19 0304    mupirocin 2 % ointment   Nasal BID Phoebe Bridges PharmD        pantoprazole injection 40 mg  40 mg Intravenous Daily Sia A. March, DO   40 mg at 12/25/19 0954    potassium chloride 10 mEq in 100 mL IVPB  40 mEq Intravenous PRN Sia A. March, DO        And    potassium chloride 10 mEq in 100 mL IVPB  60 mEq Intravenous PRN Sia A. March,  mL/hr at 12/24/19 0305 60 mEq at 12/24/19 0305    propofol (DIPRIVAN) 10 mg/mL infusion  20 mcg/kg/min Intravenous Continuous Sia A. March, DO 3.3 mL/hr at 12/24/19 0832 10 mcg/kg/min at 12/24/19 0832    risperiDONE tablet 1 mg  1 mg Per OG tube Daily Sia KO. March, DO   1 mg at 12/25/19 1905    sodium phosphate 15 mmol in dextrose 5 % 250 mL IVPB  15 mmol Intravenous PRN Sia A. March, DO        sodium phosphate 20.01 mmol in dextrose 5 % 250 mL IVPB  20.01 mmol Intravenous PRN Sia A. March, DO        sodium phosphate 30 mmol in dextrose 5 % 250 mL IVPB  30 mmol Intravenous PRN Sia A. March, DO        topiramate CSpX 200 mg  200 mg Per OG tube Daily Sia A. March, DO   200 mg at 12/25/19 0955    topiramate CSpX 300 mg  300 mg Per OG tube QHS Sia A. March, DO   300 mg at 12/25/19 2052       Review of Systems   As per HPI  Objective:     Vital Signs (Most Recent):  Temp: 97.7 °F (36.5 °C) (12/26/19 0701)  Pulse: 64 (12/26/19 0720)  Resp: (!) 21 (12/26/19 0720)  BP: 114/67 (12/26/19 0701)  SpO2: 100 % (12/26/19 0720) Vital Signs (24h Range):  Temp:  [97.7 °F (36.5 °C)-100.6 °F (38.1 °C)] 97.7 °F (36.5 °C)  Pulse:  [] 64  Resp:  [19-42] 21  SpO2:   [93 %-100 %] 100 %  BP: ()/(51-69) 114/67     Weight: 49.7 kg (109 lb 9.1 oz)  Body mass index is 18.23 kg/m².    Physical Exam      Constitutional: He is oriented to person, place, and time. He appears well-developed and well-nourished.   HENT:   Head: Normocephalic and atraumatic.   Neck: Normal range of motion. Neck supple.   Cardiovascular: Normal rate.   Pulmonary/Chest: Effort normal.   Abdominal: Soft.   Musculoskeletal: Normal range of motion.   Neurological: He is alert and oriented to person, place, and time. He displays normal reflexes. No cranial nerve deficit or sensory deficit. He exhibits normal muscle tone. He has an abnormal Finger-Nose-Finger Test. Coordination normal.   Reflex Scores:       Tricep reflexes are 2+ on the right side and 2+ on the left side.       Bicep reflexes are 2+ on the right side and 2+ on the left side.       Brachioradialis reflexes are 2+ on the right side and 2+ on the left side.       Patellar reflexes are 2+ on the right side and 2+ on the left side.       Achilles reflexes are 2+ on the right side and 2+ on the left side.  Skin: Skin is warm and dry. Capillary refill takes less than 2 seconds.   Psychiatric: He has a normal mood and affect.         NEUROLOGICAL EXAMINATION:      MENTAL STATUS   Oriented to person, place, and time.   Level of consciousness: alert     CRANIAL NERVES   Cranial nerves II through XII intact.       SENSORY EXAM   Light touch normal.      GAIT AND COORDINATION      Coordination   Finger to nose coordination: abnormal       Significant Labs:   Hemoglobin A1c:   Recent Labs   Lab 12/24/19  1505   HGBA1C 4.6     CBC:   Recent Labs   Lab 12/25/19 0454 12/26/19  0400   WBC 15.37* 7.95  7.95   HGB 15.7 16.0  16.0   HCT 46.8 45.8  45.8   PLT 81* 80*  80*     CMP:   Recent Labs   Lab 12/25/19 0454 12/26/19  0400   GLU 86 99  99    137  137   K 4.5 3.4*  3.4*    108  108   CO2 24 21*  21*   BUN 10 13  13   CREATININE 0.9  0.7  0.7   CALCIUM 8.5* 8.7  8.7   MG 1.9 1.7   PROT 6.6 6.6   ALBUMIN 3.6 3.6   BILITOT 1.1* 1.2*   ALKPHOS 61 58   AST 76* 66*   ALT 25 25   ANIONGAP 8 8  8   EGFRNONAA >60.0 >60.0  >60.0     All pertinent lab results from the past 24 hours have been reviewed.    Significant Imaging: I have reviewed all pertinent imaging results/findings within the past 24 hours.    Assessment and Plan:  1.Status Epilepticus - Now at baseline  -EEG showed mild to moderate encephalopathy. No Seizures  -Patient is at his baseline per mom  -Continue home AEDs-DC keppra today; increase Aptiom to 400 mg in am, evening dose to remain the same and patient to f/u with his neurologist o/p for further recommendations   -Seizure precautions  -Check AED levels Topiramate level oxcarbazepine level, Keppra level-pending     2. Encephalopathy    - Encephalopathy labs, ammonia was elevated 12/24  -EEG showed mild to moderate encephalopathy. No Seizures        3.History of seizures  -Continue Home AED meds-  -Seizures precautions      4.Fever  -Managed per IM   -fever overnight; ID managing      5. Influenza Type B Positive   -Managed per IM      Patient to follow up with [facility] at [phone] within 2 weeks from discharge.     Active Diagnoses:    Diagnosis Date Noted POA    Fever [R50.9] 12/24/2019 Yes    Lactic acidosis [E87.2] 12/24/2019 Yes    Influenza B [J10.1] 12/23/2019 Yes      Problems Resolved During this Admission:    Diagnosis Date Noted Date Resolved POA    PRINCIPAL PROBLEM:  Status epilepticus [G40.901] 12/23/2019 12/25/2019 Yes    Hypokalemia [E87.6] 12/24/2019 12/25/2019 Yes       VTE Risk Mitigation (From admission, onward)         Ordered     IP VTE LOW RISK PATIENT  Once      12/23/19 2040                Tanika Victor DNP  Neurology  [facility]    I, Dr. Arjun Pinzon, have personally seen and examined the patient with my advanced provider and agree with above. I personally did a focused  exam, and reviewed all necessary clinical information. I discussed my management plan with my NP and agree with above. Stable. Rationale of treatment discussed with the mother and patient.

## 2019-12-27 VITALS
DIASTOLIC BLOOD PRESSURE: 52 MMHG | BODY MASS INDEX: 18.26 KG/M2 | RESPIRATION RATE: 16 BRPM | TEMPERATURE: 98 F | SYSTOLIC BLOOD PRESSURE: 91 MMHG | HEART RATE: 60 BPM | WEIGHT: 109.56 LBS | OXYGEN SATURATION: 98 % | HEIGHT: 65 IN

## 2019-12-27 PROBLEM — R50.9 FEVER: Status: RESOLVED | Noted: 2019-12-24 | Resolved: 2019-12-27

## 2019-12-27 PROBLEM — J10.1 INFLUENZA B: Status: RESOLVED | Noted: 2019-12-23 | Resolved: 2019-12-27

## 2019-12-27 PROBLEM — G40.901 STATUS EPILEPTICUS: Status: RESOLVED | Noted: 2019-12-26 | Resolved: 2019-12-27

## 2019-12-27 LAB
ALBUMIN SERPL BCP-MCNC: 3.7 G/DL (ref 3.5–5.2)
ALP SERPL-CCNC: 58 U/L (ref 55–135)
ALT SERPL W/O P-5'-P-CCNC: 43 U/L (ref 10–44)
ANION GAP SERPL CALC-SCNC: 8 MMOL/L (ref 8–16)
ANION GAP SERPL CALC-SCNC: 8 MMOL/L (ref 8–16)
AST SERPL-CCNC: 79 U/L (ref 10–40)
BASOPHILS # BLD AUTO: 0.01 K/UL (ref 0–0.2)
BASOPHILS # BLD AUTO: 0.01 K/UL (ref 0–0.2)
BASOPHILS NFR BLD: 0.1 % (ref 0–1.9)
BASOPHILS NFR BLD: 0.1 % (ref 0–1.9)
BILIRUB SERPL-MCNC: 1.3 MG/DL (ref 0.1–1)
BUN SERPL-MCNC: 17 MG/DL (ref 6–20)
BUN SERPL-MCNC: 17 MG/DL (ref 6–20)
CALCIUM SERPL-MCNC: 9.2 MG/DL (ref 8.7–10.5)
CALCIUM SERPL-MCNC: 9.2 MG/DL (ref 8.7–10.5)
CHLORIDE SERPL-SCNC: 110 MMOL/L (ref 95–110)
CHLORIDE SERPL-SCNC: 110 MMOL/L (ref 95–110)
CO2 SERPL-SCNC: 21 MMOL/L (ref 23–29)
CO2 SERPL-SCNC: 21 MMOL/L (ref 23–29)
CREAT SERPL-MCNC: 0.8 MG/DL (ref 0.5–1.4)
CREAT SERPL-MCNC: 0.8 MG/DL (ref 0.5–1.4)
DIFFERENTIAL METHOD: ABNORMAL
DIFFERENTIAL METHOD: ABNORMAL
EOSINOPHIL # BLD AUTO: 0.2 K/UL (ref 0–0.5)
EOSINOPHIL # BLD AUTO: 0.2 K/UL (ref 0–0.5)
EOSINOPHIL NFR BLD: 2.5 % (ref 0–8)
EOSINOPHIL NFR BLD: 2.5 % (ref 0–8)
ERYTHROCYTE [DISTWIDTH] IN BLOOD BY AUTOMATED COUNT: 11.5 % (ref 11.5–14.5)
ERYTHROCYTE [DISTWIDTH] IN BLOOD BY AUTOMATED COUNT: 11.5 % (ref 11.5–14.5)
EST. GFR  (AFRICAN AMERICAN): >60 ML/MIN/1.73 M^2
EST. GFR  (AFRICAN AMERICAN): >60 ML/MIN/1.73 M^2
EST. GFR  (NON AFRICAN AMERICAN): >60 ML/MIN/1.73 M^2
EST. GFR  (NON AFRICAN AMERICAN): >60 ML/MIN/1.73 M^2
GLUCOSE SERPL-MCNC: 93 MG/DL (ref 70–110)
GLUCOSE SERPL-MCNC: 93 MG/DL (ref 70–110)
HCT VFR BLD AUTO: 44.8 % (ref 40–54)
HCT VFR BLD AUTO: 44.8 % (ref 40–54)
HGB BLD-MCNC: 15.5 G/DL (ref 14–18)
HGB BLD-MCNC: 15.5 G/DL (ref 14–18)
IMM GRANULOCYTES # BLD AUTO: 0.02 K/UL (ref 0–0.04)
IMM GRANULOCYTES # BLD AUTO: 0.02 K/UL (ref 0–0.04)
IMM GRANULOCYTES NFR BLD AUTO: 0.3 % (ref 0–0.5)
IMM GRANULOCYTES NFR BLD AUTO: 0.3 % (ref 0–0.5)
LYMPHOCYTES # BLD AUTO: 2.3 K/UL (ref 1–4.8)
LYMPHOCYTES # BLD AUTO: 2.3 K/UL (ref 1–4.8)
LYMPHOCYTES NFR BLD: 29.8 % (ref 18–48)
LYMPHOCYTES NFR BLD: 29.8 % (ref 18–48)
MAGNESIUM SERPL-MCNC: 1.7 MG/DL (ref 1.6–2.6)
MCH RBC QN AUTO: 32.4 PG (ref 27–31)
MCH RBC QN AUTO: 32.4 PG (ref 27–31)
MCHC RBC AUTO-ENTMCNC: 34.6 G/DL (ref 32–36)
MCHC RBC AUTO-ENTMCNC: 34.6 G/DL (ref 32–36)
MCV RBC AUTO: 94 FL (ref 82–98)
MCV RBC AUTO: 94 FL (ref 82–98)
MONOCYTES # BLD AUTO: 0.7 K/UL (ref 0.3–1)
MONOCYTES # BLD AUTO: 0.7 K/UL (ref 0.3–1)
MONOCYTES NFR BLD: 9.4 % (ref 4–15)
MONOCYTES NFR BLD: 9.4 % (ref 4–15)
NEUTROPHILS # BLD AUTO: 4.4 K/UL (ref 1.8–7.7)
NEUTROPHILS # BLD AUTO: 4.4 K/UL (ref 1.8–7.7)
NEUTROPHILS NFR BLD: 57.9 % (ref 38–73)
NEUTROPHILS NFR BLD: 57.9 % (ref 38–73)
NRBC BLD-RTO: 0 /100 WBC
NRBC BLD-RTO: 0 /100 WBC
PHOSPHATE SERPL-MCNC: 3.6 MG/DL (ref 2.7–4.5)
PLATELET # BLD AUTO: 104 K/UL (ref 150–350)
PLATELET # BLD AUTO: 104 K/UL (ref 150–350)
PMV BLD AUTO: 11.3 FL (ref 9.2–12.9)
PMV BLD AUTO: 11.3 FL (ref 9.2–12.9)
POTASSIUM SERPL-SCNC: 3.7 MMOL/L (ref 3.5–5.1)
POTASSIUM SERPL-SCNC: 3.7 MMOL/L (ref 3.5–5.1)
PROT SERPL-MCNC: 7 G/DL (ref 6–8.4)
RBC # BLD AUTO: 4.78 M/UL (ref 4.6–6.2)
RBC # BLD AUTO: 4.78 M/UL (ref 4.6–6.2)
SODIUM SERPL-SCNC: 139 MMOL/L (ref 136–145)
SODIUM SERPL-SCNC: 139 MMOL/L (ref 136–145)
WBC # BLD AUTO: 7.54 K/UL (ref 3.9–12.7)
WBC # BLD AUTO: 7.54 K/UL (ref 3.9–12.7)

## 2019-12-27 PROCEDURE — 85025 COMPLETE CBC W/AUTO DIFF WBC: CPT

## 2019-12-27 PROCEDURE — 80053 COMPREHEN METABOLIC PANEL: CPT

## 2019-12-27 PROCEDURE — 36415 COLL VENOUS BLD VENIPUNCTURE: CPT

## 2019-12-27 PROCEDURE — 84100 ASSAY OF PHOSPHORUS: CPT

## 2019-12-27 PROCEDURE — 83735 ASSAY OF MAGNESIUM: CPT

## 2019-12-27 RX ORDER — LEVETIRACETAM 250 MG/1
250 TABLET ORAL 2 TIMES DAILY
Qty: 60 TABLET | Refills: 11 | Status: SHIPPED | OUTPATIENT
Start: 2019-12-27 | End: 2023-11-16

## 2019-12-27 NOTE — DISCHARGE SUMMARY
[facility] Medicine  Discharge Summary      Patient Name: Gonsalo Mei III  MRN: [id number]  Admission Date: 12/23/2019  Hospital Length of Stay: 4 days  Discharge Date and Time:  12/27/2019 7:57 AM  Attending Physician: Jamaal Mosley MD   Discharging Provider: Jamaal Mosley MD  Primary Care Provider: Alex Charles MD      HPI:   23 yo AAM with PMH of Epilepsy presented to ED for seizures. Unable to obtain history from patient (intubated/sedated)    Spoke with ER physicain, Dr. Pink who states that patient was BIBA and that he received versed 5mg at home by mother and another 5mg versed by EMS. He was actively seizing upon ED arrival. He was given 4 mg IV Ativan and intubated for Airway protection.  Neurology consulted and recommended stat EEG, Keppra 3g load and c/w home medications.    Per patient's mother, patient has complained for flu like symptoms (runny nose, congestion, fatigue) over the past day. She states that she witnessed the seizure which was like his usual seizure activity (left sided shaking movements with eye deviation to left). She states that she usually gives him Versed 5 mg and seizures resolve, however this time it did not and she called EMS. She states that he had seizures in the past when he had the flu. Last seizure in early spring. Up to date on immunizations including flu shot. compliant with all medications.       * No surgery found *      Hospital Course:   12/24 Remains sedated and intubated, though propofol is currently off for EEG.  No seizure activity on EEG as per Dr. Arjun Pinzon. ID attendance noted and appreciated. Tmax 103 F. Labs personally reviewed: leukocytosis has resolved.Has developed hypokalemia (will be covered). Lactate = 3.8. Telemetry personally reviewed = sinus  12/25 Extubated. Mother and caregiver state patient is back to baseline. Has been apyrexic since yesterday. Patient denies pain. Is hungry. Will watch in ICU through-out day.  "If remains apyrexic and no further seizure, will transfer out of ICU and hopefully discharge in AM if no further fever/seizure. Labs personally reviewed: leukocytosis persisting; hypokalemia resolved. Telemetry personally reviewed: sinus.  12/26 100.6 temp last PM. Discussed patient personally with Dr. Lopez, to keep til apyrexic =  24 hrs. Discussed patient personally with Dr. Arjun Pinzon: is cleared from Neurology for discharge. Patient has yet to work with PT. Mother and caregiver at bedside. No new complaints. Indicates he feels "OK". Labs personally reviewed leukocytosis has resolved. Has mild hypokalemia, which is being covered. Telemetry personally reviewed = sinus. Will discontinue Rice and transfer to med/jl without telemetry; continue PT  12/27 Apyrexic for > 24 hours. No further seizure activity. Is stable for discharge with outpatient follow-up  VSS  Lungs: clear  Heart: S1S2  Abdo: soft       Consults:   Consults (From admission, onward)        Status Ordering Provider     Inpatient consult to Hospitalist  Once     Provider:  DO Franco Harris PENNY A.     Inpatient consult to Infectious Diseases  Once     Provider:  Miriam Lopez MD    Completed WARREN ROACH     Inpatient consult to neurology  Once     Provider:  Alejo Pinzon MD    Completed FRANCIS GRANDE     Pulmonology  Once     Provider:  Estelle Thompson MD    Completed FRANCIS GRANDE          No new Assessment & Plan notes have been filed under this hospital service since the last note was generated.  Service: Hospital Medicine    Final Active Diagnoses:      Problems Resolved During this Admission:    Diagnosis Date Noted Date Resolved POA    PRINCIPAL PROBLEM:  Status epilepticus [G40.901] 12/23/2019 12/25/2019 Yes    Status epilepticus [G40.901] 12/26/2019 12/27/2019 Yes    Fever [R50.9] 12/24/2019 12/27/2019 Yes    Hypokalemia [E87.6] 12/24/2019 12/25/2019 Yes    Lactic acidosis [E87.2] " 12/24/2019 12/26/2019 Yes    Influenza B [J10.1] 12/23/2019 12/27/2019 Yes       Discharged Condition: good    Disposition: Home or Self Care    Follow Up:  Follow-up Information     Alejo Pinzon MD In 2 weeks.    Specialties:  Vascular Neurology, Neurology  Contact information:  [address]  [address]  [place] [address]  [phone]                 Patient Instructions:      Diet Adult Regular     Activity as tolerated       Significant Diagnostic Studies: Labs:   BMP:   Recent Labs   Lab 12/26/19  0400 12/27/19  0443   GLU 99  99 93  93     137 139  139   K 3.4*  3.4* 3.7  3.7     108 110  110   CO2 21*  21* 21*  21*   BUN 13  13 17  17   CREATININE 0.7  0.7 0.8  0.8   CALCIUM 8.7  8.7 9.2  9.2   MG 1.7 1.7    and CBC   Recent Labs   Lab 12/26/19  0400 12/27/19  0443   WBC 7.95  7.95 7.54  7.54   HGB 16.0  16.0 15.5  15.5   HCT 45.8  45.8 44.8  44.8   PLT 80*  80* 104*  104*       Pending Diagnostic Studies:     Procedure Component Value Units Date/Time    Levetiracetam level [[id number]] Collected:  12/24/19 1505    Order Status:  Sent Lab Status:  In process Updated:  12/24/19 1511    Specimen:  Blood     Narrative:       Collection has been rescheduled by CMG at 12/24/2019 13:31 Reason:   Unable to collect    Oxcarbazepine level [[id number]] Collected:  12/24/19 1505    Order Status:  Sent Lab Status:  In process Updated:  12/24/19 1511    Specimen:  Blood     Narrative:       Collection has been rescheduled by CMG at 12/24/2019 13:31 Reason:   Unable to collect    Topiramate level [[id number]] Collected:  12/24/19 1505    Order Status:  Sent Lab Status:  In process Updated:  12/24/19 1511    Specimen:  Blood     Narrative:       Collection has been rescheduled by CMG at 12/24/2019 13:31 Reason:   Unable to collect    Vitamin B1 [[id number]] Collected:  12/24/19 1505    Order Status:  Sent Lab Status:  In process Updated:  12/24/19 1511    Specimen:  Blood     Narrative:        Collection has been rescheduled by MERRICK at 12/24/2019 13:31 Reason:   Unable to collect    Vitamin B6 [[id number]] Collected:  12/25/19 0454    Order Status:  Sent Lab Status:  In process Updated:  12/25/19 0507    Specimen:  Blood     Narrative:       Collection has been rescheduled by RENEA at 12/25/2019 04:27 Reason:   Unable to collect         Medications:  Reconciled Home Medications:      Medication List      START taking these medications    levETIRAcetam 250 MG Tab  Commonly known as:  KEPPRA  Take 1 tablet (250 mg total) by mouth 2 (two) times daily.        CHANGE how you take these medications    * eslicarbazepine 200 mg Tab  Commonly known as:  Aptiom  Take 2 tablets (400 mg total) by mouth once daily.  What changed:  how much to take     * Aptiom 800 mg Tab  Generic drug:  eslicarbazepine  Take 800 mg by mouth every evening.  What changed:  Another medication with the same name was changed. Make sure you understand how and when to take each.         * This list has 2 medication(s) that are the same as other medications prescribed for you. Read the directions carefully, and ask your doctor or other care provider to review them with you.            CONTINUE taking these medications    clorazepate 3.75 MG Tab  Commonly known as:  TRANXENE  Take 7.5 mg by mouth 2 (two) times daily.     diazePAM 2.5 mg Kit  Commonly known as:  DIASTAT  Place 20 mg rectally.     lamoTRIgine 100 MG tablet  Commonly known as:  LAMICTAL  Take 300 mg by mouth once daily.     lisdexamfetamine 30 MG capsule  Commonly known as:  VYVANSE  Take 30 mg by mouth every morning.     * phenytoin 50 mg chewable tablet  Commonly known as:  DILANTIN  Take 50 mg by mouth before breakfast.     * phenytoin 50 mg chewable tablet  Commonly known as:  DILANTIN  Take 75 mg by mouth every evening.     * Qudexy  mg Cspx  Generic drug:  topiramate  Take 300 mg by mouth every evening.     * Qudexy  mg Cspx  Generic drug:  topiramate  Take  200 mg by mouth once daily.     risperiDONE 1 MG Tbdl  Commonly known as:  RISPERDAL M-TABS  Take 1 mg by mouth once daily.     Vimpat 200 mg Tab tablet  Generic drug:  lacosamide  Take by mouth 2 (two) times daily.         * This list has 4 medication(s) that are the same as other medications prescribed for you. Read the directions carefully, and ask your doctor or other care provider to review them with you.                Indwelling Lines/Drains at time of discharge:   Lines/Drains/Airways     None                 Time spent on the discharge of patient: 32 minutes  Patient was seen and examined on the date of discharge and determined to be suitable for discharge.         Jamaal Mosley MD  Department of Hospital Medicine  [facility]

## 2019-12-28 LAB
BACTERIA BLD CULT: NORMAL
BACTERIA BLD CULT: NORMAL

## 2019-12-29 LAB
LEVETIRACETAM SERPL-MCNC: 12.3 UG/ML (ref 10–40)
OXCARBAZEPINE SERPL-MCNC: 14 UG/ML (ref 10–35)
VIT B1 BLD-SCNC: 123.2 NMOL/L (ref 66.5–200)

## 2019-12-30 LAB — TOPIRAMATE SERPL-MCNC: 11.8 UG/ML (ref 2–25)

## 2019-12-31 LAB — VIT B6 SERPL-MCNC: 1.6 UG/L (ref 5.3–46.7)

## 2021-05-10 ENCOUNTER — PATIENT MESSAGE (OUTPATIENT)
Dept: RESEARCH | Facility: HOSPITAL | Age: 26
End: 2021-05-10

## 2021-06-18 ENCOUNTER — OFFICE VISIT (OUTPATIENT)
Dept: URGENT CARE | Facility: CLINIC | Age: 26
End: 2021-06-18
Payer: MEDICAID

## 2021-06-18 DIAGNOSIS — Z20.822 COVID-19 VIRUS NOT DETECTED: Primary | ICD-10-CM

## 2021-06-18 DIAGNOSIS — Z20.822 ENCOUNTER FOR LABORATORY TESTING FOR COVID-19 VIRUS: ICD-10-CM

## 2021-06-18 LAB
CTP QC/QA: YES
SARS-COV-2 RDRP RESP QL NAA+PROBE: NEGATIVE

## 2021-06-18 PROCEDURE — 87635 PR SARS-COV-2 COVID-19 AMPLIFIED PROBE: ICD-10-PCS | Mod: QW,S$GLB,, | Performed by: NURSE PRACTITIONER

## 2021-06-18 PROCEDURE — 87635 SARS-COV-2 COVID-19 AMP PRB: CPT | Mod: QW,S$GLB,, | Performed by: NURSE PRACTITIONER

## 2021-06-18 PROCEDURE — 99203 PR OFFICE/OUTPT VISIT, NEW, LEVL III, 30-44 MIN: ICD-10-PCS | Mod: S$GLB,,, | Performed by: EMERGENCY MEDICINE

## 2021-06-18 PROCEDURE — 99203 OFFICE O/P NEW LOW 30 MIN: CPT | Mod: S$GLB,,, | Performed by: EMERGENCY MEDICINE

## 2022-01-09 ENCOUNTER — LAB VISIT (OUTPATIENT)
Dept: PRIMARY CARE CLINIC | Facility: OTHER | Age: 27
End: 2022-01-09
Attending: INTERNAL MEDICINE
Payer: MEDICAID

## 2022-01-09 DIAGNOSIS — Z20.822 ENCOUNTER FOR LABORATORY TESTING FOR COVID-19 VIRUS: ICD-10-CM

## 2022-01-09 PROCEDURE — U0003 INFECTIOUS AGENT DETECTION BY NUCLEIC ACID (DNA OR RNA); SEVERE ACUTE RESPIRATORY SYNDROME CORONAVIRUS 2 (SARS-COV-2) (CORONAVIRUS DISEASE [COVID-19]), AMPLIFIED PROBE TECHNIQUE, MAKING USE OF HIGH THROUGHPUT TECHNOLOGIES AS DESCRIBED BY CMS-2020-01-R: HCPCS | Performed by: INTERNAL MEDICINE

## 2022-01-12 LAB — SARS-COV-2 RNA RESP QL NAA+PROBE: NOT DETECTED

## 2022-02-26 ENCOUNTER — HOSPITAL ENCOUNTER (EMERGENCY)
Facility: HOSPITAL | Age: 27
Discharge: HOME | End: 2022-02-26
Attending: EMERGENCY MEDICINE
Payer: MEDICAID

## 2022-02-26 VITALS
WEIGHT: 107 LBS | DIASTOLIC BLOOD PRESSURE: 77 MMHG | BODY MASS INDEX: 18.27 KG/M2 | RESPIRATION RATE: 18 BRPM | OXYGEN SATURATION: 98 % | SYSTOLIC BLOOD PRESSURE: 126 MMHG | HEART RATE: 97 BPM | HEIGHT: 64 IN | TEMPERATURE: 97 F

## 2022-02-26 DIAGNOSIS — G40.909 RECURRENT SEIZURES: Primary | ICD-10-CM

## 2022-02-26 LAB
ANION GAP SERPL CALC-SCNC: 10 MMOL/L (ref 8–16)
BUN SERPL-MCNC: 10 MG/DL (ref 6–20)
CALCIUM SERPL-MCNC: 9.2 MG/DL (ref 8.7–10.5)
CHLORIDE SERPL-SCNC: 110 MMOL/L (ref 95–110)
CO2 SERPL-SCNC: 22 MMOL/L (ref 23–29)
CREAT SERPL-MCNC: 1 MG/DL (ref 0.5–1.4)
EST. GFR  (AFRICAN AMERICAN): >60 ML/MIN/1.73 M^2
EST. GFR  (NON AFRICAN AMERICAN): >60 ML/MIN/1.73 M^2
GLUCOSE SERPL-MCNC: 98 MG/DL (ref 70–110)
POTASSIUM SERPL-SCNC: 3.6 MMOL/L (ref 3.5–5.1)
SODIUM SERPL-SCNC: 142 MMOL/L (ref 136–145)

## 2022-02-26 PROCEDURE — 80048 BASIC METABOLIC PNL TOTAL CA: CPT | Performed by: EMERGENCY MEDICINE

## 2022-02-26 PROCEDURE — 36415 COLL VENOUS BLD VENIPUNCTURE: CPT | Performed by: EMERGENCY MEDICINE

## 2022-02-26 PROCEDURE — 99283 EMERGENCY DEPT VISIT LOW MDM: CPT | Mod: 25

## 2022-02-26 PROCEDURE — 25000003 PHARM REV CODE 250: Performed by: EMERGENCY MEDICINE

## 2022-02-26 RX ORDER — ACETAMINOPHEN 325 MG/1
650 TABLET ORAL
Status: COMPLETED | OUTPATIENT
Start: 2022-02-26 | End: 2022-02-26

## 2022-02-26 RX ADMIN — ACETAMINOPHEN 650 MG: 325 TABLET ORAL at 09:02

## 2022-02-27 NOTE — ED PROVIDER NOTES
Encounter Date: 2/26/2022    SCRIBE #1 NOTE: I, Michelle Dickens, am scribing for, and in the presence of, Tone Muller MD.       History     Chief Complaint   Patient presents with    Seizures     Patient had a seizures about 30 minutes ago. PER MOTHER IT LASTED 16 MINS AND GAVE SHE GAVE PATIENT VERSED 10 MG INTRANASAL AND EMS GAVE 5 MG FOR A TOTAL OF 15 MG.     Time seen by provider: 7:00 PM on 02/26/2022    Gonsalo Mei III is a 26 y.o. male who presents to the ED with an onset of seizures. Patient's mother reports patient's seizing lasted 18 minutes. She gave patient 10 mg versed. Patient's mother states she generally calls EMS if the seizure lasts more than 20 minutes despite medication. EMS personnel administered 5 mg of versed upon arrival. Patient reportedly hasn't missed any doses of medication. He has a developmental delay. PMHx of epilepsy and cerebellar atrophy. No pertinent PSHx. Patient is not a smoker.     The history is provided by a relative (mother).     Review of patient's allergies indicates:   Allergen Reactions    Tamiflu [oseltamivir] Other (See Comments)     seizures     Past Medical History:   Diagnosis Date    Cerebellar atrophy     by MRI    Epilepsy      Past Surgical History:   Procedure Laterality Date    TONSILLECTOMY      vagal nerve stimulator (2004) Left 2004     Family History   Problem Relation Age of Onset    Asthma Mother     Diabetes Paternal Aunt     Heart disease Paternal Aunt     Hypertension Paternal Aunt     Diabetes Maternal Grandmother     Early death Maternal Grandmother     Heart disease Maternal Grandmother     Hypertension Maternal Grandmother     Stroke Maternal Grandmother     Cancer Maternal Grandmother     Heart disease Maternal Grandfather     Hypertension Maternal Grandfather      Social History     Tobacco Use    Smoking status: Never Smoker   Substance Use Topics    Alcohol use: Never    Drug use: Never     Review of Systems    Constitutional: Negative for chills and fever.   HENT: Negative for nosebleeds.    Eyes: Negative for visual disturbance.   Respiratory: Negative for cough and shortness of breath.    Cardiovascular: Negative for chest pain and palpitations.   Gastrointestinal: Negative for abdominal pain, diarrhea, nausea and vomiting.   Genitourinary: Negative for dysuria and hematuria.   Musculoskeletal: Negative for back pain and neck pain.   Skin: Negative for rash.   Neurological: Positive for seizures. Negative for syncope and headaches.       Physical Exam     Initial Vitals [02/26/22 1843]   BP Pulse Resp Temp SpO2   135/86 (!) 117 20 98.9 °F (37.2 °C) 97 %      MAP       --         Physical Exam    Nursing note and vitals reviewed.  Constitutional: He appears well-developed and well-nourished.  Non-toxic appearance. No distress.   Sedated. Nonverbal. Not following commands.    HENT:   Head: Normocephalic and atraumatic.   Eyes: EOM are normal. Pupils are equal, round, and reactive to light.   Neck: Neck supple. No JVD present.   Normal range of motion.  Cardiovascular: Regular rhythm, normal heart sounds and intact distal pulses. Exam reveals no gallop and no friction rub.    No murmur heard.  Mild tachycardia.   Pulmonary/Chest: Breath sounds normal. He has no wheezes. He has no rhonchi. He has no rales.   Abdominal: Abdomen is soft. Bowel sounds are normal. There is no abdominal tenderness. There is no rebound and no guarding.   Musculoskeletal:         General: Normal range of motion.      Cervical back: Normal range of motion and neck supple. No rigidity.      Comments: Moving all extremities.      Neurological: He is oriented to person, place, and time. He has normal strength and normal reflexes. No cranial nerve deficit or sensory deficit. He exhibits normal muscle tone. Coordination normal. GCS eye subscore is 4. GCS verbal subscore is 5. GCS motor subscore is 6.   Skin: Skin is warm and dry.   Psychiatric: He has  a normal mood and affect. His speech is normal and behavior is normal. He is not actively hallucinating.         ED Course   Procedures  Labs Reviewed   BASIC METABOLIC PANEL - Abnormal; Notable for the following components:       Result Value    CO2 22 (*)     All other components within normal limits          Imaging Results    None          Medications   acetaminophen tablet 650 mg (650 mg Oral Given 2/26/22 2105)     Medical Decision Making:   History:   Old Medical Records: I decided to obtain old medical records.  Initial Assessment:   26-year-old man with a history of developmental delay and epilepsy on multiple antiepileptic medications who presents emergency department for evaluation of recurrent seizure.  Valium administered by mother in by EMS with seizure abating.  Patient sedated/postictal on initial evaluation.  Laboratory results showed no evidence of grave electrolyte abnormalities or significant dehydration.  Patient has been compliant with medication as mother administers them.  On reassessment patient is neurologically back to baseline.  He has had no further seizure episodes here in the ED.  Discussed with patient's mother who states normally she takes him home.  She feels comfortable taking him home and would rather take him home at this time.  She is to follow-up with his neurologist as soon as possible.  Return precautions discussed for any recurrent seizures or any other concerns.          Scribe Attestation:   Scribe #1: I performed the above scribed service and the documentation accurately describes the services I performed. I attest to the accuracy of the note.              I, Renzo Amor, personally performed the services described in this documentation. All medical record entries made by the scribe were at my direction and in my presence.  I have reviewed the chart and agree that the record reflects my personal performance and is accurate and complete. Tone Muller,  MD.    Clinical Impression:   Final diagnoses:  [G40.909] Recurrent seizures (Primary)          ED Disposition Condition    Discharge Stable        ED Prescriptions     None        Follow-up Information     Follow up With Specialties Details Why Contact Info    Alex Charles MD Pediatric Neurology Schedule an appointment as soon as possible for a visit   [address] [facility] [address]  [phone]      [facility] Emergency Dept Emergency Medicine  As needed, If symptoms worsen [address] [place] [id number]  [phone]           Tone Muller MD  02/27/22 0004

## 2022-02-27 NOTE — ED NOTES
Pt d/c home with mother. D/C instructions reviewed with mom, verbal understanding given. Pt alert and active, answering yes/no  Questions appropriately.  IV R antecubital removed. No seizure activity noted.

## 2022-08-18 ENCOUNTER — HOSPITAL ENCOUNTER (EMERGENCY)
Facility: HOSPITAL | Age: 27
Discharge: HOME | End: 2022-08-18
Attending: EMERGENCY MEDICINE
Payer: MEDICAID

## 2022-08-18 VITALS
HEART RATE: 64 BPM | HEIGHT: 64 IN | SYSTOLIC BLOOD PRESSURE: 153 MMHG | DIASTOLIC BLOOD PRESSURE: 90 MMHG | RESPIRATION RATE: 18 BRPM | BODY MASS INDEX: 20.51 KG/M2 | OXYGEN SATURATION: 98 % | TEMPERATURE: 97 F | WEIGHT: 120.13 LBS

## 2022-08-18 DIAGNOSIS — R10.9 FLANK PAIN: ICD-10-CM

## 2022-08-18 DIAGNOSIS — N20.1 RIGHT URETERAL STONE: Primary | ICD-10-CM

## 2022-08-18 DIAGNOSIS — R11.2 NAUSEA AND VOMITING, INTRACTABILITY OF VOMITING NOT SPECIFIED, UNSPECIFIED VOMITING TYPE: ICD-10-CM

## 2022-08-18 LAB
ALBUMIN SERPL BCP-MCNC: 4.5 G/DL (ref 3.5–5.2)
ALP SERPL-CCNC: 118 U/L (ref 55–135)
ALT SERPL W/O P-5'-P-CCNC: 23 U/L (ref 10–44)
ANION GAP SERPL CALC-SCNC: 12 MMOL/L (ref 8–16)
AST SERPL-CCNC: 23 U/L (ref 10–40)
BASOPHILS # BLD AUTO: 0.04 K/UL (ref 0–0.2)
BASOPHILS NFR BLD: 0.3 % (ref 0–1.9)
BILIRUB SERPL-MCNC: 0.7 MG/DL (ref 0.1–1)
BILIRUB UR QL STRIP: NEGATIVE
BUN SERPL-MCNC: 14 MG/DL (ref 6–20)
CALCIUM SERPL-MCNC: 9.8 MG/DL (ref 8.7–10.5)
CHLORIDE SERPL-SCNC: 108 MMOL/L (ref 95–110)
CLARITY UR: CLEAR
CO2 SERPL-SCNC: 19 MMOL/L (ref 23–29)
COLOR UR: YELLOW
CREAT SERPL-MCNC: 1.4 MG/DL (ref 0.5–1.4)
DIFFERENTIAL METHOD: ABNORMAL
EOSINOPHIL # BLD AUTO: 0 K/UL (ref 0–0.5)
EOSINOPHIL NFR BLD: 0.1 % (ref 0–8)
ERYTHROCYTE [DISTWIDTH] IN BLOOD BY AUTOMATED COUNT: 11.3 % (ref 11.5–14.5)
EST. GFR  (NO RACE VARIABLE): >60 ML/MIN/1.73 M^2
GLUCOSE SERPL-MCNC: 92 MG/DL (ref 70–110)
GLUCOSE UR QL STRIP: NEGATIVE
HCT VFR BLD AUTO: 48.1 % (ref 40–54)
HGB BLD-MCNC: 16.2 G/DL (ref 14–18)
HGB UR QL STRIP: ABNORMAL
IMM GRANULOCYTES # BLD AUTO: 0.06 K/UL (ref 0–0.04)
IMM GRANULOCYTES NFR BLD AUTO: 0.4 % (ref 0–0.5)
KETONES UR QL STRIP: NEGATIVE
LEUKOCYTE ESTERASE UR QL STRIP: NEGATIVE
LIPASE SERPL-CCNC: 11 U/L (ref 4–60)
LYMPHOCYTES # BLD AUTO: 1.4 K/UL (ref 1–4.8)
LYMPHOCYTES NFR BLD: 8.6 % (ref 18–48)
MCH RBC QN AUTO: 31.8 PG (ref 27–31)
MCHC RBC AUTO-ENTMCNC: 33.7 G/DL (ref 32–36)
MCV RBC AUTO: 95 FL (ref 82–98)
MICROSCOPIC COMMENT: NORMAL
MONOCYTES # BLD AUTO: 1.6 K/UL (ref 0.3–1)
MONOCYTES NFR BLD: 9.8 % (ref 4–15)
NEUTROPHILS # BLD AUTO: 12.8 K/UL (ref 1.8–7.7)
NEUTROPHILS NFR BLD: 80.8 % (ref 38–73)
NITRITE UR QL STRIP: NEGATIVE
NRBC BLD-RTO: 0 /100 WBC
PH UR STRIP: 7 [PH] (ref 5–8)
PLATELET # BLD AUTO: 197 K/UL (ref 150–450)
PMV BLD AUTO: 10.3 FL (ref 9.2–12.9)
POTASSIUM SERPL-SCNC: 3.5 MMOL/L (ref 3.5–5.1)
PROT SERPL-MCNC: 7.9 G/DL (ref 6–8.4)
PROT UR QL STRIP: NEGATIVE
RBC # BLD AUTO: 5.09 M/UL (ref 4.6–6.2)
RBC #/AREA URNS HPF: 2 /HPF (ref 0–4)
SODIUM SERPL-SCNC: 139 MMOL/L (ref 136–145)
SP GR UR STRIP: <=1.005 (ref 1–1.03)
URN SPEC COLLECT METH UR: ABNORMAL
UROBILINOGEN UR STRIP-ACNC: NEGATIVE EU/DL
WBC # BLD AUTO: 15.76 K/UL (ref 3.9–12.7)

## 2022-08-18 PROCEDURE — 63600175 PHARM REV CODE 636 W HCPCS: Performed by: PHYSICIAN ASSISTANT

## 2022-08-18 PROCEDURE — 36415 COLL VENOUS BLD VENIPUNCTURE: CPT | Performed by: PHYSICIAN ASSISTANT

## 2022-08-18 PROCEDURE — 25500020 PHARM REV CODE 255

## 2022-08-18 PROCEDURE — 80053 COMPREHEN METABOLIC PANEL: CPT | Performed by: PHYSICIAN ASSISTANT

## 2022-08-18 PROCEDURE — 25000003 PHARM REV CODE 250: Performed by: PHYSICIAN ASSISTANT

## 2022-08-18 PROCEDURE — 81000 URINALYSIS NONAUTO W/SCOPE: CPT | Performed by: PHYSICIAN ASSISTANT

## 2022-08-18 PROCEDURE — 85025 COMPLETE CBC W/AUTO DIFF WBC: CPT | Performed by: PHYSICIAN ASSISTANT

## 2022-08-18 PROCEDURE — 99285 EMERGENCY DEPT VISIT HI MDM: CPT | Mod: 25

## 2022-08-18 PROCEDURE — 96361 HYDRATE IV INFUSION ADD-ON: CPT

## 2022-08-18 PROCEDURE — 96374 THER/PROPH/DIAG INJ IV PUSH: CPT

## 2022-08-18 PROCEDURE — 83690 ASSAY OF LIPASE: CPT | Performed by: PHYSICIAN ASSISTANT

## 2022-08-18 PROCEDURE — 96375 TX/PRO/DX INJ NEW DRUG ADDON: CPT

## 2022-08-18 RX ORDER — HYDROCODONE BITARTRATE AND ACETAMINOPHEN 5; 325 MG/1; MG/1
1 TABLET ORAL EVERY 6 HOURS PRN
Qty: 12 TABLET | Refills: 0 | Status: SHIPPED | OUTPATIENT
Start: 2022-08-18 | End: 2022-08-21

## 2022-08-18 RX ORDER — ONDANSETRON 8 MG/1
8 TABLET, ORALLY DISINTEGRATING ORAL 3 TIMES DAILY PRN
Qty: 20 TABLET | Refills: 0 | Status: SHIPPED | OUTPATIENT
Start: 2022-08-18 | End: 2023-11-16

## 2022-08-18 RX ORDER — IBUPROFEN 600 MG/1
600 TABLET ORAL EVERY 8 HOURS PRN
Qty: 20 TABLET | Refills: 0 | Status: SHIPPED | OUTPATIENT
Start: 2022-08-18 | End: 2023-11-17

## 2022-08-18 RX ORDER — ONDANSETRON 2 MG/ML
8 INJECTION INTRAMUSCULAR; INTRAVENOUS
Status: COMPLETED | OUTPATIENT
Start: 2022-08-18 | End: 2022-08-18

## 2022-08-18 RX ORDER — KETOROLAC TROMETHAMINE 30 MG/ML
15 INJECTION, SOLUTION INTRAMUSCULAR; INTRAVENOUS
Status: COMPLETED | OUTPATIENT
Start: 2022-08-18 | End: 2022-08-18

## 2022-08-18 RX ADMIN — ONDANSETRON 8 MG: 2 INJECTION INTRAMUSCULAR; INTRAVENOUS at 12:08

## 2022-08-18 RX ADMIN — KETOROLAC TROMETHAMINE 15 MG: 30 INJECTION, SOLUTION INTRAMUSCULAR; INTRAVENOUS at 12:08

## 2022-08-18 RX ADMIN — IOHEXOL 75 ML: 350 INJECTION, SOLUTION INTRAVENOUS at 11:08

## 2022-08-18 RX ADMIN — SODIUM CHLORIDE 1000 ML: 0.9 INJECTION, SOLUTION INTRAVENOUS at 12:08

## 2022-08-18 NOTE — ED NOTES
Pt to Ed with mother. Pt is MR but c/o abd pain. Mother states that pt has been c/o abd and LBP pain since yesterday. LBP has been ongoing for 2 wks. Pt had episodes of unusual green stools the were mucusy in appearence. Mother continue to state that pt abd is slightly distended or bulging.  Upon assessment, pt is laying in bed, in NAD. Bowel sounds were assessed and are active in all 4 quadrant. Will continue to monitor.

## 2022-08-18 NOTE — ED PROVIDER NOTES
Encounter Date: 8/18/2022    SCRIBE #1 NOTE: I, Guera Zavala, emelina scribing for, and in the presence of, Erlinda Borja PA-C.       History     Chief Complaint   Patient presents with    Abdominal Pain     Severe Abd cramping since last night. N/Vx1  Also c/o LBPx1 week     Time seen by provider: 10:38 AM on 08/18/2022    Gonsalo Mei III is a 26 y.o. male who presents to the ED with an onset of abdominal pain x 2 days and lower back pain x 2 weeks. Mother reports 1 episode of vomiting this morning but denies fever or any other symptoms at this time. Mother states patient has been urinating normally. No recent falls or injuries. PMHx of cerebellar atrophy and epilepsy. PSHx of vagal nerve stimulator.    The history is provided by the patient and a parent. The history is limited by the condition of the patient.     Review of patient's allergies indicates:   Allergen Reactions    Tamiflu [oseltamivir] Other (See Comments)     seizures     Past Medical History:   Diagnosis Date    Cerebellar atrophy     by MRI    Epilepsy      Past Surgical History:   Procedure Laterality Date    TONSILLECTOMY      vagal nerve stimulator (2004) Left 2004     Family History   Problem Relation Age of Onset    Asthma Mother     Diabetes Paternal Aunt     Heart disease Paternal Aunt     Hypertension Paternal Aunt     Diabetes Maternal Grandmother     Early death Maternal Grandmother     Heart disease Maternal Grandmother     Hypertension Maternal Grandmother     Stroke Maternal Grandmother     Cancer Maternal Grandmother     Heart disease Maternal Grandfather     Hypertension Maternal Grandfather      Social History     Tobacco Use    Smoking status: Never Smoker   Substance Use Topics    Alcohol use: Never    Drug use: Never     Review of Systems   Constitutional: Negative for chills and fever.   Respiratory: Negative for cough, chest tightness, shortness of breath and wheezing.    Cardiovascular: Negative for  chest pain and palpitations.   Gastrointestinal: Positive for abdominal pain, nausea and vomiting. Negative for diarrhea.   Genitourinary: Negative for difficulty urinating, dysuria, frequency and hematuria.   Musculoskeletal: Positive for back pain. Negative for arthralgias, joint swelling, myalgias, neck pain and neck stiffness.   Skin: Negative for color change, pallor, rash and wound.   Neurological: Negative for dizziness, syncope, weakness, light-headedness, numbness and headaches.   Hematological: Does not bruise/bleed easily.   Psychiatric/Behavioral: The patient is not nervous/anxious.        Physical Exam     Initial Vitals [08/18/22 1009]   BP Pulse Resp Temp SpO2   (!) 153/90 64 18 96.5 °F (35.8 °C) 98 %      MAP       --         Physical Exam    Nursing note and vitals reviewed.  Constitutional: He appears well-developed and well-nourished. He is not diaphoretic. No distress.   HENT:   Head: Normocephalic and atraumatic.   Mouth/Throat: Oropharynx is clear and moist.   Cardiovascular: Normal rate, regular rhythm, normal heart sounds and intact distal pulses. Exam reveals no gallop and no friction rub.    No murmur heard.  Pulmonary/Chest: Breath sounds normal. No respiratory distress. He has no wheezes. He has no rhonchi. He has no rales. He exhibits no tenderness.   Abdominal: Abdomen is soft. He exhibits no distension and no mass. There is abdominal tenderness.   Mild TTP noted to right sided abdomen.    Musculoskeletal:         General: No tenderness or edema. Normal range of motion.     Neurological: He is alert. He has normal strength. No sensory deficit.   Skin: Skin is warm and dry. No rash and no abscess noted. No erythema.   Psychiatric: He has a normal mood and affect.         ED Course   Procedures  Labs Reviewed   CBC W/ AUTO DIFFERENTIAL - Abnormal; Notable for the following components:       Result Value    WBC 15.76 (*)     MCH 31.8 (*)     RDW 11.3 (*)     Gran # (ANC) 12.8 (*)      Immature Grans (Abs) 0.06 (*)     Mono # 1.6 (*)     Gran % 80.8 (*)     Lymph % 8.6 (*)     All other components within normal limits   COMPREHENSIVE METABOLIC PANEL - Abnormal; Notable for the following components:    CO2 19 (*)     All other components within normal limits   URINALYSIS, REFLEX TO URINE CULTURE - Abnormal; Notable for the following components:    Specific Gravity, UA <=1.005 (*)     Occult Blood UA 2+ (*)     All other components within normal limits    Narrative:     Specimen Source->Urine   LIPASE   URINALYSIS MICROSCOPIC    Narrative:     Specimen Source->Urine          Imaging Results          CT Abdomen Pelvis With Contrast (Final result)  Result time 08/18/22 12:05:11    Final result by Alaina Vazquez MD (08/18/22 12:05:11)                 Impression:      There is a 4 mm right ureteral calculus with associated mild right hydroureteronephrosis and right perinephric and Carmela ureteral stranding.      Electronically signed by: Alaina Vazquez MD  Date:    08/18/2022  Time:    12:05             Narrative:    EXAMINATION:  CT ABDOMEN PELVIS WITH CONTRAST    CLINICAL HISTORY:  Abdominal abscess/infection suspected;    TECHNIQUE:  Low dose axial images, sagittal and coronal reformations were obtained from the lung bases to the pubic symphysis following the IV administration of 75 mL of Omnipaque 350 and the oral administration of 30 mL of Omnipaque 350.    COMPARISON:  None.    FINDINGS:  The liver, spleen, adrenal glands, pancreas and gallbladder are unremarkable.    There is a 4 mm calculus seen within the midpole of the right ureter with mild right hydroureteronephrosis and right perinephric stranding.  There is no evidence left renal calculus.    There is no evidence bowel obstruction.  Is stool is seen within the colon.  The appendix is within normal limits.    The osseous structures are unremarkable.                                 Medications   sodium chloride 0.9% bolus 1,000 mL (0 mLs  Intravenous Stopped 8/18/22 1304)   ondansetron injection 8 mg (8 mg Intravenous Given 8/18/22 1255)   iohexoL (OMNIPAQUE 350) 350 mg iodine/mL injection (75 mLs Intravenous Given 8/18/22 1155)   ketorolac injection 15 mg (15 mg Intravenous Given 8/18/22 1256)     Medical Decision Making:   History:   Old Medical Records: I decided to obtain old medical records.  Differential Diagnosis:   Acute abdomen   Renal Stone   Gastritis   Viral Syndrome   UTI   Clinical Tests:   Lab Tests: Ordered and Reviewed  Radiological Study: Reviewed and Ordered       APC / Resident Notes:   CT shows evidence for 4 mm right ureteral stone. Pt has had symptomatic improvement here in the ED and there is no evidence for infection on UA. He will be discharged home with expectant therapy and is encouraged to follow-up with Urology for re-evaluation.  Mom voices understanding and is agreeable to the plan.  She is given specific return precautions.        Scribe Attestation:   Scribe #1: I performed the above scribed service and the documentation accurately describes the services I performed. I attest to the accuracy of the note.               I, Erlinda Borja PA-C, personally performed the services described in this documentation. All medical record entries made by the scribe were at my direction and in my presence.  I have reviewed the chart and agree that the record reflects my personal performance and is accurate and complete. Erlinda Borja PA-C.  9:02 PM 08/18/2022    Clinical Impression:   Final diagnoses:  [N20.1] Right ureteral stone (Primary)  [R11.2] Nausea and vomiting, intractability of vomiting not specified, unspecified vomiting type  [R10.9] Flank pain          ED Disposition Condition    Discharge Stable        ED Prescriptions     Medication Sig Dispense Start Date End Date Auth. Provider    ondansetron (ZOFRAN-ODT) 8 MG TbDL Take 1 tablet (8 mg total) by mouth 3 (three) times daily as needed (nausea). 20 tablet  8/18/2022  Erlinda Borja PA-C    HYDROcodone-acetaminophen (NORCO) 5-325 mg per tablet Take 1 tablet by mouth every 6 (six) hours as needed for Pain. 12 tablet 8/18/2022 8/21/2022 Erlinda Borja PA-C    ibuprofen (ADVIL,MOTRIN) 600 MG tablet Take 1 tablet (600 mg total) by mouth every 8 (eight) hours as needed for Pain. 20 tablet 8/18/2022  Erlinda Borja PA-C        Follow-up Information     Follow up With Specialties Details Why Contact Info    [facility] Emergency Dept Emergency Medicine  As needed, If symptoms worsen [address] [place] [id number] [phone]           Erlinda Borja PA-C  08/18/22 0245

## 2022-08-23 ENCOUNTER — TELEPHONE (OUTPATIENT)
Dept: UROLOGY | Facility: CLINIC | Age: 27
End: 2022-08-23
Payer: MEDICAID

## 2022-08-23 NOTE — TELEPHONE ENCOUNTER
Referral received for flank pain and ureteral stone. Spoke with the pt mother and advised we do not have any Medicaid slots available at this time. Informed to call [phone] to get information on  other provider options. Pt mother verbalized understanding.

## 2022-08-30 ENCOUNTER — TELEPHONE (OUTPATIENT)
Dept: UROLOGY | Facility: CLINIC | Age: 27
End: 2022-08-30
Payer: MEDICAID

## 2022-11-09 ENCOUNTER — OFFICE VISIT (OUTPATIENT)
Dept: UROLOGY | Facility: CLINIC | Age: 27
End: 2022-11-09
Payer: MEDICAID

## 2022-11-09 VITALS
WEIGHT: 120 LBS | HEART RATE: 69 BPM | BODY MASS INDEX: 20.49 KG/M2 | DIASTOLIC BLOOD PRESSURE: 86 MMHG | SYSTOLIC BLOOD PRESSURE: 148 MMHG | HEIGHT: 64 IN

## 2022-11-09 DIAGNOSIS — N13.2 URETERAL STONE WITH HYDRONEPHROSIS: Primary | ICD-10-CM

## 2022-11-09 PROCEDURE — 3079F PR MOST RECENT DIASTOLIC BLOOD PRESSURE 80-89 MM HG: ICD-10-PCS | Mod: CPTII,S$GLB,, | Performed by: NURSE PRACTITIONER

## 2022-11-09 PROCEDURE — 3079F DIAST BP 80-89 MM HG: CPT | Mod: CPTII,S$GLB,, | Performed by: NURSE PRACTITIONER

## 2022-11-09 PROCEDURE — 1160F PR REVIEW ALL MEDS BY PRESCRIBER/CLIN PHARMACIST DOCUMENTED: ICD-10-PCS | Mod: CPTII,S$GLB,, | Performed by: NURSE PRACTITIONER

## 2022-11-09 PROCEDURE — 3008F PR BODY MASS INDEX (BMI) DOCUMENTED: ICD-10-PCS | Mod: CPTII,S$GLB,, | Performed by: NURSE PRACTITIONER

## 2022-11-09 PROCEDURE — 1159F MED LIST DOCD IN RCRD: CPT | Mod: CPTII,S$GLB,, | Performed by: NURSE PRACTITIONER

## 2022-11-09 PROCEDURE — 3008F BODY MASS INDEX DOCD: CPT | Mod: CPTII,S$GLB,, | Performed by: NURSE PRACTITIONER

## 2022-11-09 PROCEDURE — 3077F PR MOST RECENT SYSTOLIC BLOOD PRESSURE >= 140 MM HG: ICD-10-PCS | Mod: CPTII,S$GLB,, | Performed by: NURSE PRACTITIONER

## 2022-11-09 PROCEDURE — 99203 PR OFFICE/OUTPT VISIT, NEW, LEVL III, 30-44 MIN: ICD-10-PCS | Mod: S$GLB,,, | Performed by: NURSE PRACTITIONER

## 2022-11-09 PROCEDURE — 3077F SYST BP >= 140 MM HG: CPT | Mod: CPTII,S$GLB,, | Performed by: NURSE PRACTITIONER

## 2022-11-09 PROCEDURE — 1159F PR MEDICATION LIST DOCUMENTED IN MEDICAL RECORD: ICD-10-PCS | Mod: CPTII,S$GLB,, | Performed by: NURSE PRACTITIONER

## 2022-11-09 PROCEDURE — 1160F RVW MEDS BY RX/DR IN RCRD: CPT | Mod: CPTII,S$GLB,, | Performed by: NURSE PRACTITIONER

## 2022-11-09 PROCEDURE — 99203 OFFICE O/P NEW LOW 30 MIN: CPT | Mod: S$GLB,,, | Performed by: NURSE PRACTITIONER

## 2022-11-09 NOTE — PROGRESS NOTES
"Subjective:      Gonsalo Mei III is a 27 y.o. male who was referred by Erlinda CONNORS for evaluation of his ureteral stone.  He presents today with his mother who helps to provide medical history.     The patient presented to the ED on 8/18/22 with abdominal/flank pain and vomiting.     CT abdomen/pelvis with contrast demonstrated "There is a 4 mm calculus seen within the midpole of the right ureter with mild right hydroureteronephrosis and right perinephric stranding.  There is no evidence left renal calculus." UA was negative for infection. Discharged with zofran and norco.    Doing well. The patient did not strain his urine after the ER visit. He has not complained of flank pain or discomfort. Denies bothersome urinary symptoms. Denies seeing blood in the urine. Drinks minimal fluids during the day.     The following portions of the patient's history were reviewed and updated as appropriate: allergies, current medications, past family history, past medical history, past social history, past surgical history and problem list.    Review of Systems  Constitutional: no fever or chills  ENT: no nasal congestion or sore throat  Respiratory: no cough or shortness of breath  Cardiovascular: no chest pain or palpitations  Gastrointestinal: no nausea or vomiting, tolerating diet  Genitourinary: as per HPI  Hematologic/Lymphatic: no easy bruising or lymphadenopathy  Musculoskeletal: no arthralgias or myalgias  Neurological: no seizures or tremors  Behavioral/Psych: no auditory or visual hallucinations     Objective:   Vitals:  Vitals:    11/09/22 1302   BP: (!) 148/86   Pulse: 69         Physical Exam   General: alert, no acute distress  Head: normocephalic, atraumatic  Neck: supple, normal ROM  Respiratory: Symmetric expansion, non-labored breathing  Cardiovascular: regular rate and rhythm  Abdomen: soft, non tender, non distended  Genitourinary: deferred  Skin: normal coloration and turgor, no rashes, no " "suspicious skin lesions noted  Neuro: alert and oriented x3, no gross deficits  Psych: normal judgment and insight, normal mood/affect, and non-anxious    Lab Review   Urinalysis demonstrates negative for all components  Lab Results   Component Value Date    WBC 15.76 (H) 08/18/2022    HGB 16.2 08/18/2022    HCT 48.1 08/18/2022    HCT 41 12/24/2019    MCV 95 08/18/2022     08/18/2022     Lab Results   Component Value Date    CREATININE 1.4 08/18/2022    BUN 14 08/18/2022     No results found for: PSA  Imaging   (all images personally reviewed; agree with report below)  CT abdomen/pelvis with contrast (8/22)- "There is a 4 mm calculus seen within the midpole of the right ureter with mild right hydroureteronephrosis and right perinephric stranding.  There is no evidence left renal calculus."    Assessment:     1. Ureteral stone with hydronephrosis      Plan:   Gonsalo was seen today for new patient.    Diagnoses and all orders for this visit:    Ureteral stone with hydronephrosis  -     US Retroperitoneal Complete (Kidney and; Future    Plan:  --Discussed CT findings   --DENNIS to ensure the resolution of hydro, as the stone was not collected  --Recommend general stone preventive measures, to include increased hydration, low Na diet, and increased citrus intake  --Follow up will be determined based on DENNIS results    "

## 2022-11-09 NOTE — Clinical Note
DENNIS on the [facility] please - contact his mother for scheduling. Thanks

## 2022-11-30 ENCOUNTER — HOSPITAL ENCOUNTER (EMERGENCY)
Facility: HOSPITAL | Age: 27
Discharge: HOME | End: 2022-11-30
Attending: EMERGENCY MEDICINE
Payer: MEDICAID

## 2022-11-30 VITALS
BODY MASS INDEX: 20.49 KG/M2 | HEART RATE: 91 BPM | TEMPERATURE: 98 F | WEIGHT: 120 LBS | RESPIRATION RATE: 18 BRPM | OXYGEN SATURATION: 97 % | SYSTOLIC BLOOD PRESSURE: 141 MMHG | DIASTOLIC BLOOD PRESSURE: 78 MMHG | HEIGHT: 64 IN

## 2022-11-30 DIAGNOSIS — R56.9 SEIZURE: Primary | ICD-10-CM

## 2022-11-30 LAB
ANION GAP SERPL CALC-SCNC: 12 MMOL/L (ref 8–16)
BUN SERPL-MCNC: 12 MG/DL (ref 6–20)
CALCIUM SERPL-MCNC: 9.4 MG/DL (ref 8.7–10.5)
CHLORIDE SERPL-SCNC: 107 MMOL/L (ref 95–110)
CO2 SERPL-SCNC: 22 MMOL/L (ref 23–29)
CREAT SERPL-MCNC: 1.3 MG/DL (ref 0.5–1.4)
EST. GFR  (NO RACE VARIABLE): >60 ML/MIN/1.73 M^2
GLUCOSE SERPL-MCNC: 137 MG/DL (ref 70–110)
POTASSIUM SERPL-SCNC: 3.6 MMOL/L (ref 3.5–5.1)
SODIUM SERPL-SCNC: 141 MMOL/L (ref 136–145)

## 2022-11-30 PROCEDURE — 99283 EMERGENCY DEPT VISIT LOW MDM: CPT | Mod: 25

## 2022-11-30 PROCEDURE — 80048 BASIC METABOLIC PNL TOTAL CA: CPT | Performed by: EMERGENCY MEDICINE

## 2022-11-30 PROCEDURE — 25000003 PHARM REV CODE 250: Performed by: EMERGENCY MEDICINE

## 2022-11-30 PROCEDURE — 36415 COLL VENOUS BLD VENIPUNCTURE: CPT | Performed by: EMERGENCY MEDICINE

## 2022-11-30 RX ORDER — RISPERIDONE 1 MG/1
1 TABLET ORAL
Status: DISCONTINUED | OUTPATIENT
Start: 2022-11-30 | End: 2022-11-30

## 2022-11-30 RX ORDER — CLORAZEPATE DIPOTASSIUM 3.75 MG/1
3.75 TABLET ORAL
Status: COMPLETED | OUTPATIENT
Start: 2022-11-30 | End: 2022-11-30

## 2022-11-30 RX ORDER — ONDANSETRON 4 MG/1
4 TABLET, ORALLY DISINTEGRATING ORAL
Status: COMPLETED | OUTPATIENT
Start: 2022-11-30 | End: 2022-11-30

## 2022-11-30 RX ADMIN — ONDANSETRON 4 MG: 4 TABLET, ORALLY DISINTEGRATING ORAL at 08:11

## 2022-11-30 RX ADMIN — CLORAZEPATE DIPOTASSIUM 3.75 MG: 3.75 TABLET ORAL at 08:11

## 2022-12-01 ENCOUNTER — HOSPITAL ENCOUNTER (OUTPATIENT)
Dept: RADIOLOGY | Facility: HOSPITAL | Age: 27
Discharge: HOME | End: 2022-12-01
Attending: NURSE PRACTITIONER
Payer: MEDICAID

## 2022-12-01 DIAGNOSIS — N13.2 URETERAL STONE WITH HYDRONEPHROSIS: ICD-10-CM

## 2022-12-01 PROCEDURE — 76770 US EXAM ABDO BACK WALL COMP: CPT | Mod: TC,PO

## 2022-12-01 NOTE — ED NOTES
Discharge instructions reviewed with pt's mother. Pt awake, alert and verbal on discharge. No distress noted.

## 2022-12-01 NOTE — ED PROVIDER NOTES
Chief complaint:  Seizures (Pt comes in via ems with c/o a seizure. Pt has hx of seizures pt out of seizure medication. Pts mother gave 10mg of midazolam IN when seizure started. Pt arrived in er with a gcs of 10 (3-2-5). Per ems pts oxygen was at 85% on room air pt placed on 4l. Pt now 98% on room air )      HPI:  Gonsalo Mei III is a 27 y.o. male with hx epilepsy, cerebral atropy with cognitive disability presenting with seizure.  Patient on multiple AEDs; ran out of aptiom x 3 days.  Seizure terminated at home after 10 mg IN midazolam by mother.  O2 NC applied by EMS for RA sat 85%.  Mother confirms typical generalized tonic-clonic seizure without fall or injury.  It lasted approximately 20 minutes, terminating shortly after EMS arrival.  Midazolam was given in the 1st several minutes.  Patient has been out of clorazepate (tranxene) pending insurance issue.  He remains on eslicarbazepine (aptiom).  No new symptoms or change prior to seizure x 1 tonight.      ROS: As per HPI and below:  No headache, chest pain, difficulty breathing, cough, congestion, fever, abdominal pain, vomiting, diarrhea, rashes, hematuria.      Review of patient's allergies indicates:   Allergen Reactions    Tamiflu [oseltamivir] Other (See Comments)     seizures       Discharge Medication List as of 11/30/2022  9:11 PM        CONTINUE these medications which have NOT CHANGED    Details   clorazepate (TRANXENE) 3.75 MG Tab Take 7.5 mg by mouth 2 (two) times daily. , Historical Med      diazepam (DIASTAT) 2.5 mg Kit Place 20 mg rectally. , Until Discontinued, Historical Med      !! eslicarbazepine (APTIOM) 200 mg Tab Take 2 tablets (400 mg total) by mouth once daily., Starting Fri 12/27/2019, Normal      !! eslicarbazepine (APTIOM) 800 mg Tab Take 800 mg by mouth every evening. , Historical Med      ibuprofen (ADVIL,MOTRIN) 600 MG tablet Take 1 tablet (600 mg total) by mouth every 8 (eight) hours as needed for Pain., Starting Thu  8/18/2022, Print      lacosamide (VIMPAT) 200 mg Tab Take by mouth 2 (two) times daily., Until Discontinued, Historical Med      lamotrigine (LAMICTAL) 100 MG tablet Take 300 mg by mouth once daily. , Until Discontinued, Historical Med      levETIRAcetam (KEPPRA) 250 MG Tab Take 1 tablet (250 mg total) by mouth 2 (two) times daily., Starting Fri 12/27/2019, Until Sat 12/26/2020, Normal      lisdexamfetamine (VYVANSE) 30 MG capsule Take 30 mg by mouth every morning., Until Discontinued, Historical Med      ondansetron (ZOFRAN-ODT) 8 MG TbDL Take 1 tablet (8 mg total) by mouth 3 (three) times daily as needed (nausea)., Starting Thu 8/18/2022, Print      !! phenytoin (DILANTIN) 50 mg chewable tablet Take 50 mg by mouth before breakfast., Until Discontinued, Historical Med      !! phenytoin (DILANTIN) 50 mg chewable tablet Take 75 mg by mouth every evening., Until Discontinued, Historical Med      risperidone (RISPERDAL M-TABS) 1 MG TbDL Take 1 mg by mouth once daily. , Historical Med      !! topiramate (QUDEXY XR) 100 mg CSpX Take 200 mg by mouth once daily. , Historical Med      !! topiramate (QUDEXY XR) 100 mg CSpX Take 300 mg by mouth every evening., Historical Med       !! - Potential duplicate medications found. Please discuss with provider.          PMH:  As per HPI and below:  Past Medical History:   Diagnosis Date    Cerebellar atrophy     by MRI    Epilepsy      Past Surgical History:   Procedure Laterality Date    TONSILLECTOMY      vagal nerve stimulator (2004) Left 2004       Social History     Socioeconomic History    Marital status: Single   Tobacco Use    Smoking status: Never   Substance and Sexual Activity    Alcohol use: Never    Drug use: Never    Sexual activity: Never     Comment: per mom       Family History   Problem Relation Age of Onset    Asthma Mother     Diabetes Paternal Aunt     Heart disease Paternal Aunt     Hypertension Paternal Aunt     Diabetes Maternal Grandmother     Early death  Maternal Grandmother     Heart disease Maternal Grandmother     Hypertension Maternal Grandmother     Stroke Maternal Grandmother     Cancer Maternal Grandmother     Heart disease Maternal Grandfather     Hypertension Maternal Grandfather        Physical Exam:    Vitals:    11/30/22 2132   BP: (!) 141/78   Pulse: 91   Resp: 18   Temp: 98.4 °F (36.9 °C)     GENERAL:  No apparent distress.  Mild somnolence.     HEENT:  Moist mucous membranes.  Normocephalic and atraumatic.    NECK:  No swelling.  Midline trachea.   CARDIOVASCULAR:  Regular rate and rhythm.  2+ radial pulses.  No murmur.  PULMONARY:  Lungs clear to auscultation bilaterally.  No wheezes, rales, or rhonci.  Unlabored respirations.  ABDOMEN:  Non-tender and non-distended.    EXTREMITIES:  Warm and well perfused.  Brisk capillary refill.    NEUROLOGICAL:  Normal mental status.  Appropriate and conversant.  Moving extremities equally.  Equal response to light touch bilaterally.  SKIN:  No rashes or ecchymoses.    BACK:  Atraumatic.  No CVA tenderness to palpation.      Labs Reviewed   BASIC METABOLIC PANEL - Abnormal; Notable for the following components:       Result Value    CO2 22 (*)     Glucose 137 (*)     All other components within normal limits       Discharge Medication List as of 11/30/2022  9:11 PM        CONTINUE these medications which have NOT CHANGED    Details   clorazepate (TRANXENE) 3.75 MG Tab Take 7.5 mg by mouth 2 (two) times daily. , Historical Med      diazepam (DIASTAT) 2.5 mg Kit Place 20 mg rectally. , Until Discontinued, Historical Med      !! eslicarbazepine (APTIOM) 200 mg Tab Take 2 tablets (400 mg total) by mouth once daily., Starting Fri 12/27/2019, Normal      !! eslicarbazepine (APTIOM) 800 mg Tab Take 800 mg by mouth every evening. , Historical Med      ibuprofen (ADVIL,MOTRIN) 600 MG tablet Take 1 tablet (600 mg total) by mouth every 8 (eight) hours as needed for Pain., Starting Thu 8/18/2022, Print      lacosamide  (VIMPAT) 200 mg Tab Take by mouth 2 (two) times daily., Until Discontinued, Historical Med      lamotrigine (LAMICTAL) 100 MG tablet Take 300 mg by mouth once daily. , Until Discontinued, Historical Med      levETIRAcetam (KEPPRA) 250 MG Tab Take 1 tablet (250 mg total) by mouth 2 (two) times daily., Starting Fri 12/27/2019, Until Sat 12/26/2020, Normal      lisdexamfetamine (VYVANSE) 30 MG capsule Take 30 mg by mouth every morning., Until Discontinued, Historical Med      ondansetron (ZOFRAN-ODT) 8 MG TbDL Take 1 tablet (8 mg total) by mouth 3 (three) times daily as needed (nausea)., Starting Thu 8/18/2022, Print      !! phenytoin (DILANTIN) 50 mg chewable tablet Take 50 mg by mouth before breakfast., Until Discontinued, Historical Med      !! phenytoin (DILANTIN) 50 mg chewable tablet Take 75 mg by mouth every evening., Until Discontinued, Historical Med      risperidone (RISPERDAL M-TABS) 1 MG TbDL Take 1 mg by mouth once daily. , Historical Med      !! topiramate (QUDEXY XR) 100 mg CSpX Take 200 mg by mouth once daily. , Historical Med      !! topiramate (QUDEXY XR) 100 mg CSpX Take 300 mg by mouth every evening., Historical Med       !! - Potential duplicate medications found. Please discuss with provider.          Orders Placed This Encounter   Procedures    Basic Metabolic Panel    Cardiac Monitoring - Adult    Vital signs    Pulse Oximetry Continuous    Insert peripheral IV       Imaging Results    None         ED Course as of 11/30/22 2225 Wed Nov 30, 2022 1937 Patient resumes normal mental status.  He is speaking and answering some basic questions per his baseline.  On my examination cranial nerves 3-12 intact with 5/5 strength and equal sensation to light touch bilaterally. [MR]   1941 Clorazepate ordered qHS x 1 with mother to give other evening meds including AEDs due with RN oversight. [MR]   2109 No seizure activity or further emesis on reassessment.  Patient is able to tolerate medication given  here. [MR]      ED Course User Index  [MR] Pierce Haney MD       MDM:    27 y.o. male with recurrent seizure in the setting of epilepsy out of antiepileptic medication for several days.  This has been a trigger for the patient in the past.  Patient has postictal.  Although is returning to baseline.  I will follow-up patient to return to baseline.  Metabolic panel ordered to exclude electrolyte abnormality.  I have very low suspicion for acute intracranial process at this point I do not think brain imaging or lumbar puncture indicated.  There are no other obvious triggers.  There is no sign of ongoing seizure activity.      Patient with initial transient hypoxia on room air secondary to sedation resolving with resumption of normal level of alertness.  He is resumed normal mental status for patient per mother present without further seizure activity for several hours.  Isolated episode of emesis without further and he is tolerating p.o. including medications.  I do not think requires further observation.  Follow-up with neurology and reconcile appropriate medications with pharmacy.  Return precautions reviewed.    Diagnoses:    1. Seizure       Pierce Haney MD  11/30/22 0184

## 2022-12-01 NOTE — ED NOTES
Pt in EMS after mother states pt had seizure at home. Mother states pt is out of his seizure medication, r/t an insurance issue. States that she gave pt an IM injection when she realized pt was seizing. Pt awake and alert at this time. At baseline pt is cognitively impaired. Alert and able to follow commands. Vital signs stable. Pt on room air. No distress noted.

## 2023-05-12 ENCOUNTER — OFFICE VISIT (OUTPATIENT)
Dept: URGENT CARE | Facility: CLINIC | Age: 28
End: 2023-05-12
Payer: MEDICAID

## 2023-05-12 VITALS
TEMPERATURE: 98 F | HEART RATE: 69 BPM | HEIGHT: 63 IN | BODY MASS INDEX: 27.29 KG/M2 | OXYGEN SATURATION: 100 % | SYSTOLIC BLOOD PRESSURE: 128 MMHG | RESPIRATION RATE: 16 BRPM | DIASTOLIC BLOOD PRESSURE: 83 MMHG | WEIGHT: 154 LBS

## 2023-05-12 DIAGNOSIS — M79.642 LEFT HAND PAIN: Primary | ICD-10-CM

## 2023-05-12 PROCEDURE — 73130 XR HAND COMPLETE 3 VIEW LEFT: ICD-10-PCS | Mod: LT,S$GLB,, | Performed by: RADIOLOGY

## 2023-05-12 PROCEDURE — 99213 PR OFFICE/OUTPT VISIT, EST, LEVL III, 20-29 MIN: ICD-10-PCS | Mod: S$GLB,,, | Performed by: NURSE PRACTITIONER

## 2023-05-12 PROCEDURE — 99213 OFFICE O/P EST LOW 20 MIN: CPT | Mod: S$GLB,,, | Performed by: NURSE PRACTITIONER

## 2023-05-12 PROCEDURE — 73130 X-RAY EXAM OF HAND: CPT | Mod: LT,S$GLB,, | Performed by: RADIOLOGY

## 2023-05-12 NOTE — PATIENT INSTRUCTIONS
Increase clear fluid intake  May apply ice to affected area for 15 minutes every 2 hours.  After 48 hours may progress to moist heat or heating pad.  Take care not to fall sleep on heating pad as this may cause severe burns  Wear Ace wrap at all times to provide comfort and stability.  You may remove it for showering and hygiene, then reapply  May take over-the-counter Tylenol or Motrin as needed for pain  Follow-up primary care provider   Return to clinic for new or worse symptoms
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
## 2023-08-03 ENCOUNTER — HOSPITAL ENCOUNTER (OUTPATIENT)
Dept: RADIOLOGY | Facility: CLINIC | Age: 28
Discharge: HOME | End: 2023-08-03
Attending: PODIATRIST
Payer: MEDICAID

## 2023-08-03 DIAGNOSIS — M79.672 PAIN IN LEFT FOOT: ICD-10-CM

## 2023-08-03 DIAGNOSIS — M79.671 PAIN IN RIGHT FOOT: ICD-10-CM

## 2023-08-03 PROCEDURE — 73630 XR FOOT COMPLETE 3 VIEW BILATERAL: ICD-10-PCS | Mod: 26,50,S$GLB, | Performed by: RADIOLOGY

## 2023-08-03 PROCEDURE — 73630 X-RAY EXAM OF FOOT: CPT | Mod: TC,50,FY,PO

## 2023-08-03 PROCEDURE — 73630 X-RAY EXAM OF FOOT: CPT | Mod: 26,50,S$GLB, | Performed by: RADIOLOGY

## 2023-11-16 NOTE — PROGRESS NOTES
GASTROENTEROLOGY CLINIC NOTE    Chief Complaint: The primary encounter diagnosis was Diarrhea, unspecified type. A diagnosis of Other hemorrhoids was also pertinent to this visit.  Referring provider/PCP: Alex Charles MD    Gonsalo Mei III is a 28 y.o. male who is a new patient to me with a PMH that's significant for epilepsy, hemorrhoids, and developmental delay and is accompanied by his mother.  Patient history is provided by his mother. He is here today to establish care for diarrhea.    Diarrhea    How Lon-6 months   Normal Frequency of Bowel Movements: Daily or twice a day   Maximum Number of Bowel Movements: 3 to 4 per day mostly in morning  Consistency has become more loose   Asymptomatic Days/Days without Bowel Movements: no   Consistency: Very soft to watery    Mucus/Oily/Fatty/Foul Smelling: No mucus; some foul smelling   Urgency/Post Prandial/Undigesteed Food: urgency   Nausea/Vomiting/Fever/Weight Loss: no   Nocturnal Bowel Movements: Some nocturnal bowel movements   Will wake up between 0300 and 0700   Melena/Hematochezia: no   Abdominal Pain/Cramping no   Daily Fiber Supplement: no   Triggers: unsure     Treatments: none    Artifical Sweeteners:no  NSAIDs: no  ETOH: no  Caffeine: no  New medications: no  Antibiotics:  Travel: no  Cholecystectomy: No    GLP-1s: No  NSAIDs: No  Anticoagulation or Antiplatelet: No    History of H.pylori: no  H.pylori Treatment:  Prior Upper Endoscopy: no  Prior Colonoscopy: no  Family h/o Colon Cancer: maternal grandmother  Family h/o Crohn's Disease or Ulcerative Colitis: Mother with Crohn's   Family h/o Celiac Sprue: No  Abdominal Surgeries: no    Review of Systems   Constitutional:  Negative for weight loss.   HENT:  Negative for sore throat.    Eyes:  Negative for blurred vision.   Respiratory:  Negative for cough.    Cardiovascular:  Negative for chest pain.   Gastrointestinal:  Positive for diarrhea. Negative for abdominal pain, blood in stool,  constipation, heartburn, melena, nausea and vomiting.   Genitourinary:  Negative for dysuria.   Musculoskeletal:  Negative for myalgias.   Skin:  Negative for rash.   Neurological:  Negative for headaches.   Endo/Heme/Allergies:  Negative for environmental allergies.   Psychiatric/Behavioral:  Negative for suicidal ideas. The patient is not nervous/anxious.        Past Medical History: has a past medical history of Cerebellar atrophy and Epilepsy.    Past Surgical History: has a past surgical history that includes Tonsillectomy and vagal nerve stimulator (2004) (Left, 2004).    Family History:family history includes Asthma in his mother; Cancer in his maternal grandmother; Diabetes in his maternal grandmother and paternal aunt; Early death in his maternal grandmother; Heart disease in his maternal grandfather, maternal grandmother, and paternal aunt; Hypertension in his maternal grandfather, maternal grandmother, and paternal aunt; Stroke in his maternal grandmother.    Allergies:   Review of patient's allergies indicates:   Allergen Reactions    Levetiracetam Hallucinations and Other (See Comments)    Tamiflu [oseltamivir] Other (See Comments)     seizures       Social History: reports that he has never smoked. He does not have any smokeless tobacco history on file. He reports that he does not drink alcohol and does not use drugs.    Home medications:   Current Outpatient Medications on File Prior to Visit   Medication Sig Dispense Refill    cetirizine (ZYRTEC) 10 MG tablet Take 1 tablet by mouth once daily.      clorazepate (TRANXENE) 3.75 MG Tab Take 7.5 mg by mouth 2 (two) times daily.       clotrimazole (LOTRIMIN) 1 % cream APPLY TO THE AFFECTED AND SURROUNDING AREAS OF SKIN TOPICALY TWO TIMES PER DAY IN THE MORNING AND EVENING      eslicarbazepine (APTIOM) 200 mg Tab Take 2 tablets (400 mg total) by mouth once daily. 60 tablet 5    eslicarbazepine (APTIOM) 800 mg Tab Take 800 mg by mouth every evening.        "fluticasone propionate (FLONASE) 50 mcg/actuation nasal spray as needed for Rhinitis or Allergies      gentian violet 2 % topical solution Apply 1 application by topical route.      risperidone (RISPERDAL M-TABS) 1 MG TbDL Take 1 mg by mouth once daily.       topiramate (QUDEXY XR) 100 mg CSpX Take 300 mg by mouth every evening.      [DISCONTINUED] hydrocortisone 2.5 % cream Apply topically 2 (two) times daily as needed.      [DISCONTINUED] ibuprofen (ADVIL,MOTRIN) 600 MG tablet Take 1 tablet (600 mg total) by mouth every 8 (eight) hours as needed for Pain. (Patient not taking: Reported on 11/17/2023) 20 tablet 0    [DISCONTINUED] topiramate (QUDEXY XR) 100 mg CSpX Take 200 mg by mouth once daily.        No current facility-administered medications on file prior to visit.       Vital signs:  Ht 5' 3" (1.6 m)   Wt 74 kg (163 lb 4 oz)   BMI 28.92 kg/m²     Physical Exam  Vitals reviewed.   Constitutional:       Appearance: Normal appearance.   HENT:      Head: Normocephalic.   Pulmonary:      Effort: Pulmonary effort is normal. No respiratory distress.   Abdominal:      General: There is no distension.      Palpations: Abdomen is soft.      Tenderness: There is no abdominal tenderness.   Skin:     General: Skin is warm.   Neurological:      Mental Status: He is alert and oriented to person, place, and time.   Psychiatric:         Behavior: Behavior normal.         Thought Content: Thought content normal.         Routine labs:  Lab Results   Component Value Date    WBC 15.76 (H) 08/18/2022    HGB 16.2 08/18/2022    HCT 48.1 08/18/2022    MCV 95 08/18/2022     08/18/2022     No results found for: "INR"  No results found for: "IRON", "FERRITIN", "TIBC", "FESATURATED"  Lab Results   Component Value Date     11/30/2022    K 3.6 11/30/2022     11/30/2022    CO2 22 (L) 11/30/2022    BUN 12 11/30/2022    CREATININE 1.3 11/30/2022     Lab Results   Component Value Date    ALBUMIN 4.5 08/18/2022    ALT 23 " 08/18/2022    AST 23 08/18/2022    ALKPHOS 118 08/18/2022    BILITOT 0.7 08/18/2022     Lab Results   Component Value Date    GLUCOSE 85 06/24/2021     Lab Results   Component Value Date    TSH 0.550 12/23/2019     Lab Results   Component Value Date    CALCIUM 9.4 11/30/2022    PHOS 3.6 12/27/2019       Imaging:      I have reviewed prior labs, imaging, and notes.      Assessment:  1. Diarrhea, unspecified type    2. Other hemorrhoids      Diarrhea ongoing for several months. Having 3-4 loose/watery bowel movements per day with urgency. Accompanied by nocturnal bowel movements as well. Bowel movements primarily occurring in morning. Denies abdominal pain, hematochezia, nausea, vomiting, or unexplained weight loss. PCP checked stool for ova/parasites which was negative. Patient's mother with Crohn's Disease.     Plan:  Orders Placed This Encounter    Clostridium difficile EIA    X-Ray Abdomen AP 1 View    Calprotectin, Stool    Fecal fat, qualitative    H. pylori antigen, stool    Giardia / Cryptosporidum, EIA    IgA    Tissue Transglutaminase, IgA    Rotavirus antigen, stool    Pancreatic elastase, fecal    WBC, Stool    hydrocortisone 2.5 % cream     Abdominal xray to evaluate stool burden and overflow diarrhea  Celiac Labs  Stool Studies  Start daily fiber supplement.     Consider EGD/Colonoscopy pending workup and symptoms.     Plan of care discussed with patient who is in agreement and verbalized understanding.     I have explained the planned procedures to the patient.The risks, benefits and alternatives of the procedure were also explained in detail. Patient verbalized understanding, all questions were answered. The patient agrees to proceed as planned    Follow Up: Pending Workup          Katerine Heredia, APRN,FNP-BC  [facility]    (Portions of this note were dictated using voice recognition software and may contain dictation related errors in spelling/grammar/syntax not  found on text review)

## 2023-11-17 ENCOUNTER — TELEPHONE (OUTPATIENT)
Dept: GASTROENTEROLOGY | Facility: CLINIC | Age: 28
End: 2023-11-17

## 2023-11-17 ENCOUNTER — OFFICE VISIT (OUTPATIENT)
Dept: GASTROENTEROLOGY | Facility: CLINIC | Age: 28
End: 2023-11-17
Payer: MEDICAID

## 2023-11-17 ENCOUNTER — TELEPHONE (OUTPATIENT)
Dept: GASTROENTEROLOGY | Facility: CLINIC | Age: 28
End: 2023-11-17
Payer: MEDICAID

## 2023-11-17 VITALS — HEIGHT: 63 IN | WEIGHT: 163.25 LBS | BODY MASS INDEX: 28.93 KG/M2

## 2023-11-17 DIAGNOSIS — R19.7 DIARRHEA, UNSPECIFIED TYPE: Primary | ICD-10-CM

## 2023-11-17 DIAGNOSIS — K64.8 OTHER HEMORRHOIDS: ICD-10-CM

## 2023-11-17 PROCEDURE — 1159F MED LIST DOCD IN RCRD: CPT | Mod: CPTII,,, | Performed by: NURSE PRACTITIONER

## 2023-11-17 PROCEDURE — 99999 PR PBB SHADOW E&M-EST. PATIENT-LVL III: CPT | Mod: PBBFAC,,, | Performed by: NURSE PRACTITIONER

## 2023-11-17 PROCEDURE — 1159F PR MEDICATION LIST DOCUMENTED IN MEDICAL RECORD: ICD-10-PCS | Mod: CPTII,,, | Performed by: NURSE PRACTITIONER

## 2023-11-17 PROCEDURE — 3008F PR BODY MASS INDEX (BMI) DOCUMENTED: ICD-10-PCS | Mod: CPTII,,, | Performed by: NURSE PRACTITIONER

## 2023-11-17 PROCEDURE — 3008F BODY MASS INDEX DOCD: CPT | Mod: CPTII,,, | Performed by: NURSE PRACTITIONER

## 2023-11-17 PROCEDURE — 99214 OFFICE O/P EST MOD 30 MIN: CPT | Mod: S$PBB,,, | Performed by: NURSE PRACTITIONER

## 2023-11-17 PROCEDURE — 99213 OFFICE O/P EST LOW 20 MIN: CPT | Mod: PBBFAC,PN | Performed by: NURSE PRACTITIONER

## 2023-11-17 PROCEDURE — 99214 PR OFFICE/OUTPT VISIT, EST, LEVL IV, 30-39 MIN: ICD-10-PCS | Mod: S$PBB,,, | Performed by: NURSE PRACTITIONER

## 2023-11-17 PROCEDURE — 99999 PR PBB SHADOW E&M-EST. PATIENT-LVL III: ICD-10-PCS | Mod: PBBFAC,,, | Performed by: NURSE PRACTITIONER

## 2023-11-17 RX ORDER — CETIRIZINE HYDROCHLORIDE 10 MG/1
1 TABLET ORAL DAILY
COMMUNITY

## 2023-11-17 RX ORDER — HYDROCORTISONE 25 MG/G
CREAM TOPICAL 2 TIMES DAILY PRN
Qty: 28 G | Refills: 1 | Status: SHIPPED | OUTPATIENT
Start: 2023-11-17 | End: 2024-11-16

## 2023-11-17 RX ORDER — FLUTICASONE PROPIONATE 50 MCG
SPRAY, SUSPENSION (ML) NASAL
COMMUNITY

## 2023-11-17 RX ORDER — CLOTRIMAZOLE 1 %
CREAM (GRAM) TOPICAL
COMMUNITY

## 2023-11-17 RX ORDER — HYDROCORTISONE 25 MG/G
CREAM TOPICAL 2 TIMES DAILY PRN
COMMUNITY
Start: 2023-08-07 | End: 2023-11-17 | Stop reason: SDUPTHER

## 2023-11-17 RX ORDER — GENTIAN VIOLET 2% 2 G/100ML
SOLUTION TOPICAL
COMMUNITY
Start: 2023-08-02

## 2023-11-17 RX ORDER — HYDROCORTISONE 25 MG/G
CREAM TOPICAL 2 TIMES DAILY PRN
Qty: 3.5 G | Refills: 1 | Status: SHIPPED | OUTPATIENT
Start: 2023-11-17 | End: 2023-11-17 | Stop reason: SDUPTHER

## 2023-11-17 NOTE — TELEPHONE ENCOUNTER
Resent to pharmacy 28g ordered    ----- Message from Matt Puente MA sent at 11/17/2023  4:27 PM CST -----  Contact: pt @ [phone]    ----- Message -----  From: Meli Blandon MA  Sent: 11/17/2023   4:01 PM CST  To: Matt Puente MA      ----- Message -----  From: Isabel Trujillo  Sent: 11/17/2023   3:40 PM CST  To: [facility] Staff    Zac calling regarding Patient Advice (message) for #calling to let office know that the script was sent over for pt only comes in 28grams, asking for call back

## 2023-11-17 NOTE — PATIENT INSTRUCTIONS
Stool Studies, labs, and xray  Start Metamucil (psyllium husk) once a day.   Mix 1 tablespoon in 7 ounces of water OR take 3 capsules once a day.     Stool Collection Kit Instructions    Place stool Collection Hat under your toilet seat   With and disposable spoon scoop and fill the provided cup with stool to the half way herlinda on the stool cup (if your stool is runny or liquid that is fine)  Place the top on the stool cup  Place the stool cup in the biohazard bag and zip the bag closed   Place the biohazard bag in the brown paper bag that is provided   Bring the stool sample to any [facility] lab (Any location where you can get blood work done)  Discard any extra equipment (throw away the stool hat, spoon WE DO NOT NEED THAT BACK)      If the sample is collected after clinic hours, please place in refrigerator or cooler until the next morning.  Please make sure that there is no Tissue, wipes, or other paper attached to the stool cup (the lab will have you repeat if something is attached to the cup).

## 2023-11-17 NOTE — TELEPHONE ENCOUNTER
Patient's mother notified.    ----- Message from Katerine Gonzalez NP sent at 11/17/2023  3:32 PM CST -----  Please let patient's mother know there is stool build up in the right side of large intestine. Diarrhea may be due to overflow as we discussed. Start daily fiber, try to get Gonsalo to drink as much water as possible, and turn in stool studies.

## 2023-11-28 ENCOUNTER — LAB VISIT (OUTPATIENT)
Dept: LAB | Facility: HOSPITAL | Age: 28
End: 2023-11-28
Attending: NURSE PRACTITIONER
Payer: MEDICAID

## 2023-11-28 DIAGNOSIS — R19.7 DIARRHEA, UNSPECIFIED TYPE: ICD-10-CM

## 2023-11-28 LAB
C DIFF GDH STL QL: NEGATIVE
C DIFF TOX A+B STL QL IA: NEGATIVE

## 2023-11-28 PROCEDURE — 87338 HPYLORI STOOL AG IA: CPT | Performed by: NURSE PRACTITIONER

## 2023-11-28 PROCEDURE — 83993 ASSAY FOR CALPROTECTIN FECAL: CPT | Performed by: NURSE PRACTITIONER

## 2023-11-28 PROCEDURE — 82705 FATS/LIPIDS FECES QUAL: CPT | Performed by: NURSE PRACTITIONER

## 2023-11-28 PROCEDURE — 87329 GIARDIA AG IA: CPT | Performed by: NURSE PRACTITIONER

## 2023-11-28 PROCEDURE — 89055 LEUKOCYTE ASSESSMENT FECAL: CPT | Performed by: NURSE PRACTITIONER

## 2023-11-28 PROCEDURE — 87449 NOS EACH ORGANISM AG IA: CPT | Performed by: NURSE PRACTITIONER

## 2023-11-28 PROCEDURE — 82653 EL-1 FECAL QUANTITATIVE: CPT | Performed by: NURSE PRACTITIONER

## 2023-11-28 PROCEDURE — 87425 ROTAVIRUS AG IA: CPT | Performed by: NURSE PRACTITIONER

## 2023-11-29 LAB
CRYPTOSP AG STL QL IA: NEGATIVE
G LAMBLIA AG STL QL IA: NEGATIVE
RV AG STL QL IA.RAPID: NEGATIVE
WBC #/AREA STL HPF: ABNORMAL /[HPF]

## 2023-12-01 LAB
ELASTASE 1, FECAL: 265 MCG/G
FAT STL QL: NORMAL
NEUTRAL FAT STL QL: NORMAL

## 2023-12-05 ENCOUNTER — PATIENT MESSAGE (OUTPATIENT)
Dept: GASTROENTEROLOGY | Facility: CLINIC | Age: 28
End: 2023-12-05
Payer: MEDICAID

## 2023-12-05 DIAGNOSIS — B96.81 GASTRITIS, HELICOBACTER PYLORI: Primary | ICD-10-CM

## 2023-12-05 DIAGNOSIS — K29.70 GASTRITIS, HELICOBACTER PYLORI: Primary | ICD-10-CM

## 2023-12-05 LAB
CALPROTECTIN STL-MCNT: 224.9 MCG/G
H PYLORI AG STL QL IA: DETECTED

## 2023-12-05 RX ORDER — PANTOPRAZOLE SODIUM 40 MG/1
40 TABLET, DELAYED RELEASE ORAL 2 TIMES DAILY
Qty: 20 TABLET | Refills: 0 | Status: SHIPPED | OUTPATIENT
Start: 2023-12-05 | End: 2024-01-23

## 2023-12-05 RX ORDER — BISMUTH SUBCITRATE POTASSIUM, METRONIDAZOLE AND TETRACYCLINE HYDROCHLORIDE 140; 125; 125 MG/1; MG/1; MG/1
3 CAPSULE ORAL 4 TIMES DAILY
Qty: 120 CAPSULE | Refills: 0 | Status: SHIPPED | OUTPATIENT
Start: 2023-12-05 | End: 2023-12-15

## 2023-12-11 ENCOUNTER — PATIENT MESSAGE (OUTPATIENT)
Dept: GASTROENTEROLOGY | Facility: CLINIC | Age: 28
End: 2023-12-11
Payer: MEDICAID

## 2023-12-11 NOTE — TELEPHONE ENCOUNTER
Spoke with patients mother and informed her of the stool results. Verbal Understanding. Dilia scheduled on 1/23/24 at 3:40pm.

## 2024-01-23 ENCOUNTER — OFFICE VISIT (OUTPATIENT)
Dept: GASTROENTEROLOGY | Facility: CLINIC | Age: 29
End: 2024-01-23
Payer: MEDICAID

## 2024-01-23 ENCOUNTER — PATIENT MESSAGE (OUTPATIENT)
Dept: GASTROENTEROLOGY | Facility: CLINIC | Age: 29
End: 2024-01-23
Payer: MEDICAID

## 2024-01-23 DIAGNOSIS — B96.81 GASTRITIS, HELICOBACTER PYLORI: Primary | ICD-10-CM

## 2024-01-23 DIAGNOSIS — K29.70 GASTRITIS, HELICOBACTER PYLORI: Primary | ICD-10-CM

## 2024-01-23 PROCEDURE — 1159F MED LIST DOCD IN RCRD: CPT | Mod: CPTII,95,, | Performed by: NURSE PRACTITIONER

## 2024-01-23 PROCEDURE — 1160F RVW MEDS BY RX/DR IN RCRD: CPT | Mod: CPTII,95,, | Performed by: NURSE PRACTITIONER

## 2024-01-23 PROCEDURE — 99214 OFFICE O/P EST MOD 30 MIN: CPT | Mod: 95,,, | Performed by: NURSE PRACTITIONER

## 2024-01-23 RX ORDER — PANTOPRAZOLE SODIUM 40 MG/1
40 TABLET, DELAYED RELEASE ORAL 2 TIMES DAILY
Qty: 28 TABLET | Refills: 0 | Status: SHIPPED | OUTPATIENT
Start: 2024-01-23 | End: 2024-03-12

## 2024-01-23 RX ORDER — TETRACYCLINE HYDROCHLORIDE 500 MG/1
500 CAPSULE ORAL EVERY 6 HOURS
Qty: 56 CAPSULE | Refills: 0 | Status: SHIPPED | OUTPATIENT
Start: 2024-01-23 | End: 2024-02-06

## 2024-01-23 RX ORDER — METRONIDAZOLE 250 MG/1
250 TABLET ORAL EVERY 6 HOURS
Qty: 56 TABLET | Refills: 0 | Status: SHIPPED | OUTPATIENT
Start: 2024-01-23 | End: 2024-02-06

## 2024-01-23 NOTE — PROGRESS NOTES
GASTROENTEROLOGY CLINIC NOTE    Chief Complaint: The encounter diagnosis was Gastritis, Helicobacter pylori.  Referring provider/PCP: Alex Charles MD    Gonsalo Mei III is a 28 y.o. male who is a new patient to me with a PMH that's significant for epilepsy, hemorrhoids, and developmental delay and is accompanied by his mother.  Patient history is provided by his mother. He is here today to establish care for diarrhea.    Diarrhea    How Lon-6 months   Normal Frequency of Bowel Movements: Daily or twice a day   Maximum Number of Bowel Movements: 3 to 4 per day mostly in morning  Consistency has become more loose   Asymptomatic Days/Days without Bowel Movements: no   Consistency: Very soft to watery    Mucus/Oily/Fatty/Foul Smelling: No mucus; some foul smelling   Urgency/Post Prandial/Undigesteed Food: urgency   Nausea/Vomiting/Fever/Weight Loss: no   Nocturnal Bowel Movements: Some nocturnal bowel movements   Will wake up between 0300 and 0700   Melena/Hematochezia: no   Abdominal Pain/Cramping no   Daily Fiber Supplement: no   Triggers: unsure     Treatments: none    Artifical Sweeteners:no  NSAIDs: no  ETOH: no  Caffeine: no  New medications: no  Antibiotics:  Travel: no  Cholecystectomy: No    Interval Note 2024  The patient location is: [place]  The chief complaint leading to consultation is: H.pylori Follow Up    Visit type: audiovisual    Face to Face time with patient: 9:16  30 minutes of total time spent on the encounter, which includes face to face time and non-face to face time preparing to see the patient (eg, review of tests), Obtaining and/or reviewing separately obtained history, Documenting clinical information in the electronic or other health record, Independently interpreting results (not separately reported) and communicating results to the patient/family/caregiver, or Care coordination (not separately reported).     Each patient to whom he or she provides medical  services by telemedicine is:  (1) informed of the relationship between the physician and patient and the respective role of any other health care provider with respect to management of the patient; and (2) notified that he or she may decline to receive medical services by telemedicine and may withdraw from such care at any time.    Notes:    Gonsalo Mei who is known to me presents via telemedicine encounter for follow-up regarding H pylori and diarrhea.  He is accompanied by his mother who helps to provide patient history.  Following last office visit abdominal x-ray obtained which revealed mild stool burden on bright side of large intestine.  It was recommended he start daily fiber supplement.  Stool studies were also obtained.  Stool noted to be positive for H pylori and calprotectin elevated to 224.9.  Pylera and Protonix were sent to pharmacy.  Patient's mother states he was not able to complete Pylera as the pharmacy has not been able to get the medication.  She has started the fiber supplement in the evening and has noted some improvement in consistency of the stool.  He is still having some nocturnal bowel movements.    GLP-1s: No  NSAIDs: No  Anticoagulation or Antiplatelet: No    History of H.pylori: no  H.pylori Treatment:  Prior Upper Endoscopy: no  Prior Colonoscopy: no  Family h/o Colon Cancer: maternal grandmother  Family h/o Crohn's Disease or Ulcerative Colitis: Mother with Crohn's   Family h/o Celiac Sprue: No  Abdominal Surgeries: no    Review of Systems   Constitutional:  Negative for weight loss.   HENT:  Negative for sore throat.    Eyes:  Negative for blurred vision.   Respiratory:  Negative for cough.    Cardiovascular:  Negative for chest pain.   Gastrointestinal:  Positive for diarrhea. Negative for abdominal pain, blood in stool, constipation, heartburn, melena, nausea and vomiting.   Genitourinary:  Negative for dysuria.   Musculoskeletal:  Negative for myalgias.   Skin:  Negative for  rash.   Neurological:  Negative for headaches.   Endo/Heme/Allergies:  Negative for environmental allergies.   Psychiatric/Behavioral:  Negative for suicidal ideas. The patient is not nervous/anxious.        Past Medical History: has a past medical history of Cerebellar atrophy and Epilepsy.    Past Surgical History: has a past surgical history that includes Tonsillectomy and vagal nerve stimulator (2004) (Left, 2004).    Family History:family history includes Asthma in his mother; Cancer in his maternal grandmother; Diabetes in his maternal grandmother and paternal aunt; Early death in his maternal grandmother; Heart disease in his maternal grandfather, maternal grandmother, and paternal aunt; Hypertension in his maternal grandfather, maternal grandmother, and paternal aunt; Stroke in his maternal grandmother.    Allergies:   Review of patient's allergies indicates:   Allergen Reactions    Levetiracetam Hallucinations and Other (See Comments)    Tamiflu [oseltamivir] Other (See Comments)     seizures       Social History: reports that he has never smoked. He does not have any smokeless tobacco history on file. He reports that he does not drink alcohol and does not use drugs.    Home medications:   Current Outpatient Medications on File Prior to Visit   Medication Sig Dispense Refill    cetirizine (ZYRTEC) 10 MG tablet Take 1 tablet by mouth once daily.      clorazepate (TRANXENE) 3.75 MG Tab Take 7.5 mg by mouth 2 (two) times daily.       clotrimazole (LOTRIMIN) 1 % cream APPLY TO THE AFFECTED AND SURROUNDING AREAS OF SKIN TOPICALY TWO TIMES PER DAY IN THE MORNING AND EVENING      eslicarbazepine (APTIOM) 200 mg Tab Take 2 tablets (400 mg total) by mouth once daily. 60 tablet 5    eslicarbazepine (APTIOM) 800 mg Tab Take 800 mg by mouth every evening.       fluticasone propionate (FLONASE) 50 mcg/actuation nasal spray as needed for Rhinitis or Allergies      gentian violet 2 % topical solution Apply 1 application  "by topical route.      hydrocortisone 2.5 % cream Apply topically 2 (two) times daily as needed (hemorrhoids). 28 g 1    risperidone (RISPERDAL M-TABS) 1 MG TbDL Take 1 mg by mouth once daily.       topiramate (QUDEXY XR) 100 mg CSpX Take 300 mg by mouth every evening.      [DISCONTINUED] pantoprazole (PROTONIX) 40 MG tablet Take 1 tablet (40 mg total) by mouth 2 (two) times daily. for 10 days 20 tablet 0     No current facility-administered medications on file prior to visit.       Vital signs:  There were no vitals taken for this visit.    Physical Exam  Constitutional:       Appearance: Normal appearance. He is not ill-appearing.      Comments: Limited Physical Exam d/t Telemedicine Encounter   HENT:      Head: Normocephalic.   Pulmonary:      Effort: Pulmonary effort is normal. No respiratory distress.   Neurological:      Mental Status: He is alert and oriented to person, place, and time.   Psychiatric:         Mood and Affect: Mood normal.         Behavior: Behavior normal.         Thought Content: Thought content normal.         Judgment: Judgment normal.         Routine labs:  Lab Results   Component Value Date    WBC 15.76 (H) 08/18/2022    HGB 16.2 08/18/2022    HCT 48.1 08/18/2022    MCV 95 08/18/2022     08/18/2022     No results found for: "INR"  No results found for: "IRON", "FERRITIN", "TIBC", "FESATURATED"  Lab Results   Component Value Date     11/30/2022    K 3.6 11/30/2022     11/30/2022    CO2 22 (L) 11/30/2022    BUN 12 11/30/2022    CREATININE 1.3 11/30/2022     Lab Results   Component Value Date    ALBUMIN 4.5 08/18/2022    ALT 23 08/18/2022    AST 23 08/18/2022    ALKPHOS 118 08/18/2022    BILITOT 0.7 08/18/2022     Lab Results   Component Value Date    GLUCOSE 85 06/24/2021     Lab Results   Component Value Date    TSH 0.550 12/23/2019     Lab Results   Component Value Date    CALCIUM 9.4 11/30/2022    PHOS 3.6 12/27/2019       Imaging:      I have reviewed prior labs, " imaging, and notes.      Assessment:  1. Gastritis, Helicobacter pylori      Stool positive for H pylori.  Unable to complete Pylera as pharmacy has not been able to get medication.  Has started daily fiber supplement which is helping improve consistency of bowel movements.  Will check with pharmacy to see if Pylera is available.  If not will send in medications separately.    Plan:  Orders Placed This Encounter    bismuth subsalicylate 262 mg Tab    metroNIDAZOLE (FLAGYL) 250 MG tablet    tetracycline (ACHROMYCIN,SUMYCIN) 500 MG capsule    pantoprazole (PROTONIX) 40 MG tablet     Bismuth  Flagyl  Tetracycline  Protonix    Patient's mother instructed to have patient complete full 14 days of medication.  Continue daily fiber supplement    Will follow-up in 6 weeks to check stool for eradication.  If stool is negative and diarrhea is continuing, consider repeating calprotectin or  Consider EGD/Colonoscopy.     Plan of care discussed with patient who is in agreement and verbalized understanding.     I have explained the planned procedures to the patient.The risks, benefits and alternatives of the procedure were also explained in detail. Patient verbalized understanding, all questions were answered. The patient agrees to proceed as planned    Follow Up:  6 weeks          Katerine Heredia, FRANK,FNP-BC  [facility]    (Portions of this note were dictated using voice recognition software and may contain dictation related errors in spelling/grammar/syntax not found on text review)

## 2024-01-24 ENCOUNTER — TELEPHONE (OUTPATIENT)
Dept: GASTROENTEROLOGY | Facility: CLINIC | Age: 29
End: 2024-01-24
Payer: MEDICAID

## 2024-03-12 ENCOUNTER — OFFICE VISIT (OUTPATIENT)
Dept: GASTROENTEROLOGY | Facility: CLINIC | Age: 29
End: 2024-03-12
Payer: MEDICAID

## 2024-03-12 DIAGNOSIS — K29.70 GASTRITIS, HELICOBACTER PYLORI: Primary | ICD-10-CM

## 2024-03-12 DIAGNOSIS — B96.81 GASTRITIS, HELICOBACTER PYLORI: Primary | ICD-10-CM

## 2024-03-12 PROCEDURE — 99214 OFFICE O/P EST MOD 30 MIN: CPT | Mod: 95,,, | Performed by: NURSE PRACTITIONER

## 2024-03-12 PROCEDURE — 1160F RVW MEDS BY RX/DR IN RCRD: CPT | Mod: CPTII,95,, | Performed by: NURSE PRACTITIONER

## 2024-03-12 PROCEDURE — 1159F MED LIST DOCD IN RCRD: CPT | Mod: CPTII,95,, | Performed by: NURSE PRACTITIONER

## 2024-03-12 NOTE — PROGRESS NOTES
GASTROENTEROLOGY CLINIC NOTE    Chief Complaint: The encounter diagnosis was Gastritis, Helicobacter pylori.  Referring provider/PCP: Alex Charles MD    Gonsalo Mei III is a 28 y.o. male who is a new patient to me with a PMH that's significant for epilepsy, hemorrhoids, and developmental delay and is accompanied by his mother.  Patient history is provided by his mother. He is here today to establish care for diarrhea.    Diarrhea    How Lon-6 months   Normal Frequency of Bowel Movements: Daily or twice a day   Maximum Number of Bowel Movements: 3 to 4 per day mostly in morning  Consistency has become more loose   Asymptomatic Days/Days without Bowel Movements: no   Consistency: Very soft to watery    Mucus/Oily/Fatty/Foul Smelling: No mucus; some foul smelling   Urgency/Post Prandial/Undigesteed Food: urgency   Nausea/Vomiting/Fever/Weight Loss: no   Nocturnal Bowel Movements: Some nocturnal bowel movements   Will wake up between 0300 and 0700   Melena/Hematochezia: no   Abdominal Pain/Cramping no   Daily Fiber Supplement: no   Triggers: unsure     Treatments: none    Artifical Sweeteners:no  NSAIDs: no  ETOH: no  Caffeine: no  New medications: no  Antibiotics:  Travel: no  Cholecystectomy: No  _____________________________________________________________________________________    Interval Note 2024  Gonsalo Mei who is known to me presents via telemedicine encounter for follow-up regarding H pylori and diarrhea.  He is accompanied by his mother who helps to provide patient history.  Following last office visit abdominal x-ray obtained which revealed mild stool burden on bright side of large intestine.  It was recommended he start daily fiber supplement.  Stool studies were also obtained.  Stool noted to be positive for H pylori and calprotectin elevated to 224.9.  Pylera and Protonix were sent to pharmacy.  Patient's mother states he was not able to complete Pylera as the pharmacy has  not been able to get the medication.  She has started the fiber supplement in the evening and has noted some improvement in consistency of the stool.  He is still having some nocturnal bowel movements.  _______________________________________________________________________________________    Interval Note 3/12/2024    The patient location is: [place]  The chief complaint leading to consultation is: Follow up H.pylori    Visit type: audiovisual    Face to Face time with patient: 7:51  15 minutes of total time spent on the encounter, which includes face to face time and non-face to face time preparing to see the patient (eg, review of tests), Obtaining and/or reviewing separately obtained history, Documenting clinical information in the electronic or other health record, Independently interpreting results (not separately reported) and communicating results to the patient/family/caregiver, or Care coordination (not separately reported).     Each patient to whom he or she provides medical services by telemedicine is:  (1) informed of the relationship between the physician and patient and the respective role of any other health care provider with respect to management of the patient; and (2) notified that he or she may decline to receive medical services by telemedicine and may withdraw from such care at any time.    Notes:      Albert Mei who is known to me presents via telemedicine encounter for follow up regarding h.pylori. He is accompanied by his mother. Completed H.pylori treatment. Bowel movements have improved.   Having daily soft bowel movements. Continues with Metamucil daily. Has no complaints at this time.    GLP-1s: No  NSAIDs: No  Anticoagulation or Antiplatelet: No    History of H.pylori: yes; stool antigen positive 2023  H.pylori Treatment: Quadruple therapy with Protonix, flagyl, tetracycline, and bismuth  Prior Upper Endoscopy: no  Prior Colonoscopy: no  Family h/o Colon Cancer: maternal  grandmother  Family h/o Crohn's Disease or Ulcerative Colitis: Mother with Crohn's   Family h/o Celiac Sprue: No  Abdominal Surgeries: no    Review of Systems   Constitutional:  Negative for weight loss.   HENT:  Negative for sore throat.    Eyes:  Negative for blurred vision.   Respiratory:  Negative for cough.    Cardiovascular:  Negative for chest pain.   Gastrointestinal:  Negative for abdominal pain, blood in stool, constipation, diarrhea, heartburn, melena, nausea and vomiting.   Genitourinary:  Negative for dysuria.   Musculoskeletal:  Negative for myalgias.   Skin:  Negative for rash.   Neurological:  Negative for headaches.   Endo/Heme/Allergies:  Negative for environmental allergies.   Psychiatric/Behavioral:  Negative for suicidal ideas. The patient is not nervous/anxious.        Past Medical History: has a past medical history of Cerebellar atrophy and Epilepsy.    Past Surgical History: has a past surgical history that includes Tonsillectomy and vagal nerve stimulator (2004) (Left, 2004).    Family History:family history includes Asthma in his mother; Cancer in his maternal grandmother; Diabetes in his maternal grandmother and paternal aunt; Early death in his maternal grandmother; Heart disease in his maternal grandfather, maternal grandmother, and paternal aunt; Hypertension in his maternal grandfather, maternal grandmother, and paternal aunt; Stroke in his maternal grandmother.    Allergies:   Review of patient's allergies indicates:   Allergen Reactions    Levetiracetam Hallucinations and Other (See Comments)    Tamiflu [oseltamivir] Other (See Comments)     seizures       Social History: reports that he has never smoked. He does not have any smokeless tobacco history on file. He reports that he does not drink alcohol and does not use drugs.    Home medications:   Current Outpatient Medications on File Prior to Visit   Medication Sig Dispense Refill    cetirizine (ZYRTEC) 10 MG tablet Take 1 tablet  "by mouth once daily.      clorazepate (TRANXENE) 3.75 MG Tab Take 7.5 mg by mouth 2 (two) times daily.       clotrimazole (LOTRIMIN) 1 % cream APPLY TO THE AFFECTED AND SURROUNDING AREAS OF SKIN TOPICALY TWO TIMES PER DAY IN THE MORNING AND EVENING      eslicarbazepine (APTIOM) 200 mg Tab Take 2 tablets (400 mg total) by mouth once daily. 60 tablet 5    eslicarbazepine (APTIOM) 800 mg Tab Take 800 mg by mouth every evening.       fluticasone propionate (FLONASE) 50 mcg/actuation nasal spray as needed for Rhinitis or Allergies      gentian violet 2 % topical solution Apply 1 application by topical route.      hydrocortisone 2.5 % cream Apply topically 2 (two) times daily as needed (hemorrhoids). 28 g 1    risperidone (RISPERDAL M-TABS) 1 MG TbDL Take 1 mg by mouth once daily.       topiramate (QUDEXY XR) 100 mg CSpX Take 300 mg by mouth every evening.      [DISCONTINUED] pantoprazole (PROTONIX) 40 MG tablet Take 1 tablet (40 mg total) by mouth 2 (two) times daily. for 14 days 28 tablet 0     No current facility-administered medications on file prior to visit.       Vital signs:  There were no vitals taken for this visit.    Physical Exam  Constitutional:       Appearance: Normal appearance. He is not ill-appearing.      Comments: Limited Physical Exam d/t Telemedicine Encounter   HENT:      Head: Normocephalic.   Pulmonary:      Effort: Pulmonary effort is normal. No respiratory distress.   Neurological:      Mental Status: He is alert and oriented to person, place, and time.   Psychiatric:         Mood and Affect: Mood normal.         Behavior: Behavior normal.         Thought Content: Thought content normal.         Judgment: Judgment normal.         Routine labs:  Lab Results   Component Value Date    WBC 15.76 (H) 08/18/2022    HGB 16.2 08/18/2022    HCT 48.1 08/18/2022    MCV 95 08/18/2022     08/18/2022     No results found for: "INR"  No results found for: "IRON", "FERRITIN", "TIBC", "FESATURATED"  Lab " Results   Component Value Date     11/30/2022    K 3.6 11/30/2022     11/30/2022    CO2 22 (L) 11/30/2022    BUN 12 11/30/2022    CREATININE 1.3 11/30/2022     Lab Results   Component Value Date    ALBUMIN 4.5 08/18/2022    ALT 23 08/18/2022    AST 23 08/18/2022    ALKPHOS 118 08/18/2022    BILITOT 0.7 08/18/2022     Lab Results   Component Value Date    GLUCOSE 85 06/24/2021     Lab Results   Component Value Date    TSH 0.550 12/23/2019     Lab Results   Component Value Date    CALCIUM 9.4 11/30/2022    PHOS 3.6 12/27/2019       Imaging:      I have reviewed prior labs, imaging, and notes.      Assessment:  1. Gastritis, Helicobacter pylori      Completed quad therapy for H.pylori. Diarrhea has improved.   Taking Metamucil daily    Plan:  Orders Placed This Encounter    H. pylori antigen, stool    Calprotectin, Stool     Stool H.pylori to confirm eradication  Repeat calprotectin as it was previously elevated and there is a family h/o IBD in his mother  Continue daily fiber supplement      Plan of care discussed with patient who is in agreement and verbalized understanding.     I have explained the planned procedures to the patient.The risks, benefits and alternatives of the procedure were also explained in detail. Patient verbalized understanding, all questions were answered. The patient agrees to proceed as planned    Follow Up:  SONU Heredia, APRN,FNP-BC  [facility] [place]    (Portions of this note were dictated using voice recognition software and may contain dictation related errors in spelling/grammar/syntax not found on text review)

## 2024-03-26 ENCOUNTER — LAB VISIT (OUTPATIENT)
Dept: LAB | Facility: HOSPITAL | Age: 29
End: 2024-03-26
Attending: NURSE PRACTITIONER
Payer: MEDICAID

## 2024-03-26 DIAGNOSIS — B96.81 GASTRITIS, HELICOBACTER PYLORI: ICD-10-CM

## 2024-03-26 DIAGNOSIS — K29.70 GASTRITIS, HELICOBACTER PYLORI: ICD-10-CM

## 2024-03-26 PROCEDURE — 83993 ASSAY FOR CALPROTECTIN FECAL: CPT | Performed by: NURSE PRACTITIONER

## 2024-03-26 PROCEDURE — 87338 HPYLORI STOOL AG IA: CPT | Performed by: NURSE PRACTITIONER

## 2024-04-01 LAB — CALPROTECTIN STL-MCNT: 17.1 MCG/G

## 2024-04-03 LAB — H PYLORI AG STL QL IA: NOT DETECTED

## 2024-05-17 ENCOUNTER — OFFICE VISIT (OUTPATIENT)
Dept: URGENT CARE | Facility: CLINIC | Age: 29
End: 2024-05-17
Payer: MEDICAID

## 2024-05-17 ENCOUNTER — HOSPITAL ENCOUNTER (EMERGENCY)
Facility: HOSPITAL | Age: 29
Discharge: HOME | End: 2024-05-17
Attending: EMERGENCY MEDICINE
Payer: MEDICAID

## 2024-05-17 VITALS
BODY MASS INDEX: 28.88 KG/M2 | OXYGEN SATURATION: 98 % | SYSTOLIC BLOOD PRESSURE: 134 MMHG | TEMPERATURE: 99 F | DIASTOLIC BLOOD PRESSURE: 86 MMHG | HEART RATE: 117 BPM | WEIGHT: 163 LBS | RESPIRATION RATE: 18 BRPM | HEIGHT: 63 IN

## 2024-05-17 VITALS
BODY MASS INDEX: 28.88 KG/M2 | OXYGEN SATURATION: 95 % | WEIGHT: 163 LBS | RESPIRATION RATE: 18 BRPM | HEART RATE: 82 BPM | SYSTOLIC BLOOD PRESSURE: 110 MMHG | DIASTOLIC BLOOD PRESSURE: 56 MMHG | TEMPERATURE: 98 F | HEIGHT: 63 IN

## 2024-05-17 DIAGNOSIS — R11.10 VOMITING, UNSPECIFIED VOMITING TYPE, UNSPECIFIED WHETHER NAUSEA PRESENT: ICD-10-CM

## 2024-05-17 DIAGNOSIS — R10.9 ABDOMINAL PAIN, UNSPECIFIED ABDOMINAL LOCATION: Primary | ICD-10-CM

## 2024-05-17 DIAGNOSIS — D72.829 LEUKOCYTOSIS, UNSPECIFIED TYPE: ICD-10-CM

## 2024-05-17 DIAGNOSIS — R00.0 TACHYCARDIA: ICD-10-CM

## 2024-05-17 DIAGNOSIS — R10.84 GENERALIZED ABDOMINAL PAIN: ICD-10-CM

## 2024-05-17 DIAGNOSIS — E86.0 DEHYDRATION: ICD-10-CM

## 2024-05-17 DIAGNOSIS — K52.9 ENTERITIS: Primary | ICD-10-CM

## 2024-05-17 DIAGNOSIS — R19.7 DIARRHEA OF PRESUMED INFECTIOUS ORIGIN: ICD-10-CM

## 2024-05-17 DIAGNOSIS — R11.2 NAUSEA AND VOMITING, UNSPECIFIED VOMITING TYPE: ICD-10-CM

## 2024-05-17 LAB
ALBUMIN SERPL BCP-MCNC: 4.5 G/DL (ref 3.5–5.2)
ALP SERPL-CCNC: 173 U/L (ref 55–135)
ALT SERPL W/O P-5'-P-CCNC: 35 U/L (ref 10–44)
ANION GAP SERPL CALC-SCNC: 11 MMOL/L (ref 8–16)
AST SERPL-CCNC: 20 U/L (ref 10–40)
BACTERIA #/AREA URNS HPF: NORMAL /HPF
BASOPHILS # BLD AUTO: 0.02 K/UL (ref 0–0.2)
BASOPHILS NFR BLD: 0.1 % (ref 0–1.9)
BILIRUB SERPL-MCNC: 0.6 MG/DL (ref 0.1–1)
BILIRUB UR QL STRIP: NEGATIVE
BUN SERPL-MCNC: 14 MG/DL (ref 6–20)
CALCIUM SERPL-MCNC: 9.8 MG/DL (ref 8.7–10.5)
CHLORIDE SERPL-SCNC: 108 MMOL/L (ref 95–110)
CLARITY UR: CLEAR
CO2 SERPL-SCNC: 20 MMOL/L (ref 23–29)
COLOR UR: YELLOW
CREAT SERPL-MCNC: 1.5 MG/DL (ref 0.5–1.4)
CTP QC/QA: YES
DIFFERENTIAL METHOD BLD: ABNORMAL
EOSINOPHIL # BLD AUTO: 0 K/UL (ref 0–0.5)
EOSINOPHIL NFR BLD: 0 % (ref 0–8)
ERYTHROCYTE [DISTWIDTH] IN BLOOD BY AUTOMATED COUNT: 12.1 % (ref 11.5–14.5)
EST. GFR  (NO RACE VARIABLE): >60 ML/MIN/1.73 M^2
FLUAV AG NPH QL: NEGATIVE
FLUBV AG NPH QL: NEGATIVE
GLUCOSE SERPL-MCNC: 111 MG/DL (ref 70–110)
GLUCOSE UR QL STRIP: NEGATIVE
HCT VFR BLD AUTO: 52.4 % (ref 40–54)
HGB BLD-MCNC: 17.6 G/DL (ref 14–18)
HGB UR QL STRIP: NEGATIVE
HYALINE CASTS #/AREA URNS LPF: 0 /LPF
IMM GRANULOCYTES # BLD AUTO: 0.06 K/UL (ref 0–0.04)
IMM GRANULOCYTES NFR BLD AUTO: 0.3 % (ref 0–0.5)
KETONES UR QL STRIP: NEGATIVE
LEUKOCYTE ESTERASE UR QL STRIP: NEGATIVE
LIPASE SERPL-CCNC: 15 U/L (ref 4–60)
LYMPHOCYTES # BLD AUTO: 0.5 K/UL (ref 1–4.8)
LYMPHOCYTES NFR BLD: 2.8 % (ref 18–48)
MCH RBC QN AUTO: 31.4 PG (ref 27–31)
MCHC RBC AUTO-ENTMCNC: 33.6 G/DL (ref 32–36)
MCV RBC AUTO: 93 FL (ref 82–98)
MICROSCOPIC COMMENT: NORMAL
MONOCYTES # BLD AUTO: 0.8 K/UL (ref 0.3–1)
MONOCYTES NFR BLD: 4.6 % (ref 4–15)
NEUTROPHILS # BLD AUTO: 15.8 K/UL (ref 1.8–7.7)
NEUTROPHILS NFR BLD: 92.2 % (ref 38–73)
NITRITE UR QL STRIP: NEGATIVE
NRBC BLD-RTO: 0 /100 WBC
PH UR STRIP: 6 [PH] (ref 5–8)
PLATELET # BLD AUTO: 298 K/UL (ref 150–450)
PMV BLD AUTO: 9.7 FL (ref 9.2–12.9)
POTASSIUM SERPL-SCNC: 4 MMOL/L (ref 3.5–5.1)
PROT SERPL-MCNC: 8.5 G/DL (ref 6–8.4)
PROT UR QL STRIP: ABNORMAL
RBC # BLD AUTO: 5.61 M/UL (ref 4.6–6.2)
RBC #/AREA URNS HPF: 1 /HPF (ref 0–4)
S PYO RRNA THROAT QL PROBE: NEGATIVE
SARS-COV-2 AG RESP QL IA.RAPID: NEGATIVE
SODIUM SERPL-SCNC: 139 MMOL/L (ref 136–145)
SP GR UR STRIP: >1.03 (ref 1–1.03)
SQUAMOUS #/AREA URNS HPF: 0 /HPF
URN SPEC COLLECT METH UR: ABNORMAL
UROBILINOGEN UR STRIP-ACNC: NEGATIVE EU/DL
WBC # BLD AUTO: 17.19 K/UL (ref 3.9–12.7)
WBC #/AREA URNS HPF: 2 /HPF (ref 0–5)

## 2024-05-17 PROCEDURE — 96361 HYDRATE IV INFUSION ADD-ON: CPT

## 2024-05-17 PROCEDURE — 83690 ASSAY OF LIPASE: CPT | Performed by: NURSE PRACTITIONER

## 2024-05-17 PROCEDURE — 25000003 PHARM REV CODE 250: Performed by: EMERGENCY MEDICINE

## 2024-05-17 PROCEDURE — 93000 ELECTROCARDIOGRAM COMPLETE: CPT | Mod: S$GLB,,, | Performed by: EMERGENCY MEDICINE

## 2024-05-17 PROCEDURE — 80053 COMPREHEN METABOLIC PANEL: CPT | Performed by: NURSE PRACTITIONER

## 2024-05-17 PROCEDURE — 36415 COLL VENOUS BLD VENIPUNCTURE: CPT | Performed by: NURSE PRACTITIONER

## 2024-05-17 PROCEDURE — 99285 EMERGENCY DEPT VISIT HI MDM: CPT | Mod: 25

## 2024-05-17 PROCEDURE — 87804 INFLUENZA ASSAY W/OPTIC: CPT | Mod: QW,,, | Performed by: NURSE PRACTITIONER

## 2024-05-17 PROCEDURE — 63600175 PHARM REV CODE 636 W HCPCS: Performed by: EMERGENCY MEDICINE

## 2024-05-17 PROCEDURE — 96374 THER/PROPH/DIAG INJ IV PUSH: CPT

## 2024-05-17 PROCEDURE — 99214 OFFICE O/P EST MOD 30 MIN: CPT | Mod: S$GLB,,, | Performed by: NURSE PRACTITIONER

## 2024-05-17 PROCEDURE — 93010 ELECTROCARDIOGRAM REPORT: CPT | Mod: ,,, | Performed by: GENERAL PRACTICE

## 2024-05-17 PROCEDURE — 81000 URINALYSIS NONAUTO W/SCOPE: CPT | Performed by: NURSE PRACTITIONER

## 2024-05-17 PROCEDURE — 93005 ELECTROCARDIOGRAM TRACING: CPT

## 2024-05-17 PROCEDURE — 25500020 PHARM REV CODE 255

## 2024-05-17 PROCEDURE — 87811 SARS-COV-2 COVID19 W/OPTIC: CPT | Mod: QW,S$GLB,, | Performed by: NURSE PRACTITIONER

## 2024-05-17 PROCEDURE — 87880 STREP A ASSAY W/OPTIC: CPT | Mod: QW,,, | Performed by: NURSE PRACTITIONER

## 2024-05-17 PROCEDURE — 85025 COMPLETE CBC W/AUTO DIFF WBC: CPT | Performed by: NURSE PRACTITIONER

## 2024-05-17 RX ORDER — KETOROLAC TROMETHAMINE 30 MG/ML
10 INJECTION, SOLUTION INTRAMUSCULAR; INTRAVENOUS
Status: COMPLETED | OUTPATIENT
Start: 2024-05-17 | End: 2024-05-17

## 2024-05-17 RX ORDER — AZITHROMYCIN 500 MG/1
500 TABLET, FILM COATED ORAL DAILY
Qty: 5 TABLET | Refills: 0 | Status: SHIPPED | OUTPATIENT
Start: 2024-05-17 | End: 2024-05-19 | Stop reason: SDUPTHER

## 2024-05-17 RX ORDER — ONDANSETRON 4 MG/1
4 TABLET, ORALLY DISINTEGRATING ORAL
Status: COMPLETED | OUTPATIENT
Start: 2024-05-17 | End: 2024-05-17

## 2024-05-17 RX ORDER — ONDANSETRON 4 MG/1
4 TABLET, ORALLY DISINTEGRATING ORAL EVERY 8 HOURS PRN
Qty: 15 TABLET | Refills: 0 | Status: SHIPPED | OUTPATIENT
Start: 2024-05-17 | End: 2024-05-19 | Stop reason: SDUPTHER

## 2024-05-17 RX ORDER — DICYCLOMINE HYDROCHLORIDE 20 MG/1
20 TABLET ORAL 2 TIMES DAILY
Qty: 20 TABLET | Refills: 0 | Status: SHIPPED | OUTPATIENT
Start: 2024-05-17 | End: 2024-05-19 | Stop reason: SDUPTHER

## 2024-05-17 RX ORDER — ONDANSETRON HYDROCHLORIDE 2 MG/ML
4 INJECTION, SOLUTION INTRAVENOUS
Status: DISCONTINUED | OUTPATIENT
Start: 2024-05-17 | End: 2024-05-17

## 2024-05-17 RX ADMIN — SODIUM CHLORIDE 1000 ML: 9 INJECTION, SOLUTION INTRAVENOUS at 09:05

## 2024-05-17 RX ADMIN — IOHEXOL 75 ML: 350 INJECTION, SOLUTION INTRAVENOUS at 09:05

## 2024-05-17 RX ADMIN — ONDANSETRON 4 MG: 4 TABLET, ORALLY DISINTEGRATING ORAL at 03:05

## 2024-05-17 RX ADMIN — KETOROLAC TROMETHAMINE 10 MG: 30 INJECTION, SOLUTION INTRAMUSCULAR at 09:05

## 2024-05-17 NOTE — PROGRESS NOTES
"Subjective:      Patient ID: Gonsalo Mei III is a 28 y.o. male.    Vitals:  height is 5' 3" (1.6 m) and weight is 73.9 kg (163 lb). His oral temperature is 98.9 °F (37.2 °C). His blood pressure is 134/86 and his pulse is 117 (abnormal). His respiration is 18 and oxygen saturation is 98%.     Chief Complaint: Emesis    28-year-old male presents with abdominal pain and persistent vomiting. He is limited in communication with history due to history of cerebral atrophy. He also has a past medical history of epilepsy. He is here with his mother. She states that his vomiting and abdominal pain started today. Denies any fever.     Emesis   This is a new problem. The current episode started today. The problem has been rapidly worsening. The emesis has an appearance of stomach contents. There has been no fever. Associated symptoms include abdominal pain and diarrhea.       Gastrointestinal:  Positive for abdominal pain, nausea, vomiting and diarrhea. Negative for abdominal trauma, abdominal bloating, history of abdominal surgery and constipation.      Objective:     Physical Exam   Constitutional: He is oriented to person, place, and time. He appears well-developed.   HENT:   Head: Normocephalic and atraumatic.   Ears:   Right Ear: External ear normal.   Left Ear: External ear normal.   Nose: Nose normal. No rhinorrhea or congestion.   Mouth/Throat: Mucous membranes are normal. Mucous membranes are moist. Oropharyngeal exudate present. No posterior oropharyngeal erythema.   Eyes: Conjunctivae and lids are normal. Pupils are equal, round, and reactive to light.   Neck: Trachea normal. Neck supple.   Cardiovascular: Normal rate, regular rhythm and normal heart sounds.   Pulmonary/Chest: Effort normal and breath sounds normal. No respiratory distress. He has no wheezes.   Abdominal: Normal appearance and bowel sounds are normal. He exhibits no distension and no mass. Soft. There is abdominal tenderness (generalized " tenderness upon palpation). There is no left CVA tenderness and no right CVA tenderness.   Musculoskeletal: Normal range of motion.         General: Normal range of motion.   Neurological: He is alert and oriented to person, place, and time. He has normal strength.   Skin: Skin is warm, dry, intact, not diaphoretic and not pale.   Psychiatric: His speech is normal and behavior is normal. Judgment and thought content normal.   Nursing note and vitals reviewed.      Assessment:     1. Abdominal pain, unspecified abdominal location    2. Vomiting, unspecified vomiting type, unspecified whether nausea present    3. Tachycardia        Plan:     EKG - NSR, HR 92, No ST changes   Zofran given - vomited once in clinic and then subsided after medication  Mild tachycardia at 117    Unable to rule out acute abdominal process. Abdominal tenderness noted on exam. Advised for him to have further evaluation in the ED for abdominal pain. Mother verbalized understanding and stated she would take him to [facility] ED for further evaluation. Refused EMS transportation.    Abdominal pain, unspecified abdominal location    Vomiting, unspecified vomiting type, unspecified whether nausea present  -     SARS Coronavirus 2 Antigen, POCT Manual Read  -     POCT Influenza A/B Rapid Antigen  -     POCT rapid strep A    Tachycardia    Other orders  -     ondansetron disintegrating tablet 4 mg

## 2024-05-17 NOTE — FIRST PROVIDER EVALUATION
Emergency Department TeleTriage Encounter Note      CHIEF COMPLAINT    Chief Complaint   Patient presents with    Nausea    Vomiting     Abdominal Pain and vomiting       VITAL SIGNS   Initial Vitals [05/17/24 1701]   BP Pulse Resp Temp SpO2   139/74 (!) 117 20 97.9 °F (36.6 °C) 98 %      MAP       --            ALLERGIES    Review of patient's allergies indicates:   Allergen Reactions    Levetiracetam Hallucinations and Other (See Comments)    Tamiflu [oseltamivir] Other (See Comments)     seizures       PROVIDER TRIAGE NOTE  Verbal consent for the teletriage evaluation was given by the patient at the start of the evaluation.  All efforts will be made to maintain patient's privacy during the evaluation.      This is a teletriage evaluation of a 28 y.o. male presenting to the ED per mother with c/o abd pain, diarrhea, and vomiting that started yesterday and worsened today.  Seen at  and sent to the ED for further evaluation.  Patient needs verbal assistance with mother.  Limited physical exam via telehealth: The patient is awake, alert and is not in respiratory distress.  As the Teletriage provider, I performed an initial assessment and ordered appropriate labs and imaging studies, if any, to facilitate the patient's care once placed in the ED. Once a room is available, care and a full evaluation will be completed by an alternate ED provider.  Any additional orders and the final disposition will be determined by that provider.  All imaging and labs will not be followed-up by the Teletriage Team, including myself.          ORDERS  Labs Reviewed - No data to display    ED Orders (720h ago, onward)      Start Ordered     Status Ordering Provider    05/17/24 1710 05/17/24 1709  Vital signs  Every 2 hours         Ordered ALFA HECK    05/17/24 1709 05/17/24 1709  Diet NPO  Diet effective now         Ordered ALFA HECK    05/17/24 1709 05/17/24 1709  Insert peripheral IV  Once         Ordered ALFA HECK     05/17/24 1709 05/17/24 1709  CBC W/ AUTO DIFFERENTIAL  STAT         Ordered ALFA HECK    05/17/24 1709 05/17/24 1709  Comp. Metabolic Panel  STAT         Ordered ALFA HECK    05/17/24 1709 05/17/24 1709  Lipase  STAT         Ordered ALFA HECK    05/17/24 1709 05/17/24 1709  Urinalysis, Reflex to Urine Culture Urine, Clean Catch  STAT         Ordered ALFA HECK    05/17/24 1709 05/17/24 1709  EKG 12-lead  Once         Ordered ALFA HECK              Virtual Visit Note: The provider triage portion of this emergency department evaluation and documentation was performed via [employer], a HIPAA-compliant telemedicine application, in concert with a tele-presenter in the room. A face to face patient evaluation with one of my colleagues will occur once the patient is placed in an emergency department room.      DISCLAIMER: This note was prepared with [employer] voice recognition transcription software. Garbled syntax, mangled pronouns, and other bizarre constructions may be attributed to that software system.

## 2024-05-17 NOTE — PATIENT INSTRUCTIONS
Please present to the emergency room for further evaluation of abdominal pain and tenderness with vomiting.

## 2024-05-18 DIAGNOSIS — K64.8 OTHER HEMORRHOIDS: ICD-10-CM

## 2024-05-18 NOTE — ED PROVIDER NOTES
Encounter Date: 5/17/2024       History     Chief Complaint   Patient presents with    Nausea    Vomiting     Abdominal Pain and vomiting     Emergent evaluation 28 y.o. male with history of cerebral atrophy and epilepsy presenting to the ED per mother with c/o abd pain, diarrhea, and vomiting that started yesterday and worsened today.  Mother reports proximally 4 episodes of diarrhea yesterday.  More episodes of diarrhea today no hematochezia or melanotic stool.  Vomiting began today with 2 times since he returned from day center he was also reportedly vomited at the day center but she was unsure of how many times no blood in the emesis.  He did eat lunch but did not eat dinner he reports he does have an appetite.  Abdominal pain is constant since yesterday and generalized unable to describe.    Seen at  and sent to the ED for further evaluation.  Patient was given nausea medicine which she reports he was help the nausea.  Patient needs verbal assistance with mother.    No fever chills sweats reports he was having pain with urination.  No other complaints  Past history of kidney stones      Review of patient's allergies indicates:   Allergen Reactions    Levetiracetam Hallucinations and Other (See Comments)    Tamiflu [oseltamivir] Other (See Comments)     seizures     Past Medical History:   Diagnosis Date    Cerebellar atrophy     by MRI    Epilepsy      Past Surgical History:   Procedure Laterality Date    TONSILLECTOMY      vagal nerve stimulator (2004) Left 2004     Family History   Problem Relation Name Age of Onset    Asthma Mother      Diabetes Paternal Aunt      Heart disease Paternal Aunt      Hypertension Paternal Aunt      Diabetes Maternal Grandmother      Early death Maternal Grandmother      Heart disease Maternal Grandmother      Hypertension Maternal Grandmother      Stroke Maternal Grandmother      Cancer Maternal Grandmother      Heart disease Maternal Grandfather      Hypertension Maternal  Grandfather       Social History     Tobacco Use    Smoking status: Never   Substance Use Topics    Alcohol use: Never    Drug use: Never     Review of Systems   Constitutional:  Negative for activity change, appetite change, chills, diaphoresis, fatigue and fever.   HENT:  Negative for congestion, postnasal drip and rhinorrhea.    Respiratory:  Negative for cough, chest tightness, shortness of breath and wheezing.    Cardiovascular:  Negative for chest pain and palpitations.   Gastrointestinal:  Positive for abdominal pain, diarrhea, nausea and vomiting. Negative for constipation.   Genitourinary:  Negative for dysuria, frequency and urgency.   Musculoskeletal:  Negative for neck pain and neck stiffness.   Neurological:  Negative for dizziness, weakness, light-headedness, numbness and headaches.   Psychiatric/Behavioral:  Negative for confusion.    All other systems reviewed and are negative.      Physical Exam     Initial Vitals [05/17/24 1701]   BP Pulse Resp Temp SpO2   139/74 (!) 117 20 97.9 °F (36.6 °C) 98 %      MAP       --         Physical Exam    Nursing note and vitals reviewed.  Constitutional: He appears well-developed and well-nourished. He is not diaphoretic. No distress.   HENT:   Head: Normocephalic and atraumatic.   Right Ear: External ear normal.   Left Ear: External ear normal.   Nose: Nose normal.   Mouth/Throat: Oropharynx is clear and moist.   Eyes: Conjunctivae and EOM are normal. Pupils are equal, round, and reactive to light.   Neck: Neck supple. No tracheal deviation present.   Normal range of motion.  Cardiovascular:  Normal rate, regular rhythm, normal heart sounds and intact distal pulses.     Exam reveals no gallop and no friction rub.       No murmur heard.  Pulmonary/Chest: Breath sounds normal. No stridor. No respiratory distress. He has no wheezes. He has no rhonchi. He has no rales. He exhibits no tenderness.   Abdominal: Abdomen is soft. Bowel sounds are normal. He exhibits no  distension and no mass. There is abdominal tenderness.   Generalized tenderness throughout the abdomen no rebound or guarding normal bowel sounds. There is no rebound and no guarding.   Musculoskeletal:         General: No edema. Normal range of motion.      Cervical back: Normal range of motion and neck supple.     Neurological: He is alert and oriented to person, place, and time. He has normal strength. No cranial nerve deficit or sensory deficit.   Skin: Skin is warm and dry. No rash noted. No erythema. No pallor.   Psychiatric: He has a normal mood and affect. His behavior is normal. Judgment and thought content normal.         ED Course   Procedures  Labs Reviewed   CBC W/ AUTO DIFFERENTIAL - Abnormal; Notable for the following components:       Result Value    WBC 17.19 (*)     MCH 31.4 (*)     Gran # (ANC) 15.8 (*)     Immature Grans (Abs) 0.06 (*)     Lymph # 0.5 (*)     Gran % 92.2 (*)     Lymph % 2.8 (*)     All other components within normal limits    Narrative:     Collection has been rescheduled by [facility] at 05/17/2024 19:05 Reason:   Unable to collect. Waiting for room to be stuck   COMPREHENSIVE METABOLIC PANEL - Abnormal; Notable for the following components:    CO2 20 (*)     Glucose 111 (*)     Creatinine 1.5 (*)     Total Protein 8.5 (*)     Alkaline Phosphatase 173 (*)     All other components within normal limits    Narrative:     Collection has been rescheduled by [facility] at 05/17/2024 19:05 Reason:   Unable to collect. Waiting for room to be stuck   URINALYSIS, REFLEX TO URINE CULTURE - Abnormal; Notable for the following components:    Specific Gravity, UA >1.030 (*)     Protein, UA 1+ (*)     All other components within normal limits    Narrative:     Specimen Source->Urine   LIPASE    Narrative:     Collection has been rescheduled by [facility] at 05/17/2024 19:05 Reason:   Unable to collect. Waiting for room to be stuck   URINALYSIS MICROSCOPIC    Narrative:     Specimen Source->Urine           Imaging Results              CT Abdomen Pelvis With IV Contrast NO Oral Contrast (In process)                      Medications   sodium chloride 0.9% bolus 1,000 mL 1,000 mL (0 mLs Intravenous Stopped 5/17/24 2216)   ketorolac injection 9.999 mg (9.999 mg Intravenous Given 5/17/24 2108)   iohexoL (OMNIPAQUE 350) 350 mg iodine/mL injection (75 mLs Intravenous Given 5/17/24 2132)     Medical Decision Making  Emergent evaluation 28 y.o. male with history of cerebral atrophy and epilepsy presenting to the ED per mother with c/o abd pain, diarrhea, and vomiting that started yesterday and worsened today.  Mother reports proximally 4 episodes of diarrhea yesterday.  More episodes of diarrhea today no hematochezia or melanotic stool.  Vomiting began today with 2 times since he returned from day center he was also reportedly vomited at the day center but she was unsure of how many times no blood in the emesis.  He did eat lunch but did not eat dinner he reports he does have an appetite.  Abdominal pain is constant since yesterday and generalized unable to describe.    Seen at  and sent to the ED for further evaluation.  Patient was given nausea medicine which she reports he was help the nausea.  Patient needs verbal assistance with mother.    No fever chills sweats reports he was having pain with urination.  No other complaints  On physical exam patient was in no distress blood pressure 121/55 pulse 95 temp 97.9° respirations 18 sats 95% on room air clear breath sounds bilaterally normal cardiac exam soft abdomen reports tenderness throughout the abdomen but no rebound or guarding patient requires help by his mother to answer questions but he was able to answer yes no questions and appears to be reliable.  MDM    Patient presents for emergent evaluation of acute 2 days generalized constant abdominal pain with nausea vomiting diarrhea that poses a threat to life and/or bodily function.   Differential diagnosis includes  but was not limited to acute pancreatitis, acute cholecystitis and cholangitis, colitis, acute appendicitis, urinary tract infection,  testicular torsion, epididymitis and orchitis, prostatitis, pyelonephritis, kidney stone, volvulus, small bowel obstruction, enteritis, gastritis, colitis, mesenteric ischemia, AAA, AAA rupture, aortic dissection, constipation, upper or lower gi bleeding, traumatic injury.     In the ED patient found to have acute enteritis with nausea vomiting diarrhea leukocytosis, dehydration  I ordered labs and personally reviewed them.  Labs significant for see below   I ordered CT scan and personally reviewed it and reviewed the radiologist interpretation.  CT significant for MPRESSION: 1. Thickened small bowel loops in left upper quadrant and left lower quadrant suggestive of infectious or inflammatory enteritis. 2. Normal appendix is detected.    .      Discharge MDM     Patient was managed in the ED with IV 1 L normal saline 10 mg of Toradol.  Patient reports symptoms are improved will discharge home with azithromycin Bentyl and Zofran  The response to treatment was good.    Patient was discharged in stable condition.  Detailed return precautions discussed.  Patient was told to follow up with primary care physician or specialist based on their diagnosis  Jennifer Casey MD      Amount and/or Complexity of Data Reviewed  Labs: ordered. Decision-making details documented in ED Course.  Radiology: ordered.    Risk  Prescription drug management.               ED Course as of 05/17/24 2241   Fri May 17, 2024   2054 White count 17.19 ANC 15.8 otherwise normal CBC  Urine with 1+ protein increased specific gravity otherwise normal [RM]   2159 CMP shows bicarb 20 glucose 111 creatinine 1.5 which is new for the patient alk-phos 173  Lipase normal [RM]      ED Course User Index  [RM] Jennifer Casey MD                             Clinical Impression:  Final diagnoses:  [R00.0]  Tachycardia  [R11.2] Nausea and vomiting, unspecified vomiting type  [R19.7] Diarrhea of presumed infectious origin  [R10.84] Generalized abdominal pain  [D72.829] Leukocytosis, unspecified type  [K52.9] Enteritis (Primary)  [E86.0] Dehydration          ED Disposition Condition    Discharge Stable          ED Prescriptions       Medication Sig Dispense Start Date End Date Auth. Provider    azithromycin (ZITHROMAX) 500 MG tablet Take 1 tablet (500 mg total) by mouth once daily. Take 2 tablets by mouth on day 1; Take 1 tablet by mouth on days 2-5 for 5 days 5 tablet 5/17/2024 5/22/2024 Jennifer Casey MD    dicyclomine (BENTYL) 20 mg tablet Take 1 tablet (20 mg total) by mouth 2 (two) times daily. 20 tablet 5/17/2024 6/16/2024 Jennifer Casey MD    ondansetron (ZOFRAN-ODT) 4 MG TbDL Take 1 tablet (4 mg total) by mouth every 8 (eight) hours as needed (Nausea and vomiting). 15 tablet 5/17/2024 -- Jennifer Casey MD          Follow-up Information    None          Jennifer Casey MD  05/17/24 6392

## 2024-05-19 ENCOUNTER — OFFICE VISIT (OUTPATIENT)
Dept: URGENT CARE | Facility: CLINIC | Age: 29
End: 2024-05-19
Payer: MEDICAID

## 2024-05-19 VITALS
TEMPERATURE: 99 F | SYSTOLIC BLOOD PRESSURE: 121 MMHG | OXYGEN SATURATION: 97 % | RESPIRATION RATE: 16 BRPM | WEIGHT: 163 LBS | BODY MASS INDEX: 28.88 KG/M2 | HEIGHT: 63 IN | DIASTOLIC BLOOD PRESSURE: 79 MMHG | HEART RATE: 55 BPM

## 2024-05-19 DIAGNOSIS — K52.9 GASTROENTERITIS: ICD-10-CM

## 2024-05-19 DIAGNOSIS — R19.7 NAUSEA VOMITING AND DIARRHEA: Primary | ICD-10-CM

## 2024-05-19 DIAGNOSIS — R10.9 ABDOMINAL CRAMPING: ICD-10-CM

## 2024-05-19 DIAGNOSIS — R11.2 NAUSEA VOMITING AND DIARRHEA: Primary | ICD-10-CM

## 2024-05-19 PROCEDURE — 99214 OFFICE O/P EST MOD 30 MIN: CPT | Mod: S$GLB,,, | Performed by: NURSE PRACTITIONER

## 2024-05-19 RX ORDER — AZITHROMYCIN 500 MG/1
500 TABLET, FILM COATED ORAL DAILY
Qty: 5 TABLET | Refills: 0 | Status: SHIPPED | OUTPATIENT
Start: 2024-05-19 | End: 2024-05-24

## 2024-05-19 RX ORDER — ONDANSETRON 4 MG/1
4 TABLET, ORALLY DISINTEGRATING ORAL EVERY 8 HOURS PRN
Qty: 15 TABLET | Refills: 0 | Status: SHIPPED | OUTPATIENT
Start: 2024-05-19

## 2024-05-19 RX ORDER — DICYCLOMINE HYDROCHLORIDE 20 MG/1
20 TABLET ORAL 2 TIMES DAILY
Qty: 20 TABLET | Refills: 0 | Status: SHIPPED | OUTPATIENT
Start: 2024-05-19 | End: 2024-06-18

## 2024-05-19 NOTE — PROGRESS NOTES
"Subjective:      Patient ID: Gonsalo Mei III is a 28 y.o. male.    Vitals:  height is 5' 3" (1.6 m) and weight is 73.9 kg (163 lb). His oral temperature is 98.5 °F (36.9 °C). His blood pressure is 121/79 and his pulse is 55 (abnormal). His respiration is 16 and oxygen saturation is 97%.     Chief Complaint: Diarrhea    Medication request.  Patient was seen at ER but diagnosis codes were left off of RX's.    Diarrhea   This is a new problem. The current episode started in the past 7 days. Associated symptoms include abdominal pain and vomiting. Pertinent negatives include no fever.       Constitution: Negative for fever.   Gastrointestinal:  Positive for abdominal pain, nausea, vomiting, diarrhea and rectal pain. Negative for bright red blood in stool and dark colored stools.      Objective:     Physical Exam   HENT:   Mouth/Throat: Mucous membranes are moist.   Abdominal: Normal appearance. He exhibits distension. He exhibits no mass. There is no abdominal tenderness. There is no guarding.   Neurological: He is alert.   Nursing note and vitals reviewed.      Assessment:     1. Nausea vomiting and diarrhea    2. Abdominal cramping    3. Gastroenteritis        Plan:       Nausea vomiting and diarrhea  -     azithromycin (ZITHROMAX) 500 MG tablet; Take 1 tablet (500 mg total) by mouth once daily. Take 2 tablets by mouth on day 1; Take 1 tablet by mouth on days 2-5 for 5 days  Dispense: 5 tablet; Refill: 0  -     dicyclomine (BENTYL) 20 mg tablet; Take 1 tablet (20 mg total) by mouth 2 (two) times daily.  Dispense: 20 tablet; Refill: 0  -     ondansetron (ZOFRAN-ODT) 4 MG TbDL; Take 1 tablet (4 mg total) by mouth every 8 (eight) hours as needed (Nausea and vomiting).  Dispense: 15 tablet; Refill: 0    Abdominal cramping  -     azithromycin (ZITHROMAX) 500 MG tablet; Take 1 tablet (500 mg total) by mouth once daily. Take 2 tablets by mouth on day 1; Take 1 tablet by mouth on days 2-5 for 5 days  Dispense: 5 tablet; " Refill: 0  -     dicyclomine (BENTYL) 20 mg tablet; Take 1 tablet (20 mg total) by mouth 2 (two) times daily.  Dispense: 20 tablet; Refill: 0  -     ondansetron (ZOFRAN-ODT) 4 MG TbDL; Take 1 tablet (4 mg total) by mouth every 8 (eight) hours as needed (Nausea and vomiting).  Dispense: 15 tablet; Refill: 0    Gastroenteritis  -     azithromycin (ZITHROMAX) 500 MG tablet; Take 1 tablet (500 mg total) by mouth once daily. Take 2 tablets by mouth on day 1; Take 1 tablet by mouth on days 2-5 for 5 days  Dispense: 5 tablet; Refill: 0  -     dicyclomine (BENTYL) 20 mg tablet; Take 1 tablet (20 mg total) by mouth 2 (two) times daily.  Dispense: 20 tablet; Refill: 0  -     ondansetron (ZOFRAN-ODT) 4 MG TbDL; Take 1 tablet (4 mg total) by mouth every 8 (eight) hours as needed (Nausea and vomiting).  Dispense: 15 tablet; Refill: 0    Pt presents to  returning for N/V/D. He was seen here and sent to ER for further workup. He had CT showing likely gastroenteritis and d/miriam with azithromycin, bentyl and zofran. Unfortunately the pharmacy would not fill Rx without diagnosis code nor attempt to reach out for diagnosis codes. Mother states symptoms have not worsened but not improved. His mucous membranes are moist, abdomen benign and no tachycardia. Will reorder Rx with diagnosis codes. Return precautions given.

## 2024-05-20 LAB
OHS QRS DURATION: 72 MS
OHS QTC CALCULATION: 427 MS

## 2024-05-20 RX ORDER — HYDROCORTISONE 25 MG/G
CREAM TOPICAL
Qty: 28 G | Refills: 0 | Status: SHIPPED | OUTPATIENT
Start: 2024-05-20

## 2024-05-24 ENCOUNTER — TELEPHONE (OUTPATIENT)
Dept: GASTROENTEROLOGY | Facility: CLINIC | Age: 29
End: 2024-05-24
Payer: MEDICAID

## 2024-05-24 NOTE — TELEPHONE ENCOUNTER
Left a message for the patient's mother to call back about scheduling an appt.    ----- Message from Meli Blandon MA sent at 5/24/2024  9:26 AM CDT -----    ----- Message -----  From: Cristiane Salgado  Sent: 5/24/2024   9:02 AM CDT  To: [facility] Staff    Type:  Sooner Appointment Request    Caller is requesting a sooner appointment.  Caller declined first available appointment listed below.  Caller will not accept being placed on the waitlist and is requesting a message be sent to doctor.  Name of Caller:Crys (pt mother)  When is the first available appointment?books closed   Symptoms:ER f/u  Would the patient rather a call back or a response via [facility]? call  Best Call Back Number:[phone]   Additional Information:

## 2024-05-29 ENCOUNTER — TELEPHONE (OUTPATIENT)
Dept: GASTROENTEROLOGY | Facility: CLINIC | Age: 29
End: 2024-05-29
Payer: MEDICAID

## 2024-05-29 NOTE — TELEPHONE ENCOUNTER
Changed appt to July 5th virtual      ----- Message from Meli Blandon MA sent at 5/29/2024  2:29 PM CDT -----  Regarding: FW: sooner appt  Contact: Ms. Spring @ [phone]    ----- Message -----  From: Isaac Hamilton  Sent: 5/29/2024   2:18 PM CDT  To: [facility] Staff  Subject: sooner appt                                      Pt's mom is calling to have pt scheduled for sooner appt than 09/27 as this is for hospital follow up for a stomach virus. Please call to advise further. Thank you for all you are doing.

## 2024-06-25 ENCOUNTER — OFFICE VISIT (OUTPATIENT)
Dept: URGENT CARE | Facility: CLINIC | Age: 29
End: 2024-06-25
Payer: MEDICAID

## 2024-06-25 VITALS
BODY MASS INDEX: 28.88 KG/M2 | WEIGHT: 163 LBS | DIASTOLIC BLOOD PRESSURE: 75 MMHG | SYSTOLIC BLOOD PRESSURE: 129 MMHG | HEIGHT: 63 IN | RESPIRATION RATE: 18 BRPM | HEART RATE: 76 BPM | TEMPERATURE: 98 F | OXYGEN SATURATION: 99 %

## 2024-06-25 DIAGNOSIS — L03.032 INFECTION OF NAIL BED OF TOE OF LEFT FOOT: Primary | ICD-10-CM

## 2024-06-25 PROCEDURE — 99214 OFFICE O/P EST MOD 30 MIN: CPT | Mod: S$GLB,,,

## 2024-06-25 RX ORDER — CEPHALEXIN 500 MG/1
500 CAPSULE ORAL EVERY 6 HOURS
Qty: 20 CAPSULE | Refills: 0 | Status: SHIPPED | OUTPATIENT
Start: 2024-06-25 | End: 2024-06-30

## 2024-06-25 RX ORDER — MUPIROCIN 20 MG/G
OINTMENT TOPICAL 3 TIMES DAILY
Qty: 30 G | Refills: 0 | Status: SHIPPED | OUTPATIENT
Start: 2024-06-25

## 2024-06-25 RX ORDER — CEPHALEXIN 500 MG/1
500 CAPSULE ORAL EVERY 6 HOURS
Qty: 20 CAPSULE | Refills: 0 | Status: SHIPPED | OUTPATIENT
Start: 2024-06-25 | End: 2024-06-25

## 2024-06-25 RX ORDER — MUPIROCIN 20 MG/G
OINTMENT TOPICAL 3 TIMES DAILY
Qty: 30 G | Refills: 0 | Status: SHIPPED | OUTPATIENT
Start: 2024-06-25 | End: 2024-06-25

## 2024-06-25 NOTE — PROGRESS NOTES
"Subjective:      Patient ID: Gonsalo Mei III is a 28 y.o. male.    Vitals:  height is 5' 3" (1.6 m) and weight is 73.9 kg (163 lb). His oral temperature is 98.2 °F (36.8 °C). His blood pressure is 129/75 and his pulse is 76. His respiration is 18 and oxygen saturation is 99%.     Chief Complaint: Toe Pain    Pt mom states left toe has been swollen and discharging. Pt states he hit is toe. Pt mom states this happened about a week ago.  Mom reports he has a an appointment with Podiatry on July 3rd but due to his pain she wanted to bring him in sooner.  Discussed round of antibiotics and follow up with podiatrist as scheduled.        Constitution: Negative for chills, fatigue and fever.   HENT: Negative.     Cardiovascular: Negative.    Respiratory: Negative.     Gastrointestinal: Negative.    Musculoskeletal:  Positive for pain with walking.   Skin:  Positive for wound (Left great toe infected) and erythema.   Neurological: Negative.    Psychiatric/Behavioral: Negative.        Objective:     Physical Exam   Constitutional: He is oriented to person, place, and time. He appears well-developed.   HENT:   Head: Normocephalic and atraumatic. Head is without abrasion, without contusion and without laceration.   Ears:   Right Ear: External ear normal.   Left Ear: External ear normal.   Nose: Nose normal.   Mouth/Throat: Oropharynx is clear and moist and mucous membranes are normal.   Eyes: Conjunctivae, EOM and lids are normal. Pupils are equal, round, and reactive to light.   Neck: Trachea normal and phonation normal. Neck supple.   Cardiovascular: Normal rate.   Pulmonary/Chest: Effort normal. No respiratory distress.   Musculoskeletal: Normal range of motion.         General: Normal range of motion.      Left foot: Left great toe: Exhibits swelling and tenderness. Injuries: abrasion.   Neurological: He is alert and oriented to person, place, and time. He displays no weakness.   Skin: Skin is warm, dry, intact and no " rash. Capillary refill takes less than 2 seconds. erythema No abrasion, No burn, No bruising and No ecchymosis   Psychiatric: His speech is normal and behavior is normal. Mood, judgment and thought content normal.   Nursing note and vitals reviewed.      Assessment:     1. Infection of nail bed of toe of left foot        Plan:       Infection of nail bed of toe of left foot  -     cephALEXin (KEFLEX) 500 MG capsule; Take 1 capsule (500 mg total) by mouth every 6 (six) hours. for 5 days  Dispense: 20 capsule; Refill: 0  -     mupirocin (BACTROBAN) 2 % ointment; Apply topically 3 (three) times daily.  Dispense: 30 g; Refill: 0      Discussed medication with mom who acknowledges understanding and is agreeable to POC. Follow up with primary care. Increase fluid intake. Red flags for ER discussed.

## 2024-07-04 NOTE — PROGRESS NOTES
GASTROENTEROLOGY CLINIC NOTE    Chief Complaint: The primary encounter diagnosis was Enteritis. Diagnoses of Other hemorrhoids and Diarrhea, unspecified type were also pertinent to this visit.  Referring provider/PCP: Jonatan Haynes FNP    Gonsalo Mei III is a 28 y.o. male who is a new patient to me with a PMH that's significant for epilepsy, hemorrhoids, and developmental delay and is accompanied by his mother.  Patient history is provided by his mother. He is here today to establish care for diarrhea.    Diarrhea    How Lon-6 months   Normal Frequency of Bowel Movements: Daily or twice a day   Maximum Number of Bowel Movements: 3 to 4 per day mostly in morning  Consistency has become more loose   Asymptomatic Days/Days without Bowel Movements: no   Consistency: Very soft to watery    Mucus/Oily/Fatty/Foul Smelling: No mucus; some foul smelling   Urgency/Post Prandial/Undigesteed Food: urgency   Nausea/Vomiting/Fever/Weight Loss: no   Nocturnal Bowel Movements: Some nocturnal bowel movements   Will wake up between 0300 and 0700   Melena/Hematochezia: no   Abdominal Pain/Cramping no   Daily Fiber Supplement: no   Triggers: unsure     Treatments: none    Artifical Sweeteners:no  NSAIDs: no  ETOH: no  Caffeine: no  New medications: no  Antibiotics:  Travel: no  Cholecystectomy: No  _____________________________________________________________________________________  Interval Note 2024    Gonsalo Mei who is known to me presents via telemedicine encounter for follow-up regarding H pylori and diarrhea.  He is accompanied by his mother who helps to provide patient history.  Following last office visit abdominal x-ray obtained which revealed mild stool burden on bright side of large intestine.  It was recommended he start daily fiber supplement.  Stool studies were also obtained.  Stool noted to be positive for H pylori and calprotectin elevated to 224.9.  Pylera and Protonix were sent to  pharmacy.  Patient's mother states he was not able to complete Pylera as the pharmacy has not been able to get the medication.  She has started the fiber supplement in the evening and has noted some improvement in consistency of the stool.  He is still having some nocturnal bowel movements.  _______________________________________________________________________________________  Interval Note 3/12/2024    Albert Mei who is known to me presents via telemedicine encounter for follow up regarding h.pylori. He is accompanied by his mother. Completed H.pylori treatment. Bowel movements have improved.   Having daily soft bowel movements. Continues with Metamucil daily. Has no complaints at this time.    ________________________________________________________________________________  Interval Note 7/5/2024    The patient location is: [place]  The chief complaint leading to consultation is: ER Follow up     Visit type: audiovisual    Face to Face time with patient: 6:36  20 minutes of total time spent on the encounter, which includes face to face time and non-face to face time preparing to see the patient (eg, review of tests), Obtaining and/or reviewing separately obtained history, Documenting clinical information in the electronic or other health record, Independently interpreting results (not separately reported) and communicating results to the patient/family/caregiver, or Care coordination (not separately reported).     Each patient to whom he or she provides medical services by telemedicine is:  (1) informed of the relationship between the physician and patient and the respective role of any other health care provider with respect to management of the patient; and (2) notified that he or she may decline to receive medical services by telemedicine and may withdraw from such care at any time.    Notes:    Patient accompanied by his mother for video visit. Presents for ER follow up due to gastroenteritis.   Abdominal pain accompanied by nausea, vomiting, and diarrhea. CT concerning for enteritis. Completed course of antibiotics.   Symptoms have improved. Doing well. Requesting refill for Hydrocortisone cream for hemorrhoids which is helping.   Has no complaints at this time.       GLP-1s: No  NSAIDs: No  Anticoagulation or Antiplatelet: No    History of H.pylori: yes; stool antigen positive 2023; eradication confirmed with stool antigen 2024  H.pylori Treatment: Quadruple therapy with Protonix, flagyl, tetracycline, and bismuth  Prior Upper Endoscopy: no  Prior Colonoscopy: no  Family h/o Colon Cancer: maternal grandmother  Family h/o Crohn's Disease or Ulcerative Colitis: Mother with Crohn's   Family h/o Celiac Sprue: No  Abdominal Surgeries: no    Review of Systems   Constitutional:  Negative for weight loss.   HENT:  Negative for sore throat.    Eyes:  Negative for blurred vision.   Respiratory:  Negative for cough.    Cardiovascular:  Negative for chest pain.   Gastrointestinal:  Negative for abdominal pain, blood in stool, constipation, diarrhea, heartburn, melena, nausea and vomiting.   Genitourinary:  Negative for dysuria.   Musculoskeletal:  Negative for myalgias.   Skin:  Negative for rash.   Neurological:  Negative for headaches.   Endo/Heme/Allergies:  Negative for environmental allergies.   Psychiatric/Behavioral:  Negative for suicidal ideas. The patient is not nervous/anxious.        Past Medical History: has a past medical history of Cerebellar atrophy and Epilepsy.    Past Surgical History: has a past surgical history that includes Tonsillectomy and vagal nerve stimulator (2004) (Left, 2004).    Family History:family history includes Asthma in his mother; Cancer in his maternal grandmother; Diabetes in his maternal grandmother and paternal aunt; Early death in his maternal grandmother; Heart disease in his maternal grandfather, maternal grandmother, and paternal aunt; Hypertension in his maternal  grandfather, maternal grandmother, and paternal aunt; Stroke in his maternal grandmother.    Allergies:   Review of patient's allergies indicates:   Allergen Reactions    Levetiracetam Hallucinations and Other (See Comments)    Tamiflu [oseltamivir] Other (See Comments)     seizures       Social History: reports that he has never smoked. He does not have any smokeless tobacco history on file. He reports that he does not drink alcohol and does not use drugs.    Home medications:   Current Outpatient Medications on File Prior to Visit   Medication Sig Dispense Refill    cetirizine (ZYRTEC) 10 MG tablet Take 1 tablet by mouth once daily.      clorazepate (TRANXENE) 3.75 MG Tab Take 7.5 mg by mouth 2 (two) times daily.       clotrimazole (LOTRIMIN) 1 % cream APPLY TO THE AFFECTED AND SURROUNDING AREAS OF SKIN TOPICALY TWO TIMES PER DAY IN THE MORNING AND EVENING      eslicarbazepine (APTIOM) 200 mg Tab Take 2 tablets (400 mg total) by mouth once daily. 60 tablet 5    eslicarbazepine (APTIOM) 800 mg Tab Take 800 mg by mouth every evening.       fluticasone propionate (FLONASE) 50 mcg/actuation nasal spray as needed for Rhinitis or Allergies      gentian violet 2 % topical solution Apply 1 application by topical route.      mupirocin (BACTROBAN) 2 % ointment Apply topically 3 (three) times daily. 30 g 0    ondansetron (ZOFRAN-ODT) 4 MG TbDL Take 1 tablet (4 mg total) by mouth every 8 (eight) hours as needed (Nausea and vomiting). 15 tablet 0    risperidone (RISPERDAL M-TABS) 1 MG TbDL Take 1 mg by mouth once daily.       topiramate (QUDEXY XR) 100 mg CSpX Take 300 mg by mouth every evening.      [DISCONTINUED] cephALEXin (KEFLEX) 500 MG capsule Take 1 capsule (500 mg total) by mouth every 6 (six) hours. for 5 days 20 capsule 0    [DISCONTINUED] dicyclomine (BENTYL) 20 mg tablet Take 1 tablet (20 mg total) by mouth 2 (two) times daily. 20 tablet 0    [DISCONTINUED] PROCTO-MED HC 2.5 % rectal cream APPLY  CREAM RECTALLY  "TWICE DAILY AS NEEDED FOR  HEMORRHOIDS. 28 g 0     No current facility-administered medications on file prior to visit.       Vital signs:  There were no vitals taken for this visit.    Physical Exam  Constitutional:       Appearance: Normal appearance. He is not ill-appearing.      Comments: Limited Physical Exam d/t Telemedicine Encounter   HENT:      Head: Normocephalic.   Pulmonary:      Effort: Pulmonary effort is normal. No respiratory distress.   Neurological:      Mental Status: He is alert and oriented to person, place, and time.   Psychiatric:         Mood and Affect: Mood normal.         Behavior: Behavior normal.         Thought Content: Thought content normal.         Judgment: Judgment normal.         Routine labs:  Lab Results   Component Value Date    WBC 17.19 (H) 05/17/2024    HGB 17.6 05/17/2024    HCT 52.4 05/17/2024    MCV 93 05/17/2024     05/17/2024     No results found for: "INR"  No results found for: "IRON", "FERRITIN", "TIBC", "FESATURATED"  Lab Results   Component Value Date     05/17/2024    K 4.0 05/17/2024     05/17/2024    CO2 20 (L) 05/17/2024    BUN 14 05/17/2024    CREATININE 1.5 (H) 05/17/2024     Lab Results   Component Value Date    ALBUMIN 4.5 05/17/2024    ALT 35 05/17/2024    AST 20 05/17/2024    ALKPHOS 173 (H) 05/17/2024    BILITOT 0.6 05/17/2024     Lab Results   Component Value Date    GLUCOSE 85 06/24/2021     Lab Results   Component Value Date    TSH 0.550 12/23/2019     Lab Results   Component Value Date    CALCIUM 9.8 05/17/2024    PHOS 3.6 12/27/2019       Imaging:      I have reviewed prior labs, imaging, and notes.      Assessment:  1. Enteritis    2. Other hemorrhoids    3. Diarrhea, unspecified type      Completed abx therapy for enteritis. Symptoms improved.  Intermittent flares of hemorrhoids. Hydrocortisone cream helping.   Diarrhea improved. Taking Metamucil daily.     Plan:  Orders Placed This Encounter    hydrocortisone (PROCTO-MED HC) " 2.5 % rectal cream     Hydrocortisone cream for hemorrhoids.   If become more problematic consider referral to CRS.  Continue daily fiber supplement    Albert recently completed course of Keflex for toe infection. Mother will reach out to us if he starts having worsening diarrhea. Consider checking stool for c.diff due to completing two recent courses of abx.     Plan of care discussed with patient who is in agreement and verbalized understanding.     I have explained the planned procedures to the patient.The risks, benefits and alternatives of the procedure were also explained in detail. Patient verbalized understanding, all questions were answered. The patient agrees to proceed as planned    Follow Up:  SONU Heredia, APRN,FNP-BC  [facility] [place]    (Portions of this note were dictated using voice recognition software and may contain dictation related errors in spelling/grammar/syntax not found on text review)

## 2024-07-05 ENCOUNTER — OFFICE VISIT (OUTPATIENT)
Dept: GASTROENTEROLOGY | Facility: CLINIC | Age: 29
End: 2024-07-05
Payer: MEDICAID

## 2024-07-05 DIAGNOSIS — R19.7 DIARRHEA, UNSPECIFIED TYPE: ICD-10-CM

## 2024-07-05 DIAGNOSIS — K52.9 ENTERITIS: Primary | ICD-10-CM

## 2024-07-05 DIAGNOSIS — K64.8 OTHER HEMORRHOIDS: ICD-10-CM

## 2024-07-05 RX ORDER — HYDROCORTISONE 25 MG/G
CREAM TOPICAL 2 TIMES DAILY
Qty: 28 G | Refills: 0 | Status: SHIPPED | OUTPATIENT
Start: 2024-07-05 | End: 2024-08-04

## 2024-08-14 ENCOUNTER — OFFICE VISIT (OUTPATIENT)
Dept: URGENT CARE | Facility: CLINIC | Age: 29
End: 2024-08-14
Payer: MEDICAID

## 2024-08-14 VITALS
BODY MASS INDEX: 29.23 KG/M2 | OXYGEN SATURATION: 99 % | TEMPERATURE: 99 F | HEIGHT: 63 IN | SYSTOLIC BLOOD PRESSURE: 138 MMHG | WEIGHT: 165 LBS | RESPIRATION RATE: 18 BRPM | HEART RATE: 74 BPM | DIASTOLIC BLOOD PRESSURE: 92 MMHG
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
## 2024-08-14 DIAGNOSIS — M25.572 ACUTE LEFT ANKLE PAIN: Primary | ICD-10-CM

## 2024-08-14 DIAGNOSIS — S80.219A ABRASION OF KNEE, UNSPECIFIED LATERALITY, INITIAL ENCOUNTER: ICD-10-CM

## 2024-08-14 PROCEDURE — 99214 OFFICE O/P EST MOD 30 MIN: CPT | Mod: S$GLB,,, | Performed by: NURSE PRACTITIONER

## 2024-08-14 PROCEDURE — 73610 X-RAY EXAM OF ANKLE: CPT | Mod: LT,S$GLB,, | Performed by: RADIOLOGY

## 2024-08-14 RX ORDER — NAPROXEN 500 MG/1
500 TABLET ORAL 2 TIMES DAILY PRN
Qty: 14 TABLET | Refills: 0 | Status: SHIPPED | OUTPATIENT
Start: 2024-08-14 | End: 2024-08-21

## 2024-08-14 RX ORDER — MUPIROCIN 20 MG/G
OINTMENT TOPICAL 3 TIMES DAILY
Qty: 22 G | Refills: 0 | Status: SHIPPED | OUTPATIENT
Start: 2024-08-14 | End: 2024-08-21

## 2024-08-15 NOTE — PATIENT INSTRUCTIONS
Rest, and elevate right ankle until better  Apply ice for 15 minutes every 2 hours. After 48 hours, may use moist heat or heating pad  Wear ace wrap for compression and to reduce swelling until better  Take naproxen as prescribed. Take each dose with food.  Do not take additional NSAIDs such as ibuprofen or Alleve while taking naproxen  May take tylenol between ibuprofen doses as needed.  Wash knee wounds with warm soapy water daily and change dressing.  Apply mupirocin to 3 times daily  Follow up with PCP   Return to clinic as needed for new or worse symptoms.

## 2024-08-15 NOTE — PROGRESS NOTES
"Subjective:      Patient ID: Gonsalo Mei III is a 28 y.o. male.    Vitals:  height is 5' 3" (1.6 m) and weight is 74.8 kg (165 lb). His temperature is 98.7 °F (37.1 °C). His blood pressure is 138/92 (abnormal) and his pulse is 74. His respiration is 18 and oxygen saturation is 99%.     Chief Complaint: Ankle Pain    28-year-old male seen today for left ankle pain and bilateral knee abrasions.  His caretaker states that he tripped and fell yesterday while walking, causing abrasions to bilateral knees.  She states he has continued to limp in favor his left ankle since the fall.    Ankle Pain   The incident occurred 12 to 24 hours ago. The incident occurred at school. The injury mechanism was a fall. The pain is present in the left foot and left ankle. The quality of the pain is described as aching. The pain is moderate. The symptoms are aggravated by weight bearing and movement. He has tried ice and NSAIDs for the symptoms. The treatment provided no relief.       Constitution: Negative for chills and fever.   HENT:  Negative for congestion and sore throat.    Cardiovascular:  Negative for chest pain, palpitations and sob on exertion.   Respiratory:  Negative for shortness of breath.    Gastrointestinal:  Negative for nausea and vomiting.   Musculoskeletal:  Positive for pain, trauma, joint pain and joint swelling. Negative for abnormal ROM of joint.   Skin:  Positive for abrasion. Negative for erythema.   Neurological:  Negative for dizziness, light-headedness, passing out, disorientation and altered mental status.   Psychiatric/Behavioral:  Negative for altered mental status, disorientation and confusion.       Objective:     Physical Exam   Constitutional: He is oriented to person, place, and time. He appears well-developed. He is cooperative.  Non-toxic appearance. He does not appear ill. No distress.   HENT:   Head: Normocephalic and atraumatic.   Ears:   Right Ear: External ear normal.   Left Ear: External " ear normal.   Nose: Nose normal.   Mouth/Throat: Oropharynx is clear and moist and mucous membranes are normal. Mucous membranes are moist.   Eyes: Conjunctivae and lids are normal. No scleral icterus.   Neck: Trachea normal and phonation normal. Neck supple.   Cardiovascular: Normal rate, regular rhythm, normal heart sounds and normal pulses.   Pulmonary/Chest: Effort normal and breath sounds normal. No stridor. No respiratory distress.   Abdominal: Normal appearance.   Musculoskeletal:         General: No deformity.      Left ankle: He exhibits decreased range of motion and swelling. He exhibits no deformity, no laceration and normal pulse. Tenderness. Lateral malleolus tenderness found. Achilles tendon normal.        Legs:    Neurological: no focal deficit. He is alert and oriented to person, place, and time. He has normal strength and normal reflexes. No sensory deficit.   Skin: Skin is warm, dry, intact and not diaphoretic. Capillary refill takes 2 to 3 seconds. not left ankleNo erythema   Psychiatric: His speech is normal and behavior is normal. Judgment and thought content normal.   Nursing note and vitals reviewed.      Assessment:     1. Acute left ankle pain    2. Fall, initial encounter    3. Abrasion of knee, unspecified laterality, initial encounter        Plan:       Acute left ankle pain  -     XR ANKLE COMPLETE 3 VIEW LEFT; Future; Expected date: 08/14/2024  -     naproxen (NAPROSYN) 500 MG tablet; Take 1 tablet (500 mg total) by mouth 2 (two) times daily as needed (pain).  Dispense: 14 tablet; Refill: 0  -     HME - OTHER    Fall, initial encounter  -     naproxen (NAPROSYN) 500 MG tablet; Take 1 tablet (500 mg total) by mouth 2 (two) times daily as needed (pain).  Dispense: 14 tablet; Refill: 0  -     mupirocin (BACTROBAN) 2 % ointment; Apply topically 3 (three) times daily. for 7 days  Dispense: 22 g; Refill: 0    Abrasion of knee, unspecified laterality, initial encounter  -     mupirocin  (BACTROBAN) 2 % ointment; Apply topically 3 (three) times daily. for 7 days  Dispense: 22 g; Refill: 0    I have independently reviewed the ankle films and have discussed these and the physical exam findings with the patient's guardian and all questions answered.  I do not see any obvious fractures or dislocations at this time but we discussed once the final over read was received someone will contact them with the official results.  We discussed symptom monitoring, conservative care methods, medication use, and follow up orders.  She verbalized understanding and agreement with the plan of care.  An elastic wrap was placed to the left ankle prior to discharge.  Color, sensation, and neuro those were within normal limits distal to wrap after placement.

## 2024-12-15 ENCOUNTER — OFFICE VISIT (OUTPATIENT)
Dept: URGENT CARE | Facility: CLINIC | Age: 29
End: 2024-12-15
Payer: MEDICAID

## 2024-12-15 VITALS
DIASTOLIC BLOOD PRESSURE: 63 MMHG | HEIGHT: 63 IN | RESPIRATION RATE: 20 BRPM | HEART RATE: 67 BPM | WEIGHT: 175 LBS | TEMPERATURE: 99 F | SYSTOLIC BLOOD PRESSURE: 110 MMHG | BODY MASS INDEX: 31.01 KG/M2 | OXYGEN SATURATION: 97 %

## 2024-12-15 DIAGNOSIS — R11.10 VOMITING, UNSPECIFIED VOMITING TYPE, UNSPECIFIED WHETHER NAUSEA PRESENT: ICD-10-CM

## 2024-12-15 DIAGNOSIS — A08.4 VIRAL GASTROENTERITIS: Primary | ICD-10-CM

## 2024-12-15 DIAGNOSIS — R19.7 DIARRHEA, UNSPECIFIED TYPE: ICD-10-CM

## 2024-12-15 PROCEDURE — 99213 OFFICE O/P EST LOW 20 MIN: CPT | Mod: S$GLB,,, | Performed by: NURSE PRACTITIONER

## 2024-12-15 RX ORDER — ONDANSETRON 4 MG/1
4 TABLET, ORALLY DISINTEGRATING ORAL EVERY 8 HOURS PRN
Qty: 20 TABLET | Refills: 0 | Status: SHIPPED | OUTPATIENT
Start: 2024-12-15

## 2024-12-15 NOTE — PROGRESS NOTES
"Subjective:      Patient ID: Gonsalo Mei III is a 29 y.o. male.    Vitals:  height is 5' 3" (1.6 m) and weight is 79.4 kg (175 lb). His oral temperature is 99 °F (37.2 °C). His blood pressure is 110/63 and his pulse is 67. His respiration is 20 and oxygen saturation is 97%.     Chief Complaint: Diarrhea    Onset of vomiting diarrhea Friday, 2 days ago.  Vomiting has improved however he is continuing to have numerous watery stools however upon further interview found that mother has been giving him Gatorade as Pedialyte replacement    Diarrhea   This is a new problem. The current episode started in the past 7 days. The problem occurs 5 to 10 times per day. The problem has been unchanged. The stool consistency is described as Watery. The patient states that diarrhea awakens him from sleep. Associated symptoms include abdominal pain (Cramping associated with diarrhea stool) and vomiting (Last vomiting episode last night after drinking Gatorade). Pertinent negatives include no chills, coughing, fever or myalgias. Nothing aggravates the symptoms. There are no known risk factors. He has tried electrolyte solution and increased fluids for the symptoms. The treatment provided no relief.       Constitution: Positive for appetite change. Negative for chills, fatigue and fever.   HENT:  Negative for congestion and sore throat.    Respiratory:  Negative for chest tightness, cough and shortness of breath.    Gastrointestinal:  Positive for abdominal pain (Cramping associated with diarrhea stool), vomiting (Last vomiting episode last night after drinking Gatorade) and diarrhea (Numerous, watery).   Musculoskeletal:  Negative for muscle ache.   Skin:  Negative for rash.      Objective:     Physical Exam   Constitutional: He is oriented to person, place, and time.  Non-toxic appearance. No distress. obesity  HENT:   Head: Normocephalic and atraumatic.   Nose: Nose normal.   Mouth/Throat: Mucous membranes are moist.   Eyes: " Conjunctivae are normal.   Cardiovascular: Normal rate.   Pulmonary/Chest: Effort normal. No respiratory distress.   Abdominal: Normal appearance. Soft. flat abdomen There is no abdominal tenderness. There is no rebound and no guarding.   Neurological: no focal deficit. He is alert and oriented to person, place, and time.   Skin: Skin is warm and dry. Capillary refill takes 2 to 3 seconds.   Psychiatric: His behavior is normal. Mood normal.   Nursing note and vitals reviewed.      Assessment:     1. Viral gastroenteritis    2. Vomiting, unspecified vomiting type, unspecified whether nausea present    3. Diarrhea, unspecified type        Plan:       Viral gastroenteritis    Vomiting, unspecified vomiting type, unspecified whether nausea present  -     ondansetron (ZOFRAN-ODT) 4 MG TbDL; Take 1 tablet (4 mg total) by mouth every 8 (eight) hours as needed (nausea).  Dispense: 20 tablet; Refill: 0    Diarrhea, unspecified type

## 2024-12-15 NOTE — PATIENT INSTRUCTIONS
Clear fluids for 24 hours.    Offer small frequent amounts of fluids to include 50% electrolyte as Pedialyte, avoid Gatorade Powerade sports drinks.      As we discussed watch for signs and symptoms of worsening abdominal pain, constant abdominal pain, fever, persistent vomiting not controlled with the Zofran prescribed.  Signs and symptoms dehydration, change in mental status or excessive lethargy.  If symptoms emerge please bring him to the emergency room for evaluation possible need for IV fluids    Zofran as prescribed as needed for nausea/vomiting

## 2025-01-25 ENCOUNTER — HOSPITAL ENCOUNTER (EMERGENCY)
Facility: HOSPITAL | Age: 30
Discharge: ANOTHER HOSPITAL | End: 2025-01-25
Attending: EMERGENCY MEDICINE
Payer: MEDICAID

## 2025-01-25 VITALS
HEIGHT: 63 IN | WEIGHT: 175.06 LBS | BODY MASS INDEX: 31.02 KG/M2 | SYSTOLIC BLOOD PRESSURE: 139 MMHG | RESPIRATION RATE: 23 BRPM | HEART RATE: 108 BPM | TEMPERATURE: 100 F | DIASTOLIC BLOOD PRESSURE: 75 MMHG | OXYGEN SATURATION: 100 %

## 2025-01-25 DIAGNOSIS — G40.901 STATUS EPILEPTICUS: Primary | ICD-10-CM

## 2025-01-25 DIAGNOSIS — Z13.6 SCREENING FOR CARDIOVASCULAR CONDITION: ICD-10-CM

## 2025-01-25 PROBLEM — Z99.11 ON MECHANICALLY ASSISTED VENTILATION: Status: ACTIVE | Noted: 2025-01-25

## 2025-01-25 PROBLEM — J10.1 INFLUENZA A: Status: ACTIVE | Noted: 2025-01-25

## 2025-01-25 LAB
ALBUMIN SERPL BCP-MCNC: 3.9 G/DL (ref 3.5–5.2)
ALLENS TEST: ABNORMAL
ALP SERPL-CCNC: 132 U/L (ref 40–150)
ALT SERPL W/O P-5'-P-CCNC: 34 U/L (ref 10–44)
AMPHET+METHAMPHET UR QL: NEGATIVE
ANION GAP SERPL CALC-SCNC: 10 MMOL/L (ref 8–16)
AST SERPL-CCNC: 31 U/L (ref 10–40)
BACTERIA #/AREA URNS HPF: NORMAL /HPF
BARBITURATES UR QL SCN>200 NG/ML: NEGATIVE
BASOPHILS # BLD AUTO: 0.08 K/UL (ref 0–0.2)
BASOPHILS NFR BLD: 0.5 % (ref 0–1.9)
BENZODIAZ UR QL SCN>200 NG/ML: ABNORMAL
BILIRUB SERPL-MCNC: 0.5 MG/DL (ref 0.1–1)
BILIRUB UR QL STRIP: NEGATIVE
BUN SERPL-MCNC: 8 MG/DL (ref 6–20)
BZE UR QL SCN: NEGATIVE
CALCIUM SERPL-MCNC: 9.2 MG/DL (ref 8.7–10.5)
CANNABINOIDS UR QL SCN: NEGATIVE
CHLORIDE SERPL-SCNC: 105 MMOL/L (ref 95–110)
CLARITY UR: CLEAR
CO2 SERPL-SCNC: 22 MMOL/L (ref 23–29)
COLOR UR: YELLOW
CREAT SERPL-MCNC: 1.7 MG/DL (ref 0.5–1.4)
CREAT UR-MCNC: 140.8 MG/DL (ref 23–375)
DELSYS: ABNORMAL
DIFFERENTIAL METHOD BLD: ABNORMAL
EOSINOPHIL # BLD AUTO: 0.2 K/UL (ref 0–0.5)
EOSINOPHIL NFR BLD: 1.1 % (ref 0–8)
ERYTHROCYTE [DISTWIDTH] IN BLOOD BY AUTOMATED COUNT: 12.3 % (ref 11.5–14.5)
ERYTHROCYTE [SEDIMENTATION RATE] IN BLOOD BY WESTERGREN METHOD: 16 MM/H
EST. GFR  (NO RACE VARIABLE): 56 ML/MIN/1.73 M^2
FIO2: 100
GLUCOSE SERPL-MCNC: 150 MG/DL (ref 70–110)
GLUCOSE UR QL STRIP: NEGATIVE
HCO3 UR-SCNC: 20.3 MMOL/L (ref 24–28)
HCT VFR BLD AUTO: 45.4 % (ref 40–54)
HGB BLD-MCNC: 14.9 G/DL (ref 14–18)
HGB UR QL STRIP: NEGATIVE
HYALINE CASTS #/AREA URNS LPF: 0 /LPF
IMM GRANULOCYTES # BLD AUTO: 0.08 K/UL (ref 0–0.04)
IMM GRANULOCYTES NFR BLD AUTO: 0.5 % (ref 0–0.5)
INFLUENZA A, MOLECULAR: POSITIVE
INFLUENZA B, MOLECULAR: NEGATIVE
KETONES UR QL STRIP: NEGATIVE
LDH SERPL L TO P-CCNC: 1.69 MMOL/L (ref 0.36–1.25)
LEUKOCYTE ESTERASE UR QL STRIP: NEGATIVE
LYMPHOCYTES # BLD AUTO: 1.9 K/UL (ref 1–4.8)
LYMPHOCYTES NFR BLD: 11.7 % (ref 18–48)
MAGNESIUM SERPL-MCNC: 1.6 MG/DL (ref 1.6–2.6)
MCH RBC QN AUTO: 31.1 PG (ref 27–31)
MCHC RBC AUTO-ENTMCNC: 32.8 G/DL (ref 32–36)
MCV RBC AUTO: 95 FL (ref 82–98)
METHADONE UR QL SCN>300 NG/ML: NEGATIVE
MICROSCOPIC COMMENT: NORMAL
MIN VOL: 12
MODE: ABNORMAL
MONOCYTES # BLD AUTO: 1.6 K/UL (ref 0.3–1)
MONOCYTES NFR BLD: 9.9 % (ref 4–15)
NEUTROPHILS # BLD AUTO: 12.5 K/UL (ref 1.8–7.7)
NEUTROPHILS NFR BLD: 76.3 % (ref 38–73)
NITRITE UR QL STRIP: NEGATIVE
NRBC BLD-RTO: 0 /100 WBC
OPIATES UR QL SCN: NEGATIVE
PCO2 BLDA: 35 MMHG (ref 35–45)
PCP UR QL SCN>25 NG/ML: NEGATIVE
PEEP: 5
PH SMN: 7.37 [PH] (ref 7.35–7.45)
PH UR STRIP: 6 [PH] (ref 5–8)
PIP: 18
PLATELET # BLD AUTO: 216 K/UL (ref 150–450)
PMV BLD AUTO: 9.2 FL (ref 9.2–12.9)
PO2 BLDA: 551 MMHG (ref 80–100)
POC BE: -5 MMOL/L
POC SATURATED O2: 100 % (ref 95–100)
POC TCO2: 21 MMOL/L (ref 23–27)
POTASSIUM SERPL-SCNC: 3.6 MMOL/L (ref 3.5–5.1)
PROT SERPL-MCNC: 7.7 G/DL (ref 6–8.4)
PROT UR QL STRIP: ABNORMAL
RBC # BLD AUTO: 4.79 M/UL (ref 4.6–6.2)
RBC #/AREA URNS HPF: 3 /HPF (ref 0–4)
SAMPLE: ABNORMAL
SARS-COV-2 RDRP RESP QL NAA+PROBE: NEGATIVE
SITE: ABNORMAL
SODIUM SERPL-SCNC: 137 MMOL/L (ref 136–145)
SP GR UR STRIP: 1.01 (ref 1–1.03)
SP02: 98
SPECIMEN SOURCE: ABNORMAL
SQUAMOUS #/AREA URNS HPF: 0 /HPF
TOXICOLOGY INFORMATION: ABNORMAL
URN SPEC COLLECT METH UR: ABNORMAL
UROBILINOGEN UR STRIP-ACNC: NEGATIVE EU/DL
VT: 400
WBC # BLD AUTO: 16.37 K/UL (ref 3.9–12.7)
WBC #/AREA URNS HPF: 3 /HPF (ref 0–5)

## 2025-01-25 PROCEDURE — 83605 ASSAY OF LACTIC ACID: CPT

## 2025-01-25 PROCEDURE — 80053 COMPREHEN METABOLIC PANEL: CPT | Performed by: EMERGENCY MEDICINE

## 2025-01-25 PROCEDURE — 93010 ELECTROCARDIOGRAM REPORT: CPT | Mod: ,,, | Performed by: INTERNAL MEDICINE

## 2025-01-25 PROCEDURE — 31500 INSERT EMERGENCY AIRWAY: CPT

## 2025-01-25 PROCEDURE — 87635 SARS-COV-2 COVID-19 AMP PRB: CPT | Performed by: EMERGENCY MEDICINE

## 2025-01-25 PROCEDURE — 63600175 PHARM REV CODE 636 W HCPCS: Performed by: EMERGENCY MEDICINE

## 2025-01-25 PROCEDURE — 99291 CRITICAL CARE FIRST HOUR: CPT

## 2025-01-25 PROCEDURE — 80307 DRUG TEST PRSMV CHEM ANLYZR: CPT | Performed by: EMERGENCY MEDICINE

## 2025-01-25 PROCEDURE — 85025 COMPLETE CBC W/AUTO DIFF WBC: CPT | Performed by: EMERGENCY MEDICINE

## 2025-01-25 PROCEDURE — 83735 ASSAY OF MAGNESIUM: CPT | Performed by: EMERGENCY MEDICINE

## 2025-01-25 PROCEDURE — 36415 COLL VENOUS BLD VENIPUNCTURE: CPT | Performed by: EMERGENCY MEDICINE

## 2025-01-25 PROCEDURE — 25000003 PHARM REV CODE 250: Performed by: EMERGENCY MEDICINE

## 2025-01-25 PROCEDURE — 93005 ELECTROCARDIOGRAM TRACING: CPT

## 2025-01-25 PROCEDURE — 94002 VENT MGMT INPAT INIT DAY: CPT

## 2025-01-25 PROCEDURE — 87040 BLOOD CULTURE FOR BACTERIA: CPT | Mod: 59 | Performed by: EMERGENCY MEDICINE

## 2025-01-25 PROCEDURE — 27100171 HC OXYGEN HIGH FLOW UP TO 24 HOURS

## 2025-01-25 PROCEDURE — 99900035 HC TECH TIME PER 15 MIN (STAT)

## 2025-01-25 PROCEDURE — 82803 BLOOD GASES ANY COMBINATION: CPT

## 2025-01-25 PROCEDURE — 94760 N-INVAS EAR/PLS OXIMETRY 1: CPT | Mod: XB

## 2025-01-25 PROCEDURE — 80201 ASSAY OF TOPIRAMATE: CPT | Performed by: EMERGENCY MEDICINE

## 2025-01-25 PROCEDURE — 96374 THER/PROPH/DIAG INJ IV PUSH: CPT

## 2025-01-25 PROCEDURE — 81000 URINALYSIS NONAUTO W/SCOPE: CPT | Mod: 59 | Performed by: EMERGENCY MEDICINE

## 2025-01-25 PROCEDURE — 87502 INFLUENZA DNA AMP PROBE: CPT | Performed by: EMERGENCY MEDICINE

## 2025-01-25 PROCEDURE — 36600 WITHDRAWAL OF ARTERIAL BLOOD: CPT

## 2025-01-25 RX ORDER — ACETAMINOPHEN 160 MG/5ML
650 SOLUTION ORAL
Status: COMPLETED | OUTPATIENT
Start: 2025-01-25 | End: 2025-01-25

## 2025-01-25 RX ORDER — LORAZEPAM 2 MG/ML
2 INJECTION INTRAMUSCULAR
Status: COMPLETED | OUTPATIENT
Start: 2025-01-25 | End: 2025-01-25

## 2025-01-25 RX ORDER — SULFAMETHOXAZOLE AND TRIMETHOPRIM 800; 160 MG/1; MG/1
1 TABLET ORAL EVERY 12 HOURS
COMMUNITY
Start: 2025-01-15 | End: 2025-01-25

## 2025-01-25 RX ORDER — LORAZEPAM 2 MG/ML
INJECTION INTRAMUSCULAR
Status: DISCONTINUED
Start: 2025-01-25 | End: 2025-01-25 | Stop reason: HOSPADM

## 2025-01-25 RX ORDER — MIDAZOLAM HYDROCHLORIDE 2 MG/ML
5 SYRUP ORAL CONTINUOUS PRN
Status: ON HOLD | COMMUNITY
End: 2025-01-29 | Stop reason: HOSPADM

## 2025-01-25 RX ORDER — ETOMIDATE 2 MG/ML
30 INJECTION INTRAVENOUS
Status: COMPLETED | OUTPATIENT
Start: 2025-01-25 | End: 2025-01-25

## 2025-01-25 RX ORDER — SODIUM CHLORIDE 9 MG/ML
1000 INJECTION, SOLUTION INTRAVENOUS
Status: COMPLETED | OUTPATIENT
Start: 2025-01-25 | End: 2025-01-25

## 2025-01-25 RX ORDER — CLORAZEPATE DIPOTASSIUM 15 MG/1
0.5 TABLET ORAL 2 TIMES DAILY
COMMUNITY
Start: 2024-12-28

## 2025-01-25 RX ORDER — PROPOFOL 10 MG/ML
0-50 INJECTION, EMULSION INTRAVENOUS CONTINUOUS
Status: DISCONTINUED | OUTPATIENT
Start: 2025-01-25 | End: 2025-01-25 | Stop reason: HOSPADM

## 2025-01-25 RX ORDER — TOPIRAMATE 100 MG/1
100 CAPSULE, EXTENDED RELEASE ORAL DAILY
COMMUNITY

## 2025-01-25 RX ADMIN — SODIUM CHLORIDE 1000 ML: 9 INJECTION, SOLUTION INTRAVENOUS at 01:01

## 2025-01-25 RX ADMIN — ETOMIDATE 30 MG: 2 INJECTION INTRAVENOUS at 01:01

## 2025-01-25 RX ADMIN — ACETAMINOPHEN 649.6 MG: 160 SUSPENSION ORAL at 02:01

## 2025-01-25 RX ADMIN — PROPOFOL 5 MCG/KG/MIN: 10 INJECTION, EMULSION INTRAVENOUS at 01:01

## 2025-01-25 RX ADMIN — LORAZEPAM 2 MG: 2 INJECTION INTRAMUSCULAR at 01:01

## 2025-01-25 NOTE — ED PROVIDER NOTES
Encounter Date: 1/25/2025       History     Chief Complaint   Patient presents with    Seizures     Hx of seizures, seizing since 0000 and given 10mg Versed IM by family at 0010     Patient presents emergency department via EMS with reported status epilepticus head began having seizures at midnight received 10 mg of Versed intranasal without improvement EMS did not administer any medications EN route he was found now oxygen saturation in the mid 80s at their arrival he is placed on 100% non-rebreather there was no reported antecedent trauma no reported change in medications review of systems is limited secondary to patient's altered mental status        Review of patient's allergies indicates:   Allergen Reactions    Levetiracetam Hallucinations and Other (See Comments)    Tamiflu [oseltamivir] Other (See Comments)     seizures     Past Medical History:   Diagnosis Date    Cerebellar atrophy     by MRI    Epilepsy      Past Surgical History:   Procedure Laterality Date    TONSILLECTOMY      vagal nerve stimulator (2004) Left 2004     Family History   Problem Relation Name Age of Onset    Asthma Mother      Diabetes Paternal Aunt      Heart disease Paternal Aunt      Hypertension Paternal Aunt      Diabetes Maternal Grandmother      Early death Maternal Grandmother      Heart disease Maternal Grandmother      Hypertension Maternal Grandmother      Stroke Maternal Grandmother      Cancer Maternal Grandmother      Heart disease Maternal Grandfather      Hypertension Maternal Grandfather       Social History     Tobacco Use    Smoking status: Never   Substance Use Topics    Alcohol use: Never    Drug use: Never     Review of Systems    Physical Exam     Initial Vitals   BP Pulse Resp Temp SpO2   01/25/25 0113 01/25/25 0113 01/25/25 0113 01/25/25 0150 01/25/25 0113   (!) 172/79 (!) 114 (!) 24 (!) 102.2 °F (39 °C) 97 %      MAP       --                Physical Exam    ED Course   Intubation    Date/Time: 1/25/2025 1:08  AM  Location procedure was performed: [facility] EMERGENCY DEPARTMENT    Performed by: Colt Almazan MD  Authorized by: Colt Almazan MD  Consent Done: Emergent Situation  Indications: hypoxemia and airway protection  Intubation method: direct  Patient status: sedated  Preoxygenation: nonrebreather mask  Sedatives: etomidate  Paralytic: none  Laryngoscope size: Mac 4  Tube size: 7.5 mm  Tube type: cuffed  Number of attempts: 1  Cricoid pressure: yes  Cords visualized: yes  Post-procedure assessment: chest rise and ETCO2 monitor  Breath sounds: clear and equal and absent over the epigastrium  Cuff inflated: yes  ETT to lip: 22 cm  Tube secured with: ETT slade  Chest x-ray interpreted by me.  Chest x-ray findings: endotracheal tube in appropriate position  Patient tolerance: Patient tolerated the procedure well with no immediate complications  Complications: No  Comments: Orogastric tube placed after intubation        Labs Reviewed   INFLUENZA A & B BY MOLECULAR - Abnormal       Result Value    Influenza A, Molecular Positive (*)     Influenza B, Molecular Negative      Flu A & B Source Nasal swab      Narrative:     flu a  critical result(s) called and verbal readback obtained from   adeola lam by [email or web address] 01/25/2025 02:21   CBC W/ AUTO DIFFERENTIAL - Abnormal    WBC 16.37 (*)     RBC 4.79      Hemoglobin 14.9      Hematocrit 45.4      MCV 95      MCH 31.1 (*)     MCHC 32.8      RDW 12.3      Platelets 216      MPV 9.2      Immature Granulocytes 0.5      Gran # (ANC) 12.5 (*)     Immature Grans (Abs) 0.08 (*)     Lymph # 1.9      Mono # 1.6 (*)     Eos # 0.2      Baso # 0.08      nRBC 0      Gran % 76.3 (*)     Lymph % 11.7 (*)     Mono % 9.9      Eosinophil % 1.1      Basophil % 0.5      Differential Method Automated     COMPREHENSIVE METABOLIC PANEL - Abnormal    Sodium 137      Potassium 3.6      Chloride 105      CO2 22 (*)     Glucose 150 (*)     BUN 8      Creatinine 1.7 (*)     Calcium 9.2      Total  Protein 7.7      Albumin 3.9      Total Bilirubin 0.5      Alkaline Phosphatase 132      AST 31      ALT 34      eGFR 56 (*)     Anion Gap 10     URINALYSIS, REFLEX TO URINE CULTURE - Abnormal    Specimen UA Urine, Clean Catch      Color, UA Yellow      Appearance, UA Clear      pH, UA 6.0      Specific Gravity, UA 1.015      Protein, UA 1+ (*)     Glucose, UA Negative      Ketones, UA Negative      Bilirubin (UA) Negative      Occult Blood UA Negative      Nitrite, UA Negative      Urobilinogen, UA Negative      Leukocytes, UA Negative      Narrative:     Specimen Source->Urine   DRUG SCREEN PANEL, URINE EMERGENCY - Abnormal    Benzodiazepines Presumptive Positive (*)     Methadone metabolites Negative      Cocaine (Metab.) Negative      Opiate Scrn, Ur Negative      Barbiturate Screen, Ur Negative      Amphetamine Screen, Ur Negative      THC Negative      Phencyclidine Negative      Creatinine, Urine 140.8      Toxicology Information SEE COMMENT      Narrative:     Specimen Source->Urine   ISTAT PROCEDURE - Abnormal    POC PH 7.372      POC PCO2 35.0      POC PO2 551 (*)     POC HCO3 20.3 (*)     POC BE -5 (*)     POC SATURATED O2 100      POC Lactate 1.69 (*)     POC TCO2 21 (*)     Rate 16      Sample ARTERIAL      Site RR      Allens Test Pass      DelSys Adult Vent      Mode AC/PRVC      Vt 400      PEEP 5      PiP 18      FiO2 100      Min Vol 12      Sp02 98     CULTURE, BLOOD   CULTURE, BLOOD   MAGNESIUM    Magnesium 1.6     SARS-COV-2 RNA AMPLIFICATION, QUAL    SARS-CoV-2 RNA, Amplification, Qual Negative     URINALYSIS MICROSCOPIC    RBC, UA 3      WBC, UA 3      Bacteria None      Squam Epithel, UA 0      Hyaline Casts, UA 0      Microscopic Comment SEE COMMENT      Narrative:     Specimen Source->Urine   URINALYSIS, REFLEX TO URINE CULTURE   TOPIRAMATE LEVEL   LACTIC ACID, PLASMA   URINALYSIS, REFLEX TO URINE CULTURE   CARBAMAZEPINE LEVEL, TOTAL          Imaging Results              CT Head Without  Contrast (Final result)  Result time 01/25/25 02:39:07      Final result by Rj Cardoso DO (01/25/25 02:39:07)                   Impression:      No acute intracranial abnormality.      Electronically signed by: Rj Cardoso  Date:    01/25/2025  Time:    02:39               Narrative:    EXAMINATION:  CT HEAD WITHOUT CONTRAST    CLINICAL HISTORY:  Seizure disorder, clinical change;    TECHNIQUE:  Low dose axial CT images obtained throughout the head without intravenous contrast. Sagittal and coronal reconstructions were performed.    COMPARISON:  CT head 12/23/2019.    FINDINGS:  Ventricles and sulci are normal in size for age without evidence of hydrocephalus. No extra-axial blood or fluid collections.  The brain parenchyma is normal. No parenchymal mass, hemorrhage, edema or major vascular distribution infarct.    No calvarial fracture.  The scalp is unremarkable.  There is mucosal thickening of the bilateral ethmoid sinuses.  The remaining paranasal sinuses and mastoid air cells are clear.                                       X-Ray Chest 1 View for Line/Tube Placement (Final result)  Result time 01/25/25 01:33:28      Final result by Rj Cardoso DO (01/25/25 01:33:28)                   Impression:      Endotracheal tube as above.      Electronically signed by: Rj Cardoso  Date:    01/25/2025  Time:    01:33               Narrative:    EXAMINATION:  XR CHEST 1 VIEW FOR LINE/TUBE PLACEMENT    CLINICAL HISTORY:  tube;    TECHNIQUE:  Single frontal portable view of the chest was performed.    COMPARISON:  12/26/2019.    FINDINGS:  There is an endotracheal tube with the tip approximately 5 cm proximal to the natalee.  There is a nasogastric tube with the tip and side-port in the stomach.  There is a stimulator device.  The lungs are well expanded and clear. No focal opacities are seen. The pleural spaces are clear. The cardiac silhouette is unremarkable. The visualized osseous structures are  unremarkable.                                       Medications   propofol (DIPRIVAN) 10 mg/mL infusion (42.5 mcg/kg/min × 79.4 kg Intravenous Rate/Dose Change 1/25/25 0310)   0.9% NaCl infusion (1,000 mLs Intravenous New Bag 1/25/25 0153)   LORazepam injection 2 mg (2 mg Intravenous Given by Other 1/25/25 0135)   etomidate injection 30 mg (30 mg Intravenous Given 1/25/25 0113)   acetaminophen 32 mg/mL liquid (PEDS) 649.6 mg (649.6 mg Per OG tube Given 1/25/25 0212)     Medical Decision Making  Given patient's continued seizure activity and lack of gag hypoxia patient intubated at arrival I spoke to Roxana SINGER at [facility] neuro critical care unit will add urine urine toxicology and a Topamax level patient received Ativan and propofol in the emergency department I have obtained a CT scan of the head results are pending at this time will will not delay transfer for these results will continue titrating propofol and Ativan for seizure control  After transfer request patient found to be febrile I have added sepsis workup and administered Tylenol    Amount and/or Complexity of Data Reviewed  Labs: ordered. Decision-making details documented in ED Course.  Radiology: ordered. Decision-making details documented in ED Course.    Risk  OTC drugs.  Prescription drug management.              Attending Attestation:         Attending Critical Care:   Critical Care Times:   Direct Patient Care (initial evaluation, reassessments, and time considering the case)................................................................12 minutes.   Additional History from reviewing old medical records or taking additional history from the family, EMS, PCP, etc.......................4 minutes.   Ordering, Reviewing, and Interpreting Diagnostic Studies...............................................................................................................8 minutes.    Documentation..................................................................................................................................................................................9 minutes.   Consultation with other Physicians. .................................................................................................................................................8 minutes.   ==============================================================  Total Critical Care Time - exclusive of procedural time: 41 minutes.  ==============================================================                                 Clinical Impression:  Final diagnoses:  [G40.901] Status epilepticus (Primary)  [Z13.6] Screening for cardiovascular condition          ED Disposition Condition    Transfer to Another Facility Fair                T[place], Modesto C., MD  01/25/25 0716

## 2025-01-25 NOTE — ED TRIAGE NOTES
Gonsalo Mei III is here with seizures since 0000 Hx of seizures, seizing since 0000 and given 10mg Versed IM by family at 0010

## 2025-01-25 NOTE — ED NOTES
[facility] arrived for pt. Transport.  Pt. Belongings given to mother.  Pt. Chart given to [facility] team.  Pt. IV's intact, propofol infusing.  Pt. Transferred to stretcher without difficulty, escorted out with [facility] team with ventilator

## 2025-01-25 NOTE — RESPIRATORY THERAPY
Latest Reference Range & Units 01/25/25 01:57   POC PH 7.35 - 7.45  7.372   POC PCO2 35 - 45 mmHg 35.0   POC PO2 80 - 100 mmHg 551 (H)   POC HCO3 24 - 28 mmol/L 20.3 (L)   POC SATURATED O2 95 - 100 % 100   Sample  ARTERIAL   POC TCO2 23 - 27 mmol/L 21 (L)   POC BE -2 to 2 mmol/L -5 (L)   FiO2  100   Vt  400   PiP  18   PEEP  5   DelSys  Adult Vent   Site  RR   Mode  AC/PRVC   POC Lactate 0.36 - 1.25 mmol/L 1.69 (H)   Rate  16   (H): Data is abnormally high  (L): Data is abnormally low

## 2025-01-26 LAB
OHS QRS DURATION: 74 MS
OHS QTC CALCULATION: 420 MS

## 2025-01-27 PROBLEM — Z99.11 ON MECHANICALLY ASSISTED VENTILATION: Status: RESOLVED | Noted: 2025-01-25 | Resolved: 2025-01-27

## 2025-01-27 LAB — TOPIRAMATE SERPL-MCNC: 4.7 MCG/ML

## 2025-01-28 PROBLEM — Z73.6 LIMITATION OF ACTIVITY DUE TO DISABILITY: Status: ACTIVE | Noted: 2025-01-28

## 2025-01-29 PROBLEM — G40.901 STATUS EPILEPTICUS: Status: RESOLVED | Noted: 2019-12-23 | Resolved: 2025-01-29

## 2025-01-30 LAB
BACTERIA BLD CULT: NORMAL
BACTERIA BLD CULT: NORMAL

## 2025-03-20 ENCOUNTER — OFFICE VISIT (OUTPATIENT)
Dept: URGENT CARE | Facility: CLINIC | Age: 30
End: 2025-03-20
Payer: MEDICAID

## 2025-03-20 VITALS
DIASTOLIC BLOOD PRESSURE: 75 MMHG | OXYGEN SATURATION: 97 % | TEMPERATURE: 99 F | HEIGHT: 64 IN | BODY MASS INDEX: 27.49 KG/M2 | RESPIRATION RATE: 17 BRPM | HEART RATE: 67 BPM | WEIGHT: 161 LBS | SYSTOLIC BLOOD PRESSURE: 130 MMHG

## 2025-03-20 DIAGNOSIS — H11.421 CHEMOSIS OF RIGHT CONJUNCTIVA: Primary | ICD-10-CM

## 2025-03-20 NOTE — PROGRESS NOTES
"Subjective:      Patient ID: Gonsalo Mei III is a 29 y.o. male.    Vitals:  height is 5' 4" (1.626 m) and weight is 73 kg (161 lb). His oral temperature is 98.5 °F (36.9 °C). His blood pressure is 130/75 and his pulse is 67. His respiration is 17 and oxygen saturation is 97%.     Chief Complaint: Eye Problem    29-year-old male with history developmental delay, seizures, presents with mother as primary historian with a chief complaint of right eye irritation, redness, and tearing that began yesterday.  No recent URI symptoms.  No indications of trauma.  History limited to patient's mental acuity level.    Eye Problem   The right eye is affected. This is a new problem. The current episode started yesterday. The problem has been unchanged. There was no injury mechanism. The patient is experiencing no pain. There is No known exposure to pink eye. He Does not wear contacts. Associated symptoms include an eye discharge, eye redness and itching. Pertinent negatives include no recent URI. He has tried nothing for the symptoms.       Unable to perform ROS: Other   Eyes:  Positive for eye discharge, eye itching, eye redness and eyelid swelling.      Objective:     Physical Exam   Constitutional: He is oriented to person, place, and time. He is cooperative.   HENT:   Head: Normocephalic and atraumatic.   Ears:   Right Ear: External ear normal.   Left Ear: External ear normal.   Nose: Nose normal.   Mouth/Throat: Uvula is midline, oropharynx is clear and moist and mucous membranes are normal. Mucous membranes are moist. Oropharynx is clear.   Eyes: Lids are normal. Lids are everted and swept, no foreign bodies found. Right eye exhibits chemosis. Right conjunctiva is injected. Extraocular movement intact      Comments: Eye exam limited.  He is guarding, and will not allow for complete eye exam.  Unable to apply proparacaine, or fluorescein.  Will not open eyes for detailed exam.   Neck: Trachea normal and phonation normal. " Neck supple.   Cardiovascular: Normal rate, regular rhythm, normal heart sounds and normal pulses.   Pulmonary/Chest: Effort normal and breath sounds normal.   Abdominal: Normal appearance.   Neurological: no focal deficit. He is alert, oriented to person, place, and time and at baseline. He has normal motor skills, normal sensation and intact cranial nerves (2-12). Gait and coordination normal. GCS eye subscore is 4. GCS verbal subscore is 5. GCS motor subscore is 6.   Skin: Skin is warm and dry. Capillary refill takes 2 to 3 seconds.   Psychiatric: He experiences Normal attention and Normal perception. His speech is normal and behavior is normal. Mood, judgment and thought content normal.   Nursing note and vitals reviewed.      Assessment:     1. Chemosis of right conjunctiva        Plan:       Chemosis of right conjunctiva          Medical Decision Making:   History:   I obtained history from: someone other than patient.  Initial Assessment:   Limited physical exam and history.  He will not participate in physical, and does not have the mental acuity to give detailed history.  Right eye is grossly injected, appears to have chemosis.  However this is limited, as he is guarding, and when attempting to examine eye he closes his eyes and refuses to open.  He will not follow simple commands.  Mother states that he is established with ophthalmologist, and I have recommended that he has evaluation with them for definitive treatment.  She is in agreeance with the plan.  Differential Diagnosis:   Conjunctivitis, foreign body, corneal abrasion, glaucoma  Urgent Care Management:  Attempted fluorescein stain exam.  Trauma risk versus benefits discussed.  Mother agrees that she will follow up with Ophthalmology

## 2025-03-22 ENCOUNTER — OFFICE VISIT (OUTPATIENT)
Dept: URGENT CARE | Facility: CLINIC | Age: 30
End: 2025-03-22
Payer: MEDICAID

## 2025-03-22 VITALS
OXYGEN SATURATION: 100 % | BODY MASS INDEX: 27.49 KG/M2 | WEIGHT: 161 LBS | RESPIRATION RATE: 18 BRPM | DIASTOLIC BLOOD PRESSURE: 57 MMHG | HEIGHT: 64 IN | HEART RATE: 72 BPM | SYSTOLIC BLOOD PRESSURE: 91 MMHG | TEMPERATURE: 99 F

## 2025-03-22 DIAGNOSIS — H10.89 OTHER CONJUNCTIVITIS OF RIGHT EYE: Primary | ICD-10-CM

## 2025-03-22 DIAGNOSIS — G40.909 NONINTRACTABLE EPILEPSY WITHOUT STATUS EPILEPTICUS, UNSPECIFIED EPILEPSY TYPE: ICD-10-CM

## 2025-03-22 PROCEDURE — 99214 OFFICE O/P EST MOD 30 MIN: CPT | Mod: S$GLB,,, | Performed by: NURSE PRACTITIONER

## 2025-03-22 RX ORDER — CETIRIZINE HYDROCHLORIDE 10 MG/1
10 TABLET ORAL DAILY
Qty: 30 TABLET | Refills: 0 | Status: SHIPPED | OUTPATIENT
Start: 2025-03-22 | End: 2026-03-22

## 2025-03-22 RX ORDER — FLUTICASONE PROPIONATE 50 MCG
2 SPRAY, SUSPENSION (ML) NASAL DAILY
Qty: 16 G | Refills: 1 | Status: SHIPPED | OUTPATIENT
Start: 2025-03-22 | End: 2025-03-22

## 2025-03-22 RX ORDER — CETIRIZINE HYDROCHLORIDE 10 MG/1
10 TABLET ORAL DAILY
Qty: 30 TABLET | Refills: 0 | Status: SHIPPED | OUTPATIENT
Start: 2025-03-22 | End: 2025-03-22

## 2025-03-22 RX ORDER — FLUTICASONE PROPIONATE 50 MCG
2 SPRAY, SUSPENSION (ML) NASAL DAILY
Qty: 16 G | Refills: 1 | Status: SHIPPED | OUTPATIENT
Start: 2025-03-22

## 2025-03-22 RX ORDER — TOBRAMYCIN AND DEXAMETHASONE 3; 1 MG/ML; MG/ML
1 SUSPENSION/ DROPS OPHTHALMIC EVERY 6 HOURS
Qty: 5 ML | Refills: 0 | Status: SHIPPED | OUTPATIENT
Start: 2025-03-22

## 2025-03-22 NOTE — PROGRESS NOTES
This dictation has been generated using Modal Fluency Dictation some phonetic errors may occur. Please contact author for clarification if needed.     1. Other conjunctivitis of right eye    2. Nonintractable epilepsy without status epilepticus, unspecified epilepsy type    Other orders  -     cetirizine (ZYRTEC) 10 MG tablet; Take 1 tablet (10 mg total) by mouth once daily.  Dispense: 30 tablet; Refill: 0  -     fluticasone propionate (FLONASE) 50 mcg/actuation nasal spray; 2 sprays (100 mcg total) by Each Nostril route once daily.  Dispense: 16 g; Refill: 1  -     tobramycin-dexAMETHasone (TOBRADEX ST) 0.3-0.05 % DrpS; Apply 1 drop to eye every 6 (six) hours. for 5 days  Dispense: 5 mL; Refill: 0       Assessment & Plan    IMPRESSION:  - Assessed eye irritation and redness, considering pink eye exposure.  - Evaluated symptoms suggestive of allergies (ear and nose itching).  - Determined treatment approach to address both potential pink eye and allergy symptoms.  - Considered seizure history, noting last episode in February during flu hospitalization.    ACUTE CONJUNCTIVITIS (RIGHT EYE):   Examined the patient's right eye and noted redness and swelling.   Assessed the condition as possibly related to pink eye exposure but presenting more like an allergy.   Prescribed eyedrops containing steroid and antibiotic components.   Recommend warm compresses and lid scrubs with baby shampoo.   Emphasized the importance of proper eye hygiene and lid scrubs.   Discussed the use of baby shampoo for eye cleaning due to its balanced pH.   Advised on the use of warm compresses for eye comfort.   Instructed the patient to perform lid scrubs using baby shampoo, avoiding direct eye contact.    ALLERGIC RHINITIS:   Assessed the symptoms as allergy-related.   Prescribed Zyrtec (cetirizine) for allergy symptoms.   Restarted Flonase (fluticasone) nasal spray.    EPILEPSY MANAGEMENT:   Noted the patient's history of seizures, with the  last seizure occurring in February during hospitalization for influenza.   Continued the patient's current seizure management regimen, including levetiracetam (200mg and 800mg), risperidone, and topiramate.    INFLUENZA HISTORY:   Noted the patient's history of influenza in February, which led to hospitalization and triggered a seizure.    FOLLOW-UP INSTRUCTIONS:   Cautioned the patient to follow up if symptoms worsen, including increased swelling, drainage, pain, or light sensitivity.   Advised to seek immediate optometrist consultation for slit lamp exam if condition deteriorates.           I will review all results and address accordingly.   No follow-ups on file.    ________________________________________________________________  ________________________________________________________________      Chief Complaint   Patient presents with    Eye Problem     History of present illness  History of Present Illness    CHIEF COMPLAINT:  Mr. Mei presents today for eye irritation after pink eye exposure.    EYE AND ALLERGY SYMPTOMS:  He presents with right eye redness, swelling, and clear drainage. He reports eye itching but denies pain, requiring sunshades yesterday due to symptoms. He also reports concurrent ear and nose itching. He denies fever, cough, or congestion.    MEDICAL HISTORY:  He has a history of seizures with the most recent episode occurring in February during hospitalization for influenza.    MEDICATIONS:  His current medications include Midless Transzene, licarbazine 200mg and 800mg, Respiradol, and Topamax. He previously used Zyrtec and Flonase for allergies but has run out.      ROS:  General: +fever  Eyes: +redness, +discharge, +eye swelling, +swelling around eyes, +eye itchiness  ENT: +nasal congestion, +itchy nose, +ear pruritus  Respiratory: +cough         Past Medical History:   Diagnosis Date    Cerebellar atrophy     by MRI    Epilepsy        Past Surgical History:   Procedure Laterality  Date    TONSILLECTOMY      vagal nerve stimulator (2004) Left 2004       Family History   Problem Relation Name Age of Onset    Asthma Mother      Diabetes Paternal Aunt      Heart disease Paternal Aunt      Hypertension Paternal Aunt      Diabetes Maternal Grandmother      Early death Maternal Grandmother      Heart disease Maternal Grandmother      Hypertension Maternal Grandmother      Stroke Maternal Grandmother      Cancer Maternal Grandmother      Heart disease Maternal Grandfather      Hypertension Maternal Grandfather         Social History     Socioeconomic History    Marital status: Single   Tobacco Use    Smoking status: Never   Substance and Sexual Activity    Alcohol use: Never    Drug use: Never    Sexual activity: Never     Comment: per mom     Social Drivers of Health     Financial Resource Strain: Low Risk  (1/25/2025)    Overall Financial Resource Strain (CARDIA)     Difficulty of Paying Living Expenses: Not very hard   Food Insecurity: No Food Insecurity (1/25/2025)    Hunger Vital Sign     Worried About Running Out of Food in the Last Year: Never true     Ran Out of Food in the Last Year: Never true   Transportation Needs: No Transportation Needs (2/10/2025)    OASIS : Transportation     Lack of Transportation (Medical): No     Lack of Transportation (Non-Medical): No     Patient Unable or Declines to Respond: No   Physical Activity: Inactive (1/8/2024)    Received from [facility]    Exercise Vital Sign     Days of Exercise per Week: 0 days     Minutes of Exercise per Session: 0 min   Stress: No Stress Concern Present (1/25/2025)    [employer] - Occupational Stress Questionnaire     Feeling of Stress : Not at all   Housing Stability: Unknown (1/25/2025)    Housing Stability Vital Sign     Unable to Pay for Housing in the Last Year: No     Homeless in the Last Year: No       Current Medications[1]    Review of patient's allergies indicates:   Allergen Reactions  "   Levetiracetam Hallucinations and Other (See Comments)    Tamiflu [oseltamivir] Other (See Comments)     seizures       Physical examination  Vitals Reviewed  BP (!) 91/57 (BP Location: Left arm, Patient Position: Sitting)   Pulse 72   Temp 98.5 °F (36.9 °C) (Oral)   Resp 18   Ht 5' 4" (1.626 m)   Wt 73 kg (161 lb)   SpO2 100%   BMI 27.64 kg/m²  Body mass index is 27.64 kg/m².     BP Readings from Last 3 Encounters:   03/22/25 (!) 91/57   03/20/25 130/75   02/08/25 138/78       Wt Readings from Last 3 Encounters:   03/22/25 73 kg (161 lb)   03/20/25 73 kg (161 lb)   01/30/25 73 kg (161 lb)       Physical Exam    Eyes: Redness in right eye. Swelling in right eye. Clear drainage from right eye.       No photophobia     This note was generated with the assistance of ambient listening technology. Verbal consent was obtained by the patient and accompanying visitor(s) for the recording of patient appointment to facilitate this note. I attest to having reviewed and edited the generated note for accuracy, though some syntax or spelling errors may persist. Please contact the author of this note for any clarification.      Call or return to clinic prn if these symptoms worsen or fail to improve as anticipated.                [1]   Current Outpatient Medications   Medication Sig Dispense Refill    clorazepate (TRANXENE) 15 MG tablet Take 0.5 tablets by mouth 2 (two) times daily.      clotrimazole (LOTRIMIN) 1 % cream APPLY TO THE AFFECTED AND SURROUNDING AREAS OF SKIN TOPICALY TWO TIMES PER DAY IN THE MORNING AND EVENING      eslicarbazepine (APTIOM) 200 mg Tab Take 2 tablets (400 mg total) by mouth once daily. 60 tablet 5    eslicarbazepine (APTIOM) 800 mg Tab Take 800 mg by mouth every evening.      hydrocortisone (PROCTO-MED HC) 2.5 % rectal cream Place rectally 2 (two) times daily. APPLY  CREAM TWICE DAILY AS NEEDED FOR  HEMORRHOIDS. 28 g 0    MIDAZOLAM NASL 1 spray by Nasal route As instructed (1 spray as needed " for seizure greater than 3 minutes. or more than 3 seizures in an hour. repeat dose in 5 mins. if seiz persists). 1 spray as needed for seizure greater than 3 minutes. or more than 3 seizures in an hour. repeat dose in 5 mins. if seiz persists  Indications: seizures      risperidone (RISPERDAL M-TABS) 1 MG TbDL Take 1 mg by mouth 2 (two) times daily.      topiramate (QUDEXY XR) 100 mg CSpX Take 200 mg by mouth every evening.      topiramate (QUDEXY XR) 100 mg CSpX Take 100 mg by mouth once daily. 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 30 tablet 0    fluticasone propionate (FLONASE) 50 mcg/actuation nasal spray 2 sprays (100 mcg total) by Each Nostril route once daily. 16 g 1    tobramycin-dexAMETHasone (TOBRADEX ST) 0.3-0.05 % DrpS Apply 1 drop to eye every 6 (six) hours. for 5 days 5 mL 0     No current facility-administered medications for this visit.

## 2025-03-22 NOTE — PROGRESS NOTES
Meds medically necessary due to uncontrolled allergies and possible bacterial infection. Allergy symptoms must be controlled to avoid rubbing the eye and causing damage.

## 2025-06-13 ENCOUNTER — OFFICE VISIT (OUTPATIENT)
Dept: URGENT CARE | Facility: CLINIC | Age: 30
End: 2025-06-13
Payer: MEDICAID

## 2025-06-13 VITALS
BODY MASS INDEX: 27.49 KG/M2 | RESPIRATION RATE: 16 BRPM | SYSTOLIC BLOOD PRESSURE: 137 MMHG | DIASTOLIC BLOOD PRESSURE: 83 MMHG | HEIGHT: 64 IN | HEART RATE: 70 BPM | WEIGHT: 161 LBS | TEMPERATURE: 98 F | OXYGEN SATURATION: 98 %

## 2025-06-13 DIAGNOSIS — H66.91 RIGHT OTITIS MEDIA, UNSPECIFIED OTITIS MEDIA TYPE: Primary | ICD-10-CM

## 2025-06-13 RX ORDER — AZITHROMYCIN 250 MG/1
TABLET, FILM COATED ORAL
Qty: 6 TABLET | Refills: 0 | Status: SHIPPED | OUTPATIENT
Start: 2025-06-13

## 2025-06-13 NOTE — PROGRESS NOTES
"Subjective:      Patient ID: Gonsalo Mei III is a 29 y.o. male.    Vitals:  height is 5' 4" (1.626 m) and weight is 73 kg (161 lb). His oral temperature is 98 °F (36.7 °C). His blood pressure is 137/83 and his pulse is 70. His respiration is 16 and oxygen saturation is 98%.     Chief Complaint: Otalgia    29-year-old afebrile male who presents today accompanied by his mother who reports that he has had a right earache since yesterday.    Otalgia   There is pain in the right ear. This is a new problem. The current episode started yesterday. The problem has been gradually worsening. He has tried acetaminophen for the symptoms. The treatment provided mild relief.       HENT:  Positive for ear pain.       Objective:     Physical Exam   Constitutional: He is oriented to person, place, and time.  Non-toxic appearance. He does not appear ill. No distress. obesity  HENT:   Head: Normocephalic and atraumatic.   Ears:   Right Ear: External ear and ear canal normal.   Left Ear: Tympanic membrane, external ear and ear canal normal.      Comments: Positive erythema/bulging of right tympanic membrane.  Nose: Nose normal.   Mouth/Throat: Mucous membranes are moist. Oropharynx is clear.   Eyes: Conjunctivae are normal. Extraocular movement intact   Neck: Neck supple.   Cardiovascular: Normal rate, regular rhythm, normal heart sounds and normal pulses.   Pulmonary/Chest: Effort normal and breath sounds normal.   Abdominal: Normal appearance.   Musculoskeletal: Normal range of motion.         General: Normal range of motion.   Neurological: He is alert and oriented to person, place, and time.   Skin: Skin is warm, dry and not diaphoretic.   Psychiatric: His behavior is normal.   Vitals reviewed.      Assessment:     1. Right otitis media, unspecified otitis media type        Plan:       Right otitis media, unspecified otitis media type  -     azithromycin (Z-PATRICIA) 250 MG tablet; Take 2 tablets by mouth on day 1; Take 1 tablet by " mouth on days 2-5  Dispense: 6 tablet; Refill: 0      INSTRUCTIONS  Medication as prescribed.  Rest.  Increase oral fluids.  Follow up with primary care as advised.  In the meantime, return here or go to ER for worsening of symptoms, or for any new symptoms as discussed.